# Patient Record
Sex: FEMALE | Race: WHITE | NOT HISPANIC OR LATINO | Employment: UNEMPLOYED | URBAN - NONMETROPOLITAN AREA
[De-identification: names, ages, dates, MRNs, and addresses within clinical notes are randomized per-mention and may not be internally consistent; named-entity substitution may affect disease eponyms.]

---

## 2017-07-11 ENCOUNTER — COMMUNICATION - GICH (OUTPATIENT)
Dept: FAMILY MEDICINE | Facility: OTHER | Age: 57
End: 2017-07-11

## 2017-07-11 DIAGNOSIS — R06.2 WHEEZING: ICD-10-CM

## 2017-08-19 ENCOUNTER — COMMUNICATION - GICH (OUTPATIENT)
Dept: FAMILY MEDICINE | Facility: OTHER | Age: 57
End: 2017-08-19

## 2017-08-19 DIAGNOSIS — I10 ESSENTIAL (PRIMARY) HYPERTENSION: ICD-10-CM

## 2017-09-08 ENCOUNTER — AMBULATORY - GICH (OUTPATIENT)
Dept: FAMILY MEDICINE | Facility: OTHER | Age: 57
End: 2017-09-08

## 2017-09-11 ENCOUNTER — HISTORY (OUTPATIENT)
Dept: FAMILY MEDICINE | Facility: OTHER | Age: 57
End: 2017-09-11

## 2017-09-11 ENCOUNTER — OFFICE VISIT - GICH (OUTPATIENT)
Dept: FAMILY MEDICINE | Facility: OTHER | Age: 57
End: 2017-09-11

## 2017-09-11 DIAGNOSIS — E89.40 ASYMPTOMATIC POSTPROCEDURAL OVARIAN FAILURE: ICD-10-CM

## 2017-09-11 DIAGNOSIS — Z72.0 TOBACCO USE: ICD-10-CM

## 2017-09-11 DIAGNOSIS — E55.9 VITAMIN D DEFICIENCY: ICD-10-CM

## 2017-09-11 DIAGNOSIS — R06.2 WHEEZING: ICD-10-CM

## 2017-09-11 DIAGNOSIS — I10 ESSENTIAL (PRIMARY) HYPERTENSION: ICD-10-CM

## 2017-09-11 DIAGNOSIS — F10.20 UNCOMPLICATED ALCOHOL DEPENDENCE (H): ICD-10-CM

## 2017-09-11 LAB
A/G RATIO - HISTORICAL: 1 (ref 1–2)
ALBUMIN SERPL-MCNC: 3.2 G/DL (ref 3.5–5.7)
ALP SERPL-CCNC: 127 IU/L (ref 34–104)
ALT (SGPT) - HISTORICAL: 52 IU/L (ref 7–52)
ANION GAP - HISTORICAL: 11 (ref 5–18)
AST SERPL-CCNC: 109 IU/L (ref 13–39)
BILIRUB SERPL-MCNC: 0.5 MG/DL (ref 0.3–1)
BUN SERPL-MCNC: 5 MG/DL (ref 7–25)
BUN/CREAT RATIO - HISTORICAL: 9
CALCIUM SERPL-MCNC: 8.8 MG/DL (ref 8.6–10.3)
CHLORIDE SERPLBLD-SCNC: 100 MMOL/L (ref 98–107)
CO2 SERPL-SCNC: 25 MMOL/L (ref 21–31)
CREAT SERPL-MCNC: 0.58 MG/DL (ref 0.7–1.3)
ERYTHROCYTE [DISTWIDTH] IN BLOOD BY AUTOMATED COUNT: 16.5 % (ref 11.5–15.5)
GFR IF NOT AFRICAN AMERICAN - HISTORICAL: >60 ML/MIN/1.73M2
GLOBULIN - HISTORICAL: 3.3 G/DL (ref 2–3.7)
GLUCOSE SERPL-MCNC: 56 MG/DL (ref 70–105)
HCT VFR BLD AUTO: 40.2 % (ref 33–51)
HEMOGLOBIN: 13.6 G/DL (ref 12–16)
INR - HISTORICAL: 0.9
MCH RBC QN AUTO: 30.1 PG (ref 26–34)
MCHC RBC AUTO-ENTMCNC: 33.8 G/DL (ref 32–36)
MCV RBC AUTO: 89 FL (ref 80–100)
PLATELET # BLD AUTO: 211 THOU/CU MM (ref 140–440)
PMV BLD: 10.6 FL (ref 6.5–11)
POTASSIUM SERPL-SCNC: 4 MMOL/L (ref 3.5–5.1)
PROT SERPL-MCNC: 6.5 G/DL (ref 6.4–8.9)
PROTIME - HISTORICAL: 11 SEC (ref 11.9–15.2)
RED BLOOD COUNT - HISTORICAL: 4.52 MIL/CU MM (ref 4–5.2)
SODIUM SERPL-SCNC: 136 MMOL/L (ref 133–143)
TSH - HISTORICAL: 1.98 UIU/ML (ref 0.34–5.6)
VITAMIN D TOTAL - HISTORICAL: 37.3 NG/ML
WHITE BLOOD COUNT - HISTORICAL: 4.3 THOU/CU MM (ref 4.5–11)

## 2017-09-11 ASSESSMENT — ANXIETY QUESTIONNAIRES
4. TROUBLE RELAXING: NOT AT ALL
2. NOT BEING ABLE TO STOP OR CONTROL WORRYING: MORE THAN HALF THE DAYS
GAD7 TOTAL SCORE: 8
5. BEING SO RESTLESS THAT IT IS HARD TO SIT STILL: NOT AT ALL
1. FEELING NERVOUS, ANXIOUS, OR ON EDGE: NEARLY EVERY DAY
6. BECOMING EASILY ANNOYED OR IRRITABLE: SEVERAL DAYS
7. FEELING AFRAID AS IF SOMETHING AWFUL MIGHT HAPPEN: SEVERAL DAYS
3. WORRYING TOO MUCH ABOUT DIFFERENT THINGS: SEVERAL DAYS

## 2017-09-12 ENCOUNTER — HOSPITAL ENCOUNTER (OUTPATIENT)
Dept: RADIOLOGY | Facility: OTHER | Age: 57
End: 2017-09-12
Attending: FAMILY MEDICINE

## 2017-09-12 DIAGNOSIS — Z72.0 TOBACCO USE: ICD-10-CM

## 2017-09-13 ENCOUNTER — COMMUNICATION - GICH (OUTPATIENT)
Dept: FAMILY MEDICINE | Facility: OTHER | Age: 57
End: 2017-09-13

## 2017-09-13 DIAGNOSIS — J47.0 BRONCHIECTASIS WITH ACUTE LOWER RESPIRATORY INFECTION (H): ICD-10-CM

## 2017-09-18 ENCOUNTER — AMBULATORY - GICH (OUTPATIENT)
Dept: SCHEDULING | Facility: OTHER | Age: 57
End: 2017-09-18

## 2017-09-18 ENCOUNTER — COMMUNICATION - GICH (OUTPATIENT)
Dept: FAMILY MEDICINE | Facility: OTHER | Age: 57
End: 2017-09-18

## 2017-09-19 ENCOUNTER — HOSPITAL ENCOUNTER (OUTPATIENT)
Dept: RADIOLOGY | Facility: OTHER | Age: 57
End: 2017-09-19
Attending: FAMILY MEDICINE

## 2017-09-19 DIAGNOSIS — E89.40 ASYMPTOMATIC POSTPROCEDURAL OVARIAN FAILURE: ICD-10-CM

## 2017-09-20 ENCOUNTER — HISTORY (OUTPATIENT)
Dept: FAMILY MEDICINE | Facility: OTHER | Age: 57
End: 2017-09-20

## 2017-09-20 ENCOUNTER — OFFICE VISIT - GICH (OUTPATIENT)
Dept: FAMILY MEDICINE | Facility: OTHER | Age: 57
End: 2017-09-20

## 2017-09-20 DIAGNOSIS — Z72.0 TOBACCO USE: ICD-10-CM

## 2017-09-20 DIAGNOSIS — J47.0 BRONCHIECTASIS WITH ACUTE LOWER RESPIRATORY INFECTION (H): ICD-10-CM

## 2017-09-20 DIAGNOSIS — M80.00XD AGE-RELATED OSTEOPOROSIS WITH CURRENT PATHOLOGICAL FRACTURE WITH ROUTINE HEALING: ICD-10-CM

## 2017-09-23 ENCOUNTER — HISTORY (OUTPATIENT)
Dept: FAMILY MEDICINE | Facility: OTHER | Age: 57
End: 2017-09-23

## 2017-09-25 ENCOUNTER — HOSPITAL ENCOUNTER (OUTPATIENT)
Dept: RESPIRATORY THERAPY | Facility: OTHER | Age: 57
End: 2017-09-25
Attending: FAMILY MEDICINE

## 2017-09-25 DIAGNOSIS — Z72.0 TOBACCO USE: ICD-10-CM

## 2017-09-25 DIAGNOSIS — R06.2 WHEEZING: ICD-10-CM

## 2017-10-04 ENCOUNTER — COMMUNICATION - GICH (OUTPATIENT)
Dept: FAMILY MEDICINE | Facility: OTHER | Age: 57
End: 2017-10-04

## 2017-10-05 ENCOUNTER — OFFICE VISIT - GICH (OUTPATIENT)
Dept: FAMILY MEDICINE | Facility: OTHER | Age: 57
End: 2017-10-05

## 2017-10-05 ENCOUNTER — HISTORY (OUTPATIENT)
Dept: FAMILY MEDICINE | Facility: OTHER | Age: 57
End: 2017-10-05

## 2017-10-05 DIAGNOSIS — R91.1 SOLITARY PULMONARY NODULE: ICD-10-CM

## 2017-10-05 DIAGNOSIS — J44.9 CHRONIC OBSTRUCTIVE PULMONARY DISEASE (H): ICD-10-CM

## 2017-10-05 DIAGNOSIS — F10.20 UNCOMPLICATED ALCOHOL DEPENDENCE (H): ICD-10-CM

## 2017-10-16 ENCOUNTER — COMMUNICATION - GICH (OUTPATIENT)
Dept: FAMILY MEDICINE | Facility: OTHER | Age: 57
End: 2017-10-16

## 2017-10-16 DIAGNOSIS — R93.89 ABNORMAL FINDINGS ON DIAGNOSTIC IMAGING OF OTHER SPECIFIED BODY STRUCTURES: ICD-10-CM

## 2017-10-18 ENCOUNTER — AMBULATORY - GICH (OUTPATIENT)
Dept: FAMILY MEDICINE | Facility: OTHER | Age: 57
End: 2017-10-18

## 2017-10-18 ENCOUNTER — HOSPITAL ENCOUNTER (OUTPATIENT)
Dept: RADIOLOGY | Facility: OTHER | Age: 57
End: 2017-10-18
Attending: FAMILY MEDICINE

## 2017-10-18 DIAGNOSIS — R93.89 ABNORMAL FINDINGS ON DIAGNOSTIC IMAGING OF OTHER SPECIFIED BODY STRUCTURES: ICD-10-CM

## 2017-10-18 DIAGNOSIS — J18.9 PNEUMONIA: ICD-10-CM

## 2017-10-19 ENCOUNTER — AMBULATORY - GICH (OUTPATIENT)
Dept: SCHEDULING | Facility: OTHER | Age: 57
End: 2017-10-19

## 2017-10-19 ENCOUNTER — COMMUNICATION - GICH (OUTPATIENT)
Dept: FAMILY MEDICINE | Facility: OTHER | Age: 57
End: 2017-10-19

## 2017-10-19 DIAGNOSIS — J44.9 CHRONIC OBSTRUCTIVE PULMONARY DISEASE (H): ICD-10-CM

## 2017-10-19 DIAGNOSIS — R91.1 SOLITARY PULMONARY NODULE: ICD-10-CM

## 2017-11-16 ENCOUNTER — AMBULATORY - GICH (OUTPATIENT)
Dept: SCHEDULING | Facility: OTHER | Age: 57
End: 2017-11-16

## 2017-11-27 ENCOUNTER — OFFICE VISIT - GICH (OUTPATIENT)
Dept: FAMILY MEDICINE | Facility: OTHER | Age: 57
End: 2017-11-27

## 2017-11-27 ENCOUNTER — HOSPITAL ENCOUNTER (OUTPATIENT)
Dept: RADIOLOGY | Facility: OTHER | Age: 57
End: 2017-11-27
Attending: FAMILY MEDICINE

## 2017-11-27 ENCOUNTER — HISTORY (OUTPATIENT)
Dept: FAMILY MEDICINE | Facility: OTHER | Age: 57
End: 2017-11-27

## 2017-11-27 DIAGNOSIS — M25.552 PAIN IN LEFT HIP: ICD-10-CM

## 2017-11-27 DIAGNOSIS — M25.562 PAIN IN LEFT KNEE: ICD-10-CM

## 2017-12-04 ENCOUNTER — AMBULATORY - GICH (OUTPATIENT)
Dept: FAMILY MEDICINE | Facility: OTHER | Age: 57
End: 2017-12-04

## 2017-12-04 DIAGNOSIS — A31.9 MYCOBACTERIAL INFECTION: ICD-10-CM

## 2017-12-11 ENCOUNTER — OFFICE VISIT - GICH (OUTPATIENT)
Dept: FAMILY MEDICINE | Facility: OTHER | Age: 57
End: 2017-12-11

## 2017-12-11 ENCOUNTER — HISTORY (OUTPATIENT)
Dept: FAMILY MEDICINE | Facility: OTHER | Age: 57
End: 2017-12-11

## 2017-12-11 DIAGNOSIS — M25.552 PAIN IN LEFT HIP: ICD-10-CM

## 2017-12-11 DIAGNOSIS — M25.562 PAIN IN LEFT KNEE: ICD-10-CM

## 2017-12-19 ENCOUNTER — AMBULATORY - GICH (OUTPATIENT)
Dept: SCHEDULING | Facility: OTHER | Age: 57
End: 2017-12-19

## 2017-12-19 ENCOUNTER — AMBULATORY - GICH (OUTPATIENT)
Dept: FAMILY MEDICINE | Facility: OTHER | Age: 57
End: 2017-12-19

## 2017-12-27 NOTE — PROGRESS NOTES
Patient Information     Patient Name MRN Lisy Lopez [id number] Female 1960      Progress Notes by Cas Sanches MD at 10/5/2017 11:00 AM     Author:  Cas Sanches MD Service:  (none) Author Type:  Physician     Filed:  10/5/2017  4:26 PM Encounter Date:  10/5/2017 Status:  Signed     :  Cas Sanches MD (Physician)            SUBJECTIVE:    Lisy Telles is a 57 y.o. female who presents for follow up COPD and ashtma    HPI    Leaves for EtOH treatment tomorrow.  Did detox last week.  Sober now for 1 week.  Did out patient before, but now is going to do inpatient.    Breathing is improving.  Is about 70% better overall from when she first was diagnosed with pneumonia.  Had been very tired, no longer.  She had PFTs done .  Showed moderate obstruction.  She coughs daily, spits up clear sputum, sticky.  No fevers.  No blood in sputum.  Has dyspnea on exertion, even with talking at times.  Is planning on stopping smoking in her treatment program.    I reviewed her last CT scan.  Is to have close follow up given nodule and pulmonary consolidation.    Allergies     Allergen  Reactions     Penicillins Anaphylaxis   ,   Current Outpatient Prescriptions on File Prior to Visit       Medication  Sig Dispense Refill     albuterol HFA (PROAIR HFA) 90 mcg/actuation inhaler Inhale 2 Puffs by mouth 4 times daily if needed. 1 Inhaler 2     alendronate (FOSAMAX) 70 mg tablet Take 1 tablet by mouth once a week in the morning. Take on empty stomach with full glass of water. Do not lie down for 1 hr. 13 tablet 4     hydrOXYzine HCl (ATARAX) 10 mg tablet Take 1-2 tablets by mouth 3 times daily if needed.       lisinopril (PRINIVIL; ZESTRIL) 5 mg tablet Take 1 tablet by mouth once daily. 90 tablet 3     naltrexone (REVIA) 50 mg tablet Take 1 tablet by mouth once daily.       traZODone (DESYREL) 50 mg tablet Take 1 tablet by mouth at bedtime.       venlafaxine (EFFEXOR XR) 150 mg  Extended-Release capsule Take 1 capsule by mouth once daily with evening meal. 90 capsule 3     venlafaxine (EFFEXOR XR) 75 mg cp24 Extended-Release capsule Take 1 capsule by mouth once daily with a meal. To take with 150 gd for 225 mg daily total 90 capsule 3     No current facility-administered medications on file prior to visit.    ,   Past Medical History:     Diagnosis  Date     Age-related osteoporosis with current pathological fracture with routine healing 2017     Alcohol dependence (HC)      Appendicitis      Axillary nerve palsy 2016     Closed 4-part fracture of proximal end of left humerus 2016     Depression     Depression with insomnia and tiredness      Fibroma     Right Breast      Fracture of ankle     Fracture, right ankle       Hx of pregnancy      4, Para 4-0-0-4       Hypercholesterolemia      Hyperplastic rectal polyp     Hyperplastic polyp times three noted on colonoscopy 2007      Hypertension      Tobacco abuse     and   Past Surgical History:      Procedure  Laterality Date     ANKLE FRACTURE TREATMENT  07    Open reduction/internal fixation, right ankle fracture 07.        BREAST LUMPECTOMY      Lumpectomy of the right breast        COLONOSCOPY      screening for family history,  follow-up recommended in five years.        COLONOSCOPY      Colonoscopy 2007.  Follow-up recommended in five years.        COLONOSCOPY  2013    Normal, hemorrhoids - follow up 5 years for family history of colon cnacer       GASTRIC BYPASS       HYSTERECTOMY  2007    Laparoscopic supracervical hysterectomy and bilateral salpingo-oophorectomy for bleeding fibroid       Skin Grafting      Skin grafting post right ankle fracture        TUBAL LIGATION         REVIEW OF SYSTEMS:  Review of Systems   Constitutional: Negative for chills and fever.   Respiratory: Positive for cough, sputum production and shortness of breath.    Cardiovascular:  "Negative for chest pain.   Neurological: Negative for headaches.   Psychiatric/Behavioral: Negative for substance abuse.       OBJECTIVE:  /72  Resp 16  Ht 1.626 m (5' 4\")  Wt 56.3 kg (124 lb 3.2 oz)  BMI 21.32 kg/m2    EXAM:   Physical Exam   Constitutional: She is oriented to person, place, and time and well-developed, well-nourished, and in no distress. No distress.   Cardiovascular: Normal rate, regular rhythm and normal heart sounds.  Exam reveals no gallop and no friction rub.    No murmur heard.  Pulmonary/Chest:   Decreased breath sounds diffusely, but clear to auscultation.   Neurological: She is alert and oriented to person, place, and time.   Skin: She is not diaphoretic.   Psychiatric: Memory, affect and judgment normal.        ASSESSMENT/PLAN:    ICD-10-CM    1. 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C) J44.9 tiotropium (SPIRIVA) 18 mcg inhalation capsule      fluticasone-salmeterol (ADVAIR DISKUS) 250-50 mcg/Dose diskus inhaler      nicotine 21 mg/24 hr (NICODERM; HABITROL) 21 mg/24 hr patch      nicotine 14 mg/24 hr (NICODERM; HABITROL) 14 mg/24 hr patch      nicotine 7 mg/24 hr (NICODERM; HABITROL) 7 mg/24 hr patch   2. Lung nodule < 6cm on CT R91.1    3. Alcoholism in recovery (HC) F10.20         Plan:  She appears to be improving from what is assumed to be pneumonia.  Reviewed the CT with her and I advise a repeat on it when she returns from her treatment.  Congratulated her on sobriety and filled nicoderm for her plan to stom smoking.  20/25 minutes spent in coordination of care and reviewing the results.    Cas Sanches MD ....................  10/5/2017   4:25 PM            "

## 2017-12-27 NOTE — PROGRESS NOTES
"Patient Information     Patient Name MRN Sex Lisy Cassidy [id number] Female 1960      Progress Notes by Silvino Peace MD at 2017 12:30 PM     Author:  Silvino Peace MD Service:  (none) Author Type:  Physician     Filed:  2017  7:26 AM Encounter Date:  2017 Status:  Signed     :  Silvino Peace MD (Physician)            SUBJECTIVE:      Lisy Telles is a 57 y.o. female who presents for follow up on hypertension, alcohol abuse, tobacco abuse.    She has a rough past year. Her son committed murder and was just sentenced to intermediate. Has resumed alcohol intake over the past year. She plans inpatient treatment for alcoholism.    BP is stable on lisinopril.    Working with psychiatry, Jack Hopkins, at [facility] on anxiety and alcoholism. Stopped Revia, but plans to resume. Has trazodone, but does not take when drinking alcohol.    She has a copy of x-ray report from chiropractor noting \"mild right lumbar convexity with right rotation, osteopetrosis, L5 spondylolisthesis grade I, severe L5-S1 discopathy, and compression fractures at L1, T11, and questionable at T8.\" - Dr Denver.  I looked at her previous abdominal MRI and no compression fractures noted. She fell off a horse since that time. Had ovaries removed around age 46. Never on estrogen.    Used albuterol PRN for wheezing. Continues to smoke 1 ppd.    PAST MEDICAL HISTORY:  Past Medical History:     Diagnosis  Date     Alcohol dependence (HC)      Appendicitis      Axillary nerve palsy 2016     Closed 4-part fracture of proximal end of left humerus 2016     Depression     Depression with insomnia and tiredness      Fibroma     Right Breast      Fracture of ankle     Fracture, right ankle       Hx of pregnancy      4, Para 4-0-0-4       Hypercholesterolemia      Hyperplastic rectal polyp     Hyperplastic polyp times three noted on colonoscopy 2007      Hypertension      Tobacco abuse  "     SURGICAL HISTORY:  Past Surgical History:      Procedure  Laterality Date     ANKLE FRACTURE TREATMENT  07    Open reduction/internal fixation, right ankle fracture 07.        BREAST LUMPECTOMY      Lumpectomy of the right breast        COLONOSCOPY      screening for family history,  follow-up recommended in five years.        COLONOSCOPY      Colonoscopy 2007.  Follow-up recommended in five years.        COLONOSCOPY  2013    Normal, hemorrhoids - follow up 5 years for family history of colon cnacer       GASTRIC BYPASS       HYSTERECTOMY  2007    Laparoscopic supracervical hysterectomy and bilateral salpingo-oophorectomy for bleeding fibroid       Skin Grafting      Skin grafting post right ankle fracture        TUBAL LIGATION         SOCIAL HISTORY:  Social History     Social History        Marital status:       Spouse name: N/A     Number of children:  N/A     Years of education:  N/A     Occupational History      Not on file.     Social History Main Topics         Smoking status:  Current Every Day Smoker      Packs/day: 1.00      Years: 43.00      Types: Cigarettes      Smokeless tobacco:  Never Used      Alcohol use  0.0 oz/week     0 Standard drinks or equivalent per week      Drug use:  No      Sexual activity:  Yes      Partners: Male      Birth control/ protection: Surgical      Other Topics  Concern     Not on file      Social History Narrative     . Drinks 6 beers daily.      FAMILY HISTORY:  Family History       Problem   Relation Age of Onset     Cancer-colon  Maternal Grandmother      Cancer-colon  Mother 40     surgical resection--remission       Hypertension  Mother      Heart Disease  Father 36     MI  - Quadruple bypass        Hypertension  Father      Thyroid Disease  Sister      Cancer-breast  Paternal Aunt 60            Other  Mother      Obesity       Cancer-colon  Maternal Grandmother      Other  Father      Obesity         CURRENT MEDICATIONS:   Current Outpatient Prescriptions       Medication  Sig Dispense Refill     albuterol HFA (PROAIR HFA) 90 mcg/actuation inhaler Inhale 2 Puffs by mouth 4 times daily if needed. 1 Inhaler 2     doxycycline (ADOXA) 100 mg tablet Take 1 tablet by mouth 2 times daily for 10 days. 20 tablet 0     hydrOXYzine HCl (ATARAX) 10 mg tablet Take 1-2 tablets by mouth 3 times daily if needed.       lisinopril (PRINIVIL; ZESTRIL) 5 mg tablet Take 1 tablet by mouth once daily. 90 tablet 3     naltrexone (REVIA) 50 mg tablet Take 1 tablet by mouth once daily.       traZODone (DESYREL) 50 mg tablet Take 1 tablet by mouth at bedtime.       venlafaxine (EFFEXOR XR) 150 mg Extended-Release capsule Take 1 capsule by mouth once daily with evening meal. 90 capsule 3     venlafaxine (EFFEXOR XR) 75 mg cp24 Extended-Release capsule Take 1 capsule by mouth once daily with a meal. To take with 150 gd for 225 mg daily total 90 capsule 3     No current facility-administered medications for this visit.      Medications have been reviewed by me and are current to the best of my knowledge and ability.    ALLERGIES:  Penicillins    REVIEW OF SYSTEMS:  Eyes: Denies problems  Ears/Nose/Throat: Denies problems  Cardiovascular: Denies problems  Respiratory: Denies problems  Gastrointestinal: Denies problems  Genitourinary: Denies problems  Musculoskeletal: back pain, seeing chiropractor  Skin: Denies problems  Neurologic: Denies problems  Psychiatric: alcoholism    PHQ Depression Screening 9/11/2017   Date of PHQ exam (doc flow) 9/11/2017   1. Lack of interest/pleasure 3 - Nearly every day   2. Feeling down/depressed 3 - Nearly every day   PHQ-2 TOTAL SCORE 6   3. Trouble sleeping 0 - Not at all   4. Decreased energy 3 - Nearly every day   5. Appetite change 3 - Nearly every day   6. Feelings of failure 3 - Nearly every day   7. Trouble concentrating 2 - More than half the days   8. Activity level 2 - More than half the days  "  9. Hurting yourself 1 - Several days   PHQ-9 TOTAL SCORE 20   PHQ-9 Severity Level severe   Functional Impairment very difficult   Some recent data might be hidden       OBJECTIVE:  /80  Pulse 84  Ht 1.622 m (5' 3.88\")  Wt 54.3 kg (119 lb 9.6 oz)  Breastfeeding? No  BMI 20.61 kg/m2  EXAM:   General Appearance: Pleasant, alert, appropriate appearance for age. No acute distress  Eye Exam:  Normal external eye, conjunctiva, lids, cornea. KIMBERLY.  Ear Exam: Normal TM's bilaterally.   OroPharynx Exam:  Dental hygiene adequate. Normal buccal mucose. Normal pharynx.  Neck Exam:  Supple, no masses or nodes.  Thyroid Exam: No nodules or enlargement.  Chest/Respiratory Exam: Normal chest wall and respirations. Clear to auscultation.  Cardiovascular Exam: Regular rate and rhythm. S1, S2, no murmur, click, gallop, or rubs.  Gastrointestinal Exam: Soft, non-tender, no masses or organomegaly.  Musculoskeletal Exam: Back is straight and non-tender, full ROM of upper and lower extremities.  Foot Exam: Left and right foot: good pedal pulses.  Skin: no rash or abnormalities  Neurologic Exam: Nonfocal, symmetric DTRs, normal gross motor, tone coordination and no tremor.  Psychiatric Exam: Alert and oriented - appropriate affect.    Results for orders placed or performed in visit on 09/11/17      COMP METABOLIC PANEL      Result  Value Ref Range    SODIUM 136 133 - 143 mmol/L    POTASSIUM 4.0 3.5 - 5.1 mmol/L    CHLORIDE 100 98 - 107 mmol/L    CO2,TOTAL 25 21 - 31 mmol/L    ANION GAP 11 5 - 18                    GLUCOSE 56 (L) 70 - 105 mg/dL    CALCIUM 8.8 8.6 - 10.3 mg/dL    BUN 5 (L) 7 - 25 mg/dL    CREATININE 0.58 (L) 0.70 - 1.30 mg/dL    BUN/CREAT RATIO           9                    GFR if African American >60 >60 ml/min/1.73m2    GFR if not African American >60 >60 ml/min/1.73m2    ALBUMIN 3.2 (L) 3.5 - 5.7 g/dL    PROTEIN,TOTAL 6.5 6.4 - 8.9 g/dL    GLOBULIN                  3.3 2.0 - 3.7 g/dL    A/G RATIO 1.0 1.0 - " 2.0                    BILIRUBIN,TOTAL 0.5 0.3 - 1.0 mg/dL    ALK PHOSPHATASE 127 (H) 34 - 104 IU/L    ALT (SGPT) 52 7 - 52 IU/L    AST (SGOT) 109 (H) 13 - 39 IU/L   CBC W PLT NO DIFF      Result  Value Ref Range    WHITE BLOOD COUNT         4.3 (L) 4.5 - 11.0 thou/cu mm    RED BLOOD COUNT           4.52 4.00 - 5.20 mil/cu mm    HEMOGLOBIN                13.6 12.0 - 16.0 g/dL    HEMATOCRIT                40.2 33.0 - 51.0 %    MCV                       89 80 - 100 fL    MCH                       30.1 26.0 - 34.0 pg    MCHC                      33.8 32.0 - 36.0 g/dL    RDW                       16.5 (H) 11.5 - 15.5 %    PLATELET COUNT            211 140 - 440 thou/cu mm    MPV                       10.6 6.5 - 11.0 fL   VITAMIN D 25 (DEFICIENCY)      Result  Value Ref Range    VITAMIN D TOTAL AFL 37.3   ng/mL   PROTIME-INR      Result  Value Ref Range    INR 0.9 <1.3    PROTIME 11.0 (L) 11.9 - 15.2 sec   TSH      Result  Value Ref Range    TSH 1.98 0.34 - 5.60 uIU/mL        ASSESSMENT/PLAN:    ICD-10-CM    1. Hypertension I10 lisinopril (PRINIVIL; ZESTRIL) 5 mg tablet   2. Postablative ovarian failure E89.40 XR DXA BONE DENSITY 2 SITES AXIAL   3. Alcoholism (HC) F10.20 COMP METABOLIC PANEL      CBC W PLT NO DIFF      VITAMIN D 25 (DEFICIENCY)      PROTIME-INR      TSH      COMP METABOLIC PANEL      CBC W PLT NO DIFF      VITAMIN D 25 (DEFICIENCY)      PROTIME-INR      TSH   4. Vitamin D insufficiency E55.9 VITAMIN D 25 (DEFICIENCY)      VITAMIN D 25 (DEFICIENCY)   5. Wheezing R06.2 albuterol HFA (PROAIR HFA) 90 mcg/actuation inhaler      COMPLETE PFT SPIROMETRY LUNG VOL DIFFUSION   6. Tobacco abuse Z72.0 COMPLETE PFT SPIROMETRY LUNG VOL DIFFUSION      CT CHEST WO      CANCELED: CT CHEST SCREENING LOW DOSE WO CONTRAST       BP WNL. Lab for monitoring WNL. Refilled lisinopril.     Given possible compression fractures with ovarian failure at age 46, obtained labs for possible early osteoporosis including TSH, vit D, and  calcium. DEXA scan ordered.     Ongoing tobacco use and wheezing. Recommend PFTs to assess for asthma or COPD. Refilled albuterol inhaler to use PRN. Has been using too frequently and so likely to need controller. She does plan to quit smoking.    With over 40 pack years of smoking, qualifies for a screening CT scan for lung cancer. Ordered.     Plans to quit smoking while in inpatient treatment, which I highly support.    F/U in a few months when complete with treatment    Greater than 50% of this 42 minute appointment spent on counseling.

## 2017-12-28 NOTE — TELEPHONE ENCOUNTER
Patient Information     Patient Name MRN Lisy Lopez [id number] Female 1960      Telephone Encounter by Erica Chavarria at 10/20/2017  9:22 AM     Author:  Erica Chavarria Service:  (none) Author Type:  (none)     Filed:  10/20/2017  9:23 AM Encounter Date:  10/19/2017 Status:  Signed     :  Erica Chavarria            Patient is aware .  Erica Chavarria LPN ....................10/20/2017  9:22 AM

## 2017-12-28 NOTE — PROGRESS NOTES
Patient Information     Patient Name MRN Lisy Lopez [id number] Female 1960      Progress Notes by Jose Guadalupe Guo MD at 2017  3:30 PM     Author:  Jose Guadalupe Guo MD Service:  (none) Author Type:  Physician     Filed:  2017  4:22 PM Encounter Date:  2017 Status:  Signed     :  Jose Guadalupe Guo MD (Physician)            SUBJECTIVE:  Lisy Telles is a 57 y.o. female here for follow-up. Patient injured herself while working at [employer] [facility] [employer] on . She reports that she was putting items in a cooler when she turned and tripped over a cart. She landed directly onto her left knee and rolled to her left side. She initially had left knee, left hip, left elbow and left shoulder pain. She is seen in emergency room where x-rays of her knee and hip were unremarkable. She states that her symptoms in her elbow, shoulder and knee had essentially resolved. She continues to have quite a bit of discomfort in her proximal and mid left anterior thigh. This is worsened with walking, standing. She's been using ibuprofen with minimal relief. No previous injuries to her left hip that she is aware of.    ROS:    As above otherwise ROS is unremarkable.    OBJECTIVE:  /64  Pulse 84  Wt 57.5 kg (126 lb 12.8 oz)  BMI 22.11 kg/m2    EXAM:  General Appearance: Pleasant, alert, appropriate appearance for age. No acute distress  Musculoskeletal: Left knee shows normal range of motion without any crepitus. She has no joint line tenderness to palpation. Normal varus, valgus, anterior drawer and Lachman's. Negative Xin's. No anterior swelling is noted.    Hip shows normal flexion, internal and external rotation however she has pain with both internal and external rotation. Her pain is in her proximal anterior thigh. She has no tenderness to palpation along her greater trochanter. She has mild tenderness to palpation over her anterior proximal and mid thigh. No  tenderness over her adductors. No SI joint tenderness.    ASSESSEMENT AND PLAN:    Lisy was seen today for [facility].    Diagnoses and all orders for this visit:    Left hip pain  -     XR HIP 2 OR 3 VIEWS W PELVIS LEFT; Future    Acute pain of left knee     outside records from [place] were reviewed with the patient's permission. This shows normal x-ray interpretations however I do not see the images. Based on her continuing left anterior thigh pain x-rays of her left hip and pelvis were repeated, personally reviewed and shows no significant bony abnormality. Suspect that this is likely soft tissue in nature. Would recommend continued anti-inflammatories, ice, stretching. If her prescription for hydrocodone was also given for pain relief. Potential side effects were discussed. We'll plan on having her follow up in 2 weeks. Workability was completed today.      Gonsalo Guo MD    This document was prepared using voice generated software.  While every attempt was made for accuracy, grammatical errors may exist.

## 2017-12-28 NOTE — TELEPHONE ENCOUNTER
Patient Information     Patient Name MRN Lisy Lopez [id number] Female 1960      Telephone Encounter by Shirin Phan at 2017  5:04 PM     Author:  Shirin Phan Service:  (none) Author Type:  (none)     Filed:  2017  5:06 PM Encounter Date:  2017 Status:  Signed     :  Shirin Phan            Patient states that she is not feeling any better and would like an appointment. Appointment arranged for Wednesday at 10:30.  Shirin Phan LPN 2017 5:04 PM

## 2017-12-28 NOTE — TELEPHONE ENCOUNTER
Patient Information     Patient Name MRN Lisy Lopez [id number] Female 1960      Telephone Encounter by Silvino Peace MD at 2017  4:13 PM     Author:  Silvino Peace MD Service:  (none) Author Type:  Physician     Filed:  2017  4:18 PM Encounter Date:  2017 Status:  Signed     :  Silvino Peace MD (Physician)            Please let her know the lung CT showed pneumonia because it looks like her airways do no clear phlegm from the lungs well enough. Treat with azithromycin and then we should actually repeat the CT scan in about a month to see if anything of concern remains. Nothing that looks bad right now, but the inflammation from pneumonia may cover up something of concern. I sent in doxycycline to take twice daily for 10 days

## 2017-12-28 NOTE — TELEPHONE ENCOUNTER
Patient Information     Patient Name MRN Lisy Lopez [id number] Female 1960      Telephone Encounter by Estefanía David at 10/5/2017  7:55 AM     Author:  Estefanía David Service:  (none) Author Type:  (none)     Filed:  10/5/2017  7:59 AM Encounter Date:  10/4/2017 Status:  Signed     :  Estefanía David            Notified pt of results below, pt states understanding and has no further questions at this time.    Estefanía Cortes LPN

## 2017-12-28 NOTE — TELEPHONE ENCOUNTER
Patient Information     Patient Name MRN Lisy Lopez [id number] Female 1960      Telephone Encounter by Estefanía David at 10/4/2017  4:29 PM     Author:  Estefanía David Service:  (none) Author Type:  (none)     Filed:  10/4/2017  4:34 PM Encounter Date:  10/4/2017 Status:  Signed     :  Estefanía David            Called pt in regards to message below. Pt states she is looking for results on a breathing test she had done. Clarified medication direction with in regards to Fosamax 70mg.  Estefanía Cortes LPN

## 2017-12-28 NOTE — TELEPHONE ENCOUNTER
Patient Information     Patient Name MRN Lisy Lopez [id number] Female 1960      Telephone Encounter by Balbina Phan RN at 2017  9:53 AM     Author:  Balbina Phan RN Service:  (none) Author Type:  NURS- Registered Nurse     Filed:  2017  9:57 AM Encounter Date:  2017 Status:  Signed     :  Balbina Phan RN (NURS- Registered Nurse)            Bronchodilator Inhalers     Office visit in the past 12 months.    Last visit with Silvino Peace MD  was on: 2016 in [facility]  Next visit with Silvino Peace MD  is on: No future appointment listed with this provider  Next visit with Family Practice is on: No future appointment listed in this department    Max refills 12 months from last office visit.  Prescription refilled per RN Medication Refill Policy.................... BALBINA PHAN RN ....................  2017   9:56 AM

## 2017-12-28 NOTE — PROGRESS NOTES
Patient Information     Patient Name MRLisy Woodard [id number] Female 1960      Progress Notes by Silvino Peace MD at 2017 10:30 AM     Author:  Silvino Peace MD Service:  (none) Author Type:  Physician     Filed:  2017 10:07 PM Encounter Date:  2017 Status:  Signed     :  Silvino Peace MD (Physician)            SUBJECTIVE:  57 y.o. female presents for follow up on abnormal CT scan showing pneumonia. Scan was for lung cancer screening. I did not know she was ill.  Reports a cough for 3 weeks. Worsened after I saw her on .   Started on doxycycline a week ago and is not better.  No fever, but feels hot and cold.  Some productive cough. Feels SOB for the past month.   Using albuterol 2-3 times daily.    Completed DEXA with T score of -3.3 and elevated FRAX scores. Never on any treatment previously.    Continues chronic alcohol use. Looking into treatment, but needs insurance to cover 1st    REVIEW OF SYSTEMS:    Constitutional: see above  Respiratory: see above  Cardiovascular: Negative  Gastrointestinal: Negative    Past Medical History:     Diagnosis  Date     Alcohol dependence (HC)      Appendicitis      Axillary nerve palsy 2016     Closed 4-part fracture of proximal end of left humerus 2016     Depression     Depression with insomnia and tiredness      Fibroma     Right Breast      Fracture of ankle     Fracture, right ankle       Hx of pregnancy      4, Para 4-0-0-4       Hypercholesterolemia      Hyperplastic rectal polyp     Hyperplastic polyp times three noted on colonoscopy 2007      Hypertension      Tobacco abuse           Current Outpatient Prescriptions       Medication  Sig Dispense Refill     albuterol HFA (PROAIR HFA) 90 mcg/actuation inhaler Inhale 2 Puffs by mouth 4 times daily if needed. 1 Inhaler 2     doxycycline (ADOXA) 100 mg tablet Take 1 tablet by mouth 2 times daily for 10 days. 20 tablet 0      hydrOXYzine HCl (ATARAX) 10 mg tablet Take 1-2 tablets by mouth 3 times daily if needed.       lisinopril (PRINIVIL; ZESTRIL) 5 mg tablet Take 1 tablet by mouth once daily. 90 tablet 3     naltrexone (REVIA) 50 mg tablet Take 1 tablet by mouth once daily.       traZODone (DESYREL) 50 mg tablet Take 1 tablet by mouth at bedtime.       venlafaxine (EFFEXOR XR) 150 mg Extended-Release capsule Take 1 capsule by mouth once daily with evening meal. 90 capsule 3     venlafaxine (EFFEXOR XR) 75 mg cp24 Extended-Release capsule Take 1 capsule by mouth once daily with a meal. To take with 150 gd for 225 mg daily total 90 capsule 3     Allergies as of 09/20/2017 - [place] as Reviewed 09/20/2017      Allergen  Reaction Noted     Penicillins Anaphylaxis 11/13/2012       OBJECTIVE:  /88  Pulse 88  Temp 98.4  F (36.9  C) (Tympanic)   Wt 55.8 kg (123 lb)  Breastfeeding? No  BMI 21.2 kg/m2    General Appearance: Pleasant, alert, appropriate appearance for age. No acute distress  Ear Exam: Normal TM's bilaterally. Normal auditory canals and external ears. Non-tender.  OroPharynx Exam: Dental hygiene adequate. Normal buccal mucosa. Normal pharynx.  Neck Exam: Supple, no masses or nodes.  Chest/Respiratory Exam: Normal chest wall and respirations. Clear to auscultation.  Cardiovascular Exam: Regular rate and rhythm. S1, S2, no murmur, click, gallop, or rubs.     ASSESSMENT/PLAN:    ICD-10-CM   1. Bronchiectasis with acute lower respiratory infection (HC) J47.0   2. Tobacco abuse Z72.0   3. Age-related osteoporosis with current pathological fracture with routine healing M80.00XD     Based on trajectory, she is improving now on doxycycline. If worse, then may need cefdinir or Levaquin. Based on tobacco use, suspect that she may benefit from prednisone for COPD. Given prednisone at low dose, 20 mg daily x 5 days to see if this helps.  Take ranitidine while on prednisone to protect stomach since she is still drinking  alcohol    With osteoporosis, start Fosamax when antibiotics and prednisone are complete. If not tolerating, then may have to wait for sobriety and retry.  F/U in 5 weeks

## 2017-12-28 NOTE — TELEPHONE ENCOUNTER
Patient Information     Patient Name MRLisy Woodard [id number] Female 1960      Telephone Encounter by Silvino Peace MD at 10/4/2017  8:04 PM     Author:  Silvino Peace MD Service:  (none) Author Type:  Physician     Filed:  10/4/2017  8:05 PM Encounter Date:  10/4/2017 Status:  Signed     :  Silvino Peace MD (Physician)            Testing shows moderate COPD and asthma. I cannot send results to [facility]. She may benefit from asthma and COPD inhalers, but depends on symptoms and if she improved with the antibiotics and prednisone. Can wait to discuss if she is going into treatment.

## 2017-12-28 NOTE — PROGRESS NOTES
Patient Information     Patient Name MRN Lisy Lopez [id number] Female 1960      Progress Notes by Shirin Bear at 2017  5:10 PM     Author:  Shriin Bear Service:  (none) Author Type:  Other Clinical Staff     Filed:  2017  5:10 PM Date of Service:  2017  5:10 PM Status:  Signed     :  Shirin Bear (Other Clinical Staff)            Falls Risk Criteria:    Age 65 and older or under age 4        Sensory deficits    Poor vision    Use of ambulatory aides    Impaired judgment    Unable to walk independently    Meets High Risk criteria for falls:  no

## 2017-12-28 NOTE — TELEPHONE ENCOUNTER
Patient Information     Patient Name MRLisy Woodard [id number] Female 1960      Telephone Encounter by Kenny Chamorro RN at 2017 11:20 AM     Author:  Kenny Chamorro RN Service:  (none) Author Type:  NURS- Registered Nurse     Filed:  2017 11:27 AM Encounter Date:  2017 Status:  Signed     :  Kenny Chamorro RN (NURS- Registered Nurse)            Redundant Refill Request for Lisinopril refused;    Prescribing Provider: Silvino Peace MD                Order Date: 2016  Ordered by: SILVINO PEACE  Medication:lisinopril (PRINIVIL; ZESTRIL) 5 mg tablet    Qty:90 tablet   Ref:3  Start:2016   End:              Route:Oral                  SARA:No   Class:eRx    Sig:Take 1 tablet by mouth once daily.    Pharmacy:[facility] [address] [place] [address] AT [address]              OF [facility] [address] [facility] - [phone]    Rx as requested is not due for refill for almost a month as noted above. Last office visit with PCP to discuss diagnosis of hypertension, as well as use of lisinopril was on 16. Office visit notes on that date indicate that patient was due for a physical at that time, as well as would be due for a follow up in relation to her Hypertension in on years time. Patient is coming due for office visit in 2017. No office visit scheduled. Will send patient w reminder letter/MyChart message.    Unable to complete prescription refill per RN Medication Refill Policy.................... Kenny Chamorro RN ....................  2017   11:25 AM

## 2017-12-28 NOTE — TELEPHONE ENCOUNTER
Patient Information     Patient Name MRN Lisy Lopez [id number] Female 1960      Telephone Encounter by Silvino Peace MD at 10/16/2017 11:46 AM     Author:  Silvino Peace MD Service:  (none) Author Type:  Physician     Filed:  10/16/2017 11:46 AM Encounter Date:  10/16/2017 Status:  Signed     :  Silvino Peace MD (Physician)            Ordered. Should follow up after scan to discuss results and follow up on breathing test

## 2017-12-28 NOTE — TELEPHONE ENCOUNTER
Patient Information     Patient Name MRN Sex Lisy Cassidy [id number] Female 1960      Telephone Encounter by Tony Varma LPN at 10/16/2017 11:35 AM     Author:  Tony Varma LPN Service:  (none) Author Type:  NURS- Licensed Practical Nurse     Filed:  10/16/2017 11:36 AM Encounter Date:  10/16/2017 Status:  Signed     :  Tony Varma LPN (NURS- Licensed Practical Nurse)            Patient would like CT referral placed for [facility]. Please place.  Tony Varma LPN ..............10/16/2017 11:35 AM

## 2017-12-28 NOTE — TELEPHONE ENCOUNTER
Patient Information     Patient Name MRN Lisy Lopez [id number] Female 1960      Telephone Encounter by Erica Chavarria at 10/19/2017  4:50 PM     Author:  Erica Chavarria Service:  (none) Author Type:  (none)     Filed:  10/19/2017  4:51 PM Encounter Date:  10/19/2017 Status:  Signed     :  Erica Chavarria            Patient needs referral to see pulmonary  specialist in [place] as is unable to get into [place] .

## 2017-12-28 NOTE — TELEPHONE ENCOUNTER
Patient Information     Patient Name MRLisy Woodard [id number] Female 1960      Telephone Encounter by Eliza Thomas at 10/16/2017 11:27 AM     Author:  Eliza Thomas Service:  (none) Author Type:  (none)     Filed:  10/16/2017 11:28 AM Encounter Date:  10/16/2017 Status:  Signed     :  Eliza Thomas            Left message to call back  ..................Eliza Thomas LPN......10/16/2017 11:28 AM

## 2017-12-28 NOTE — PATIENT INSTRUCTIONS
Patient Information     Patient Name MRN Lisy Lopez [id number] Female 1960      Patient Instructions by Silvino Peace MD at 2017 12:30 PM     Author:  Silvino Peace MD  Service:  (none) Author Type:  Physician     Filed:  2017  1:21 PM  Encounter Date:  2017 Status:  Addendum     :  Silvino Peace MD (Physician)        Related Notes: Original Note by Silvino Peace MD (Physician) filed at 2017  1:19 PM            Ordered bone density test  Ordered a chest CT as a lung cancer screen  Ordered a breathing test  Follow up in a few months

## 2017-12-28 NOTE — TELEPHONE ENCOUNTER
Patient Information     Patient Name MRN Lisy Lopez [id number] Female 1960      Telephone Encounter by Shriin Phan at 2017  4:47 PM     Author:  Shirin Phan Service:  (none) Author Type:  (none)     Filed:  2017  4:48 PM Encounter Date:  2017 Status:  Signed     :  Shirin Phan            Patient notified after name and date of birth were verified.  Shirin Phan LPN 2017 4:47 PM

## 2017-12-29 NOTE — PATIENT INSTRUCTIONS
Patient Information     Patient Name MRN Lisy Lopez [id number] Female 1960      Patient Instructions by Silvino Peace MD at 2017 10:30 AM     Author:  Silvino Peace MD Service:  (none) Author Type:  Physician     Filed:  2017 11:07 AM Encounter Date:  2017 Status:  Signed     :  Silvino Peace MD (Physician)            Continue doxycycline   Prednisone 20 mg daily for 5 days  Take ranitidine 150 mg twice daily while on the prednisone to protect the stomach    Start Fosamax when better and done with antibiotics and prednisone. Likely start in a couple weeks    Follow up end of October

## 2017-12-30 NOTE — NURSING NOTE
Patient Information     Patient Name Lisy Mijares [id number] Female 1960      Nursing Note by Kaylee Penny at 2017  3:30 PM     Author:  Kaylee Penny Service:  (none) Author Type:  (none)     Filed:  2017  3:47 PM Encounter Date:  2017 Status:  Signed     :  Kaylee Penny            Patient presents today for an ED follow up and work comp visit. Patient states she was putting milk away in the cooler, went to turn and fell onto the cement. She was seen in [place] on 17 where she states she had xrays and was told she had no fractures. She states her left knee is feeling better, but is having pain in her upper left thigh.  Kaylee Penny LPN .............2017  3:41 PM

## 2017-12-30 NOTE — NURSING NOTE
Patient Information     Patient Name Lisy Mijares [id number] Female 1960      Nursing Note by Shirin Phan at 2017 10:30 AM     Author:  Shirin Phan Service:  (none) Author Type:  (none)     Filed:  2017 10:52 AM Encounter Date:  2017 Status:  Signed     :  Shirin Phanduane Telles is a 57 y.o. female presenting for follow up. She states that she is still not feeling well. She doesn't know if it is the pneumonia or her depression. Patient aware that she is due for a pap and mammogram.  Shirin Phan LPN 2017 10:42 AM

## 2017-12-30 NOTE — NURSING NOTE
Patient Information     Patient Name MRN Lisy Lopez [id number] Female 1960      Nursing Note by Simi Simon at 10/5/2017 11:00 AM     Author:  Simi Simon Service:  (none) Author Type:  (none)     Filed:  10/5/2017 11:31 AM Encounter Date:  10/5/2017 Status:  Signed     :  Simi Simon            Pt had pneumonia, given antibiotics done with them, had a breathing test her COPD and asthma, is needing medicaiton  Simi Simon ....................  10/5/2017   11:11 AM

## 2017-12-30 NOTE — NURSING NOTE
Patient Information     Patient Name MRN Lisy Lopez [id number] Female 1960      Nursing Note by Shirin Phan at 2017 12:30 PM     Author:  Shirin Phan Service:  (none) Author Type:  (none)     Filed:  2017 12:51 PM Encounter Date:  2017 Status:  Signed     :  Shirin Phanduane Telles is a 57 y.o. female  presenting today for yearly medication management.   Shirin Phan LPN 2017 12:38 PM

## 2018-01-02 ENCOUNTER — AMBULATORY - GICH (OUTPATIENT)
Dept: SCHEDULING | Facility: OTHER | Age: 58
End: 2018-01-02

## 2018-01-03 ENCOUNTER — COMMUNICATION - GICH (OUTPATIENT)
Dept: SURGERY | Facility: OTHER | Age: 58
End: 2018-01-03

## 2018-01-24 ENCOUNTER — DOCUMENTATION ONLY (OUTPATIENT)
Dept: FAMILY MEDICINE | Facility: OTHER | Age: 58
End: 2018-01-24

## 2018-01-24 PROBLEM — F41.9 ANXIETY: Status: ACTIVE | Noted: 2018-01-24

## 2018-01-24 PROBLEM — Z72.0 TOBACCO ABUSE: Status: ACTIVE | Noted: 2017-09-11

## 2018-01-24 PROBLEM — F10.21 ALCOHOLISM IN RECOVERY (H): Status: ACTIVE | Noted: 2017-10-05

## 2018-01-24 PROBLEM — J43.9 COPD WITH CHRONIC BRONCHITIS AND EMPHYSEMA (H): Status: ACTIVE | Noted: 2017-10-05

## 2018-01-24 PROBLEM — J44.89 COPD WITH CHRONIC BRONCHITIS AND EMPHYSEMA (H): Status: ACTIVE | Noted: 2017-10-05

## 2018-01-24 PROBLEM — J47.0 BRONCHIECTASIS WITH ACUTE LOWER RESPIRATORY INFECTION (H): Status: ACTIVE | Noted: 2017-09-13

## 2018-01-24 PROBLEM — J44.9 COPD (CHRONIC OBSTRUCTIVE PULMONARY DISEASE) (H): Status: ACTIVE | Noted: 2018-01-24

## 2018-01-24 PROBLEM — M80.00XD AGE-RELATED OSTEOPOROSIS WITH CURRENT PATHOLOGICAL FRACTURE WITH ROUTINE HEALING: Status: ACTIVE | Noted: 2017-09-20

## 2018-01-24 RX ORDER — TRAZODONE HYDROCHLORIDE 50 MG/1
1-3 TABLET, FILM COATED ORAL AT BEDTIME
COMMUNITY
Start: 2017-11-13 | End: 2019-01-16

## 2018-01-24 RX ORDER — ALENDRONATE SODIUM 70 MG/1
TABLET ORAL
COMMUNITY
Start: 2017-09-23 | End: 2020-01-16

## 2018-01-24 RX ORDER — SODIUM CHLORIDE FOR INHALATION 3 %
VIAL, NEBULIZER (ML) INHALATION
COMMUNITY
Start: 2017-11-21 | End: 2022-01-25

## 2018-01-24 RX ORDER — HYDROCODONE BITARTRATE AND ACETAMINOPHEN 5; 325 MG/1; MG/1
1 TABLET ORAL EVERY 6 HOURS PRN
COMMUNITY
Start: 2017-11-27 | End: 2018-11-20

## 2018-01-24 RX ORDER — FLUTICASONE PROPIONATE 50 MCG
1-2 SPRAY, SUSPENSION (ML) NASAL DAILY
COMMUNITY
Start: 2017-10-09 | End: 2020-01-16

## 2018-01-24 RX ORDER — HYDROXYZINE HYDROCHLORIDE 10 MG/1
1-2 TABLET, FILM COATED ORAL 3 TIMES DAILY PRN
COMMUNITY
Start: 2017-08-30 | End: 2020-01-30

## 2018-01-24 RX ORDER — VENLAFAXINE HYDROCHLORIDE 150 MG/1
300 CAPSULE, EXTENDED RELEASE ORAL
COMMUNITY
Start: 2017-10-15 | End: 2019-01-16

## 2018-01-24 RX ORDER — LISINOPRIL 5 MG/1
5 TABLET ORAL DAILY
COMMUNITY
Start: 2017-09-11 | End: 2018-09-28

## 2018-01-24 RX ORDER — ALBUTEROL SULFATE 90 UG/1
2 AEROSOL, METERED RESPIRATORY (INHALATION) 4 TIMES DAILY PRN
COMMUNITY
Start: 2017-09-11 | End: 2018-09-13

## 2018-01-24 RX ORDER — ALBUTEROL SULFATE 0.83 MG/ML
1 SOLUTION RESPIRATORY (INHALATION) 4 TIMES DAILY
COMMUNITY
Start: 2017-11-21 | End: 2018-11-20

## 2018-01-27 VITALS
BODY MASS INDEX: 20.47 KG/M2 | TEMPERATURE: 98.4 F | DIASTOLIC BLOOD PRESSURE: 88 MMHG | HEART RATE: 88 BPM | SYSTOLIC BLOOD PRESSURE: 136 MMHG | WEIGHT: 123 LBS

## 2018-01-27 VITALS
HEART RATE: 84 BPM | BODY MASS INDEX: 20.42 KG/M2 | HEART RATE: 84 BPM | DIASTOLIC BLOOD PRESSURE: 72 MMHG | WEIGHT: 124.2 LBS | SYSTOLIC BLOOD PRESSURE: 112 MMHG | WEIGHT: 119.6 LBS | WEIGHT: 126.8 LBS | DIASTOLIC BLOOD PRESSURE: 80 MMHG | BODY MASS INDEX: 21.21 KG/M2 | RESPIRATION RATE: 16 BRPM | BODY MASS INDEX: 21.1 KG/M2 | DIASTOLIC BLOOD PRESSURE: 64 MMHG | SYSTOLIC BLOOD PRESSURE: 118 MMHG | HEIGHT: 64 IN | SYSTOLIC BLOOD PRESSURE: 116 MMHG | HEIGHT: 64 IN

## 2018-01-29 ENCOUNTER — AMBULATORY - GICH (OUTPATIENT)
Dept: LAB | Facility: OTHER | Age: 58
End: 2018-01-29

## 2018-01-29 ENCOUNTER — MEDICAL CORRESPONDENCE (OUTPATIENT)
Dept: HEALTH INFORMATION MANAGEMENT | Facility: OTHER | Age: 58
End: 2018-01-29

## 2018-01-29 DIAGNOSIS — A31.0 PULMONARY MYCOBACTERIAL INFECTION (H): ICD-10-CM

## 2018-01-29 DIAGNOSIS — J47.9 BRONCHIECTASIS WITHOUT COMPLICATION (H): ICD-10-CM

## 2018-01-29 DIAGNOSIS — J44.9 CHRONIC OBSTRUCTIVE PULMONARY DISEASE (H): ICD-10-CM

## 2018-01-29 LAB
ABSOLUTE BASOPHILS - HISTORICAL: 0.1 THOU/CU MM
ABSOLUTE EOSINOPHILS - HISTORICAL: 0.4 THOU/CU MM
ABSOLUTE IMMATURE GRANULOCYTES(METAS,MYELOS,PROS) - HISTORICAL: 0 THOU/CU MM
ABSOLUTE LYMPHOCYTES - HISTORICAL: 1.4 THOU/CU MM (ref 0.9–2.9)
ABSOLUTE MONOCYTES - HISTORICAL: 0.5 THOU/CU MM
ABSOLUTE NEUTROPHILS - HISTORICAL: 2.6 THOU/CU MM (ref 1.7–7)
ALT (SGPT) - HISTORICAL: 11 IU/L (ref 7–52)
AST SERPL-CCNC: 20 IU/L (ref 13–39)
BASOPHILS # BLD AUTO: 1 %
CREAT SERPL-MCNC: 0.76 MG/DL (ref 0.7–1.3)
EOSINOPHIL NFR BLD AUTO: 8.4 %
ERYTHROCYTE [DISTWIDTH] IN BLOOD BY AUTOMATED COUNT: 13.9 % (ref 11.5–15.5)
GFR IF NOT AFRICAN AMERICAN - HISTORICAL: >60 ML/MIN/1.73M2
HCT VFR BLD AUTO: 34.1 % (ref 33–51)
HEMOGLOBIN: 11.1 G/DL (ref 12–16)
IMMATURE GRANULOCYTES(METAS,MYELOS,PROS) - HISTORICAL: 0.2 %
LYMPHOCYTES NFR BLD AUTO: 27.8 % (ref 20–44)
MCH RBC QN AUTO: 29.3 PG (ref 26–34)
MCHC RBC AUTO-ENTMCNC: 32.6 G/DL (ref 32–36)
MCV RBC AUTO: 90 FL (ref 80–100)
MONOCYTES NFR BLD AUTO: 9.5 %
NEUTROPHILS NFR BLD AUTO: 53.1 % (ref 42–72)
PLATELET # BLD AUTO: 266 THOU/CU MM (ref 140–440)
PMV BLD: 9.8 FL (ref 6.5–11)
RED BLOOD COUNT - HISTORICAL: 3.79 MIL/CU MM (ref 4–5.2)
WHITE BLOOD COUNT - HISTORICAL: 4.9 THOU/CU MM (ref 4.5–11)

## 2018-01-30 ENCOUNTER — AMBULATORY - GICH (OUTPATIENT)
Dept: FAMILY MEDICINE | Facility: OTHER | Age: 58
End: 2018-01-30

## 2018-01-30 DIAGNOSIS — A31.0 PULMONARY MYCOBACTERIAL INFECTION (H): ICD-10-CM

## 2018-02-04 ASSESSMENT — PATIENT HEALTH QUESTIONNAIRE - PHQ9: SUM OF ALL RESPONSES TO PHQ QUESTIONS 1-9: 20

## 2018-02-04 ASSESSMENT — ANXIETY QUESTIONNAIRES: GAD7 TOTAL SCORE: 8

## 2018-02-08 ENCOUNTER — AMBULATORY - GICH (OUTPATIENT)
Dept: SCHEDULING | Facility: OTHER | Age: 58
End: 2018-02-08

## 2018-02-09 VITALS
HEART RATE: 78 BPM | SYSTOLIC BLOOD PRESSURE: 110 MMHG | WEIGHT: 125 LBS | BODY MASS INDEX: 21.46 KG/M2 | DIASTOLIC BLOOD PRESSURE: 70 MMHG

## 2018-02-12 NOTE — TELEPHONE ENCOUNTER
Patient Information     Patient Name MRN Lisy Lopez [id number] Female 1960      Telephone Encounter by Shirin Phan at 2017 11:01 AM     Author:  Shirin Phan Service:  (none) Author Type:  (none)     Filed:  2017 11:05 AM Encounter Date:  2017 Status:  Signed     :  Shirin Phan            EKG was sent down to the [facility] per ordering provider Betty Tavares.   Shirin Phan LPN 2017 11:04 AM

## 2018-02-12 NOTE — NURSING NOTE
"Patient Information     Patient Name Lisy Mijares [id number] Female 1960      Nursing Note by Kaylee Penny at 2017  1:45 PM     Author:  Kaylee Penny Service:  (none) Author Type:  (none)     Filed:  2017  2:00 PM Encounter Date:  2017 Status:  Signed     :  Kaylee Penny            Patient presents today to follow up on her left knee injury. She states that she has since improved and is \"ready to get back to it\".  Kaylee Penny LPN .............2017  1:46 PM          "

## 2018-02-12 NOTE — NURSING NOTE
Patient Information     Patient Name MRN Lisy Lopez [id number] Female 1960      Nursing Note by Mariluz Stratton RN at 2017  3:00 PM     Author:  Mariluz Stratton RN Service:  (none) Author Type:  NURS- Registered Nurse     Filed:  2017  3:53 PM Encounter Date:  2017 Status:  Signed     :  Mariluz Stratton RN (NURS- Registered Nurse)            Pt arrived for EKG late today due to weather. Pt denies chest pains she does have SOB due to lung infection she is being treated for  . Papers to [facility] to be faxed to [facility] per ordering provider Betty Schuler at fax number [phone]. MARILUZ STRATTON RN ....................  2017   3:51 PM

## 2018-02-12 NOTE — PROGRESS NOTES
Patient Information     Patient Name MRN Lisy Lopez [id number] Female 1960      Progress Notes by Jose Guadalupe Guo MD at 2017  1:45 PM     Author:  Jose Guadalupe Guo MD Service:  (none) Author Type:  Physician     Filed:  2017  2:07 PM Encounter Date:  2017 Status:  Signed     :  Jose Guadalupe Guo MD (Physician)            SUBJECTIVE:  Lisy Telles is a 57 y.o. female here for follow-up. Patient injured herself while working at [employer] [employer] on . She reports that she was putting items in a cooler when she turned and tripped over a cart. She landed directly onto her left knee and rolled to her left side. She initially had left knee, left hip, left elbow and left shoulder pain. She was seen in the emergency room where x-rays of her knee and hip were unremarkable.     At her last visit she is having increased pain in her left hip where x-rays were unremarkable. She was placed on work restrictions and was unable to work based on her restrictions. She comes in today stating that her pain has essentially resolved. The only discomfort she has it on her left knee when she tries to kneel on it and otherwise she feels back to her baseline.    ROS:    As above otherwise ROS is unremarkable.    OBJECTIVE:  /70  Pulse 78  Wt 56.7 kg (125 lb)  BMI 21.8 kg/m2    EXAM:  General Appearance: Pleasant, alert, appropriate appearance for age. No acute distress  Musculoskeletal: Left knee shows normal range of motion without any crepitus. She has no joint line tenderness to palpation. Normal varus, valgus, anterior drawer and Lachman's. Negative Xin's. No anterior swelling is noted.    Left hip shows normal flexion, internal and external rotation. She has no trochanteric tenderness. No SI joint tenderness. No pain with log rolling. No anterior pain over her joint.    ASSESSEMENT AND PLAN:    Lisy was seen today for follow up.    Diagnoses and all  orders for this visit:    Left hip pain    Acute pain of left knee     her exam has normalized and she is currently symptomatically. We will allow her to return without any work resections. She will follow-up as needed.      Gonsalo Guo MD    This document was prepared using voice generated software.  While every attempt was made for accuracy, grammatical errors may exist.

## 2018-02-13 NOTE — NURSING NOTE
Patient Information     Patient Name MRN Lisy Lopez [id number] Female 1960      Nursing Note by Mariluz Stratton RN at 2018  3:00 PM     Author:  Mariluz Stratton RN Service:  (none) Author Type:  NURS- Registered Nurse     Filed:  2018  3:02 PM Encounter Date:  2018 Status:  Signed     :  Mariluz Stratton RN (NURS- Registered Nurse)            Patient has a paper order here for EKG completion from [facility] for mycobacteria Avium Infection, with results to be faxed to the MD listed at [phone]. Phone is [phone]. EKG completed and sent to [facility] to be faxed as requested to above number. Pt reports no concerns at this time . MARILUZ STRATTON RN ....................  2018   3:01 PM

## 2018-03-13 DIAGNOSIS — A31.0 PULMONARY DISEASE DUE TO MYCOBACTERIA (H): ICD-10-CM

## 2018-03-13 DIAGNOSIS — J44.89 OBSTRUCTIVE CHRONIC BRONCHITIS WITHOUT EXACERBATION (H): Primary | ICD-10-CM

## 2018-03-13 DIAGNOSIS — J47.9 BRONCHIECTASIS (H): ICD-10-CM

## 2018-03-13 LAB
ALT SERPL W P-5'-P-CCNC: 13 U/L (ref 7–52)
AST SERPL W P-5'-P-CCNC: 20 U/L (ref 13–39)
CREAT SERPL-MCNC: 0.7 MG/DL (ref 0.6–1.2)
ERYTHROCYTE [DISTWIDTH] IN BLOOD BY AUTOMATED COUNT: 13 % (ref 10–15)
GFR SERPL CREATININE-BSD FRML MDRD: 87 ML/MIN/1.7M2
HCT VFR BLD AUTO: 34.3 % (ref 35–47)
HGB BLD-MCNC: 11.2 G/DL (ref 11.7–15.7)
MCH RBC QN AUTO: 27.9 PG (ref 26.5–33)
MCHC RBC AUTO-ENTMCNC: 32.7 G/DL (ref 31.5–36.5)
MCV RBC AUTO: 85 FL (ref 78–100)
PLATELET # BLD AUTO: 261 10E9/L (ref 150–450)
RBC # BLD AUTO: 4.02 10E12/L (ref 3.8–5.2)
WBC # BLD AUTO: 6.6 10E9/L (ref 4–11)

## 2018-03-13 PROCEDURE — 36415 COLL VENOUS BLD VENIPUNCTURE: CPT

## 2018-03-13 PROCEDURE — 84460 ALANINE AMINO (ALT) (SGPT): CPT

## 2018-03-13 PROCEDURE — 82565 ASSAY OF CREATININE: CPT

## 2018-03-13 PROCEDURE — 84450 TRANSFERASE (AST) (SGOT): CPT

## 2018-03-13 PROCEDURE — 85027 COMPLETE CBC AUTOMATED: CPT

## 2018-03-19 ENCOUNTER — HEALTH MAINTENANCE LETTER (OUTPATIENT)
Age: 58
End: 2018-03-19

## 2018-04-04 DIAGNOSIS — A31.0 PULMONARY DISEASE DUE TO MYCOBACTERIA (H): ICD-10-CM

## 2018-04-04 DIAGNOSIS — J47.9 BRONCHIECTASIS (H): ICD-10-CM

## 2018-04-04 DIAGNOSIS — J44.89 OBSTRUCTIVE CHRONIC BRONCHITIS WITHOUT EXACERBATION (H): ICD-10-CM

## 2018-04-04 LAB
ALT SERPL W P-5'-P-CCNC: 16 U/L (ref 7–52)
AST SERPL W P-5'-P-CCNC: 26 U/L (ref 13–39)
CREAT SERPL-MCNC: 0.83 MG/DL (ref 0.6–1.2)
ERYTHROCYTE [DISTWIDTH] IN BLOOD BY AUTOMATED COUNT: 13.6 % (ref 10–15)
GFR SERPL CREATININE-BSD FRML MDRD: 71 ML/MIN/1.7M2
HCT VFR BLD AUTO: 33.2 % (ref 35–47)
HGB BLD-MCNC: 11 G/DL (ref 11.7–15.7)
MCH RBC QN AUTO: 27.4 PG (ref 26.5–33)
MCHC RBC AUTO-ENTMCNC: 33.1 G/DL (ref 31.5–36.5)
MCV RBC AUTO: 83 FL (ref 78–100)
PLATELET # BLD AUTO: 261 10E9/L (ref 150–450)
RBC # BLD AUTO: 4.02 10E12/L (ref 3.8–5.2)
WBC # BLD AUTO: 5.8 10E9/L (ref 4–11)

## 2018-04-04 PROCEDURE — 84450 TRANSFERASE (AST) (SGOT): CPT

## 2018-04-04 PROCEDURE — 36415 COLL VENOUS BLD VENIPUNCTURE: CPT

## 2018-04-04 PROCEDURE — 84460 ALANINE AMINO (ALT) (SGPT): CPT

## 2018-04-04 PROCEDURE — 85027 COMPLETE CBC AUTOMATED: CPT

## 2018-04-04 PROCEDURE — 82565 ASSAY OF CREATININE: CPT

## 2018-04-25 ENCOUNTER — HEALTH MAINTENANCE LETTER (OUTPATIENT)
Age: 58
End: 2018-04-25

## 2018-05-25 DIAGNOSIS — J47.9 BRONCHIECTASIS (H): ICD-10-CM

## 2018-05-25 DIAGNOSIS — A31.0 PULMONARY DISEASE DUE TO MYCOBACTERIA (H): ICD-10-CM

## 2018-05-25 DIAGNOSIS — J44.89 OBSTRUCTIVE CHRONIC BRONCHITIS WITHOUT EXACERBATION (H): ICD-10-CM

## 2018-05-25 LAB
ALT SERPL W P-5'-P-CCNC: 12 U/L (ref 7–52)
AST SERPL W P-5'-P-CCNC: 21 U/L (ref 13–39)
CREAT SERPL-MCNC: 0.81 MG/DL (ref 0.6–1.2)
ERYTHROCYTE [DISTWIDTH] IN BLOOD BY AUTOMATED COUNT: 17.2 % (ref 10–15)
GFR SERPL CREATININE-BSD FRML MDRD: 73 ML/MIN/1.7M2
HCT VFR BLD AUTO: 34.6 % (ref 35–47)
HGB BLD-MCNC: 11.1 G/DL (ref 11.7–15.7)
MCH RBC QN AUTO: 26.9 PG (ref 26.5–33)
MCHC RBC AUTO-ENTMCNC: 32.1 G/DL (ref 31.5–36.5)
MCV RBC AUTO: 84 FL (ref 78–100)
PLATELET # BLD AUTO: 233 10E9/L (ref 150–450)
RBC # BLD AUTO: 4.13 10E12/L (ref 3.8–5.2)
WBC # BLD AUTO: 7 10E9/L (ref 4–11)

## 2018-05-25 PROCEDURE — 84450 TRANSFERASE (AST) (SGOT): CPT

## 2018-05-25 PROCEDURE — 85027 COMPLETE CBC AUTOMATED: CPT

## 2018-05-25 PROCEDURE — 82565 ASSAY OF CREATININE: CPT

## 2018-05-25 PROCEDURE — 84460 ALANINE AMINO (ALT) (SGPT): CPT

## 2018-05-25 PROCEDURE — 36415 COLL VENOUS BLD VENIPUNCTURE: CPT

## 2018-06-29 ENCOUNTER — OFFICE VISIT (OUTPATIENT)
Dept: FAMILY MEDICINE | Facility: OTHER | Age: 58
End: 2018-06-29
Attending: FAMILY MEDICINE
Payer: COMMERCIAL

## 2018-06-29 VITALS
WEIGHT: 128 LBS | RESPIRATION RATE: 16 BRPM | BODY MASS INDEX: 21.97 KG/M2 | SYSTOLIC BLOOD PRESSURE: 116 MMHG | DIASTOLIC BLOOD PRESSURE: 62 MMHG

## 2018-06-29 DIAGNOSIS — G25.81 RESTLESS LEGS SYNDROME (RLS): ICD-10-CM

## 2018-06-29 DIAGNOSIS — F51.01 PRIMARY INSOMNIA: Primary | ICD-10-CM

## 2018-06-29 DIAGNOSIS — J47.9 BRONCHIECTASIS (H): ICD-10-CM

## 2018-06-29 DIAGNOSIS — A31.0 PULMONARY DISEASE DUE TO MYCOBACTERIA (H): ICD-10-CM

## 2018-06-29 DIAGNOSIS — J44.89 OBSTRUCTIVE CHRONIC BRONCHITIS WITHOUT EXACERBATION (H): ICD-10-CM

## 2018-06-29 LAB
ALT SERPL W P-5'-P-CCNC: 10 U/L (ref 7–52)
AST SERPL W P-5'-P-CCNC: 18 U/L (ref 13–39)
CREAT SERPL-MCNC: 0.68 MG/DL (ref 0.6–1.2)
ERYTHROCYTE [DISTWIDTH] IN BLOOD BY AUTOMATED COUNT: 18.4 % (ref 10–15)
GFR SERPL CREATININE-BSD FRML MDRD: 90 ML/MIN/1.7M2
HCT VFR BLD AUTO: 37.8 % (ref 35–47)
HGB BLD-MCNC: 12.1 G/DL (ref 11.7–15.7)
MCH RBC QN AUTO: 26.5 PG (ref 26.5–33)
MCHC RBC AUTO-ENTMCNC: 32 G/DL (ref 31.5–36.5)
MCV RBC AUTO: 83 FL (ref 78–100)
PLATELET # BLD AUTO: 253 10E9/L (ref 150–450)
RBC # BLD AUTO: 4.56 10E12/L (ref 3.8–5.2)
WBC # BLD AUTO: 3.5 10E9/L (ref 4–11)

## 2018-06-29 PROCEDURE — 84450 TRANSFERASE (AST) (SGOT): CPT

## 2018-06-29 PROCEDURE — 36415 COLL VENOUS BLD VENIPUNCTURE: CPT

## 2018-06-29 PROCEDURE — 82565 ASSAY OF CREATININE: CPT

## 2018-06-29 PROCEDURE — 85027 COMPLETE CBC AUTOMATED: CPT

## 2018-06-29 PROCEDURE — 99213 OFFICE O/P EST LOW 20 MIN: CPT | Performed by: FAMILY MEDICINE

## 2018-06-29 PROCEDURE — 84460 ALANINE AMINO (ALT) (SGPT): CPT

## 2018-06-29 RX ORDER — ETHAMBUTOL HYDROCHLORIDE 400 MG/1
3 TABLET, FILM COATED ORAL DAILY
Refills: 11 | COMMUNITY
Start: 2018-06-05 | End: 2019-10-07

## 2018-06-29 RX ORDER — GABAPENTIN 100 MG/1
400 CAPSULE ORAL AT BEDTIME
Refills: 1 | COMMUNITY
Start: 2018-05-03 | End: 2018-11-20

## 2018-06-29 RX ORDER — AZITHROMYCIN 250 MG/1
1 TABLET, FILM COATED ORAL DAILY
Refills: 11 | COMMUNITY
Start: 2018-06-05 | End: 2018-12-31

## 2018-06-29 RX ORDER — TRAZODONE HYDROCHLORIDE 150 MG/1
150 TABLET ORAL AT BEDTIME
Qty: 90 TABLET | Refills: 3 | Status: SHIPPED | OUTPATIENT
Start: 2018-06-29 | End: 2020-01-16

## 2018-06-29 RX ORDER — PRAMIPEXOLE DIHYDROCHLORIDE 0.5 MG/1
0.5 TABLET ORAL AT BEDTIME
Qty: 90 TABLET | Refills: 3 | Status: SHIPPED | OUTPATIENT
Start: 2018-06-29 | End: 2018-11-20

## 2018-06-29 RX ORDER — RIFAMPIN 300 MG/1
2 CAPSULE ORAL DAILY
Refills: 11 | COMMUNITY
Start: 2018-06-05 | End: 2019-10-07

## 2018-06-29 ASSESSMENT — ANXIETY QUESTIONNAIRES
2. NOT BEING ABLE TO STOP OR CONTROL WORRYING: NOT AT ALL
6. BECOMING EASILY ANNOYED OR IRRITABLE: NOT AT ALL
GAD7 TOTAL SCORE: 0
5. BEING SO RESTLESS THAT IT IS HARD TO SIT STILL: NOT AT ALL
3. WORRYING TOO MUCH ABOUT DIFFERENT THINGS: NOT AT ALL
7. FEELING AFRAID AS IF SOMETHING AWFUL MIGHT HAPPEN: NOT AT ALL
1. FEELING NERVOUS, ANXIOUS, OR ON EDGE: NOT AT ALL
IF YOU CHECKED OFF ANY PROBLEMS ON THIS QUESTIONNAIRE, HOW DIFFICULT HAVE THESE PROBLEMS MADE IT FOR YOU TO DO YOUR WORK, TAKE CARE OF THINGS AT HOME, OR GET ALONG WITH OTHER PEOPLE: NOT DIFFICULT AT ALL

## 2018-06-29 ASSESSMENT — PATIENT HEALTH QUESTIONNAIRE - PHQ9: 5. POOR APPETITE OR OVEREATING: NOT AT ALL

## 2018-06-29 ASSESSMENT — ENCOUNTER SYMPTOMS
TREMORS: 0
FATIGUE: 0
SLEEP DISTURBANCE: 1
NERVOUS/ANXIOUS: 1

## 2018-06-29 ASSESSMENT — PAIN SCALES - GENERAL: PAINLEVEL: MILD PAIN (2)

## 2018-06-29 NOTE — MR AVS SNAPSHOT
After Visit Summary   6/29/2018    Lisy Telles    MRN: [id number]           Patient Information     Date Of Birth          1960        Visit Information        Provider Department      6/29/2018 1:30 PM Cas Sanches MD [facility]        Today's Diagnoses     Primary insomnia    -  1    Restless legs syndrome (RLS)           Follow-ups after your visit        Follow-up notes from your care team     Return if symptoms worsen or fail to improve.      Who to contact     If you have questions or need follow up information about today's clinic visit or your schedule please contact [facility] directly at [phone].  Normal or non-critical lab and imaging results will be communicated to you by Verslyhart, letter or phone within 4 business days after the clinic has received the results. If you do not hear from us within 7 days, please contact the clinic through Verslyhart or phone. If you have a critical or abnormal lab result, we will notify you by phone as soon as possible.  Submit refill requests through Manga Corta or call your pharmacy and they will forward the refill request to us. Please allow 3 business days for your refill to be completed.          Additional Information About Your Visit        MyChart Information     Manga Corta gives you secure access to your electronic health record. If you see a primary care provider, you can also send messages to your care team and make appointments. If you have questions, please call your primary care clinic.  If you do not have a primary care provider, please call [phone] and they will assist you.        Care EveryWhere ID     This is your Care EveryWhere ID. This could be used by other organizations to access your [facility] medical records  [id number]        Your Vitals Were     Respirations BMI (Body Mass Index)                16 21.97 kg/m2           Blood Pressure from Last 3 Encounters:   06/29/18 116/62    12/11/17 110/70   11/27/17 116/64    Weight from Last 3 Encounters:   06/29/18 128 lb (58.1 kg)   12/11/17 125 lb (56.7 kg)   11/27/17 126 lb 12.8 oz (57.5 kg)              Today, you had the following     No orders found for display         Today's Medication Changes          These changes are accurate as of 6/29/18  2:05 PM.  If you have any questions, ask your nurse or doctor.               Start taking these medicines.        Dose/Directions    pramipexole 0.5 MG tablet   Commonly known as:  MIRAPEX   Used for:  Primary insomnia   Started by:  Cas Sanches MD        Dose:  0.5 mg   Take 1 tablet (0.5 mg) by mouth At Bedtime   Quantity:  90 tablet   Refills:  3         These medicines have changed or have updated prescriptions.        Dose/Directions    * traZODone 50 MG tablet   Commonly known as:  DESYREL   This may have changed:  Another medication with the same name was added. Make sure you understand how and when to take each.   Changed by:  Cas Sanches MD        Dose:  1-3 tablet   Take 1-3 tablets by mouth At Bedtime   Refills:  0       * traZODone 150 MG tablet   Commonly known as:  DESYREL   This may have changed:  You were already taking a medication with the same name, and this prescription was added. Make sure you understand how and when to take each.   Used for:  Primary insomnia   Changed by:  Cas Sanches MD        Dose:  150 mg   Take 1 tablet (150 mg) by mouth At Bedtime   Quantity:  90 tablet   Refills:  3       * Notice:  This list has 2 medication(s) that are the same as other medications prescribed for you. Read the directions carefully, and ask your doctor or other care provider to review them with you.         Where to get your medicines      These medications were sent to [id number] - [place], [place] - [address] [facility] [address]     Phone:  [phone]     pramipexole 0.5 MG tablet    traZODone 150 MG tablet                 Primary Care Provider Office Phone # Fax #    Silvino Peace [phone] [address]   [facility] [address]        Equal Access to Services     MARILUSANTANA VANDANA : Arpit tacos wolfe jean Gee, wamaryda luqadaha, qaybta kaalmada iza, anusha jose lavaishaliwallace phillip. So [facility] [phone].    ATENCIÓN: Si habla español, tiene a flynn disposición servicios gratuitos de asistencia lingüística. Llame al [phone].    We comply with applicable federal civil rights laws and [place] laws. We do not discriminate on the basis of race, color, national origin, age, disability, sex, sexual orientation, or gender identity.            Thank you!     Thank you for choosing [facility]  for your care. Our goal is always to provide you with excellent care. Hearing back from our patients is one way we can continue to improve our services. Please take a few minutes to complete the written survey that you may receive in the mail after your visit with us. Thank you!             Your Updated Medication List - Protect others around you: Learn how to safely use, store and throw away your medicines at www.disposemymeds.org.          This list is accurate as of 6/29/18  2:05 PM.  Always use your most recent med list.                   Brand Name Dispense Instructions for use Diagnosis    * albuterol 108 (90 Base) MCG/ACT Inhaler    PROAIR HFA/PROVENTIL HFA/VENTOLIN HFA     Inhale 2 puffs into the lungs 4 times daily as needed        * albuterol (2.5 MG/3ML) 0.083% neb solution      Take 1 vial by nebulization 4 times daily        alendronate 70 MG tablet    FOSAMAX     Take 1 tablet by mouth once a week in the morning. Take on empty stomach with full glass of water. Do not lie down for 1 hr.        azithromycin 250 MG tablet    ZITHROMAX     Take 1 tablet by mouth daily        ethambutol 400 MG tablet    MYAMBUTOL     Take 3 tablets by mouth daily         fluticasone 50 MCG/ACT spray    FLONASE     Spray 1-2 sprays into both nostrils daily To prevent allergy symptoms. Must be used regularly to be effective.        gabapentin 100 MG capsule    NEURONTIN     Take 400 capsules by mouth At Bedtime        HYDROcodone-acetaminophen 5-325 MG per tablet    NORCO     Take 1 tablet by mouth every 6 hours as needed for pain        hydrOXYzine 10 MG tablet    ATARAX     Take 1-2 tablets by mouth 3 times daily as needed        lisinopril 5 MG tablet    PRINIVIL/ZESTRIL     Take 5 mg by mouth daily        pramipexole 0.5 MG tablet    MIRAPEX    90 tablet    Take 1 tablet (0.5 mg) by mouth At Bedtime    Primary insomnia       rifampin 300 MG capsule    RIFADIN     Take 2 capsules by mouth daily        sodium chloride 3 % Nebu neb solution      INHALE 3ML AS DIRECTED        * traZODone 50 MG tablet    DESYREL     Take 1-3 tablets by mouth At Bedtime        * traZODone 150 MG tablet    DESYREL    90 tablet    Take 1 tablet (150 mg) by mouth At Bedtime    Primary insomnia       venlafaxine 150 MG 24 hr capsule    EFFEXOR-XR     Take 300 mg by mouth daily with food        witch hazel-glycerin pad    TUCKS     Apply 1 applicator topically        * Notice:  This list has 4 medication(s) that are the same as other medications prescribed for you. Read the directions carefully, and ask your doctor or other care provider to review them with you.

## 2018-06-29 NOTE — PROGRESS NOTES
SUBJECTIVE:   Lisy Telles is a 57 year old female who presents to clinic today for the following health issues:    HPI  Here to talk about restless leg syndrome. Has had this for about 6 months.  Was given neurontin at one point, says it really helped.  Would like to get back on it.    Has a hard time falling asleep, at least partly due to a sense she has to move her legs.  Has been on trazodone for this which works pretty good overall.  She did in patient chem dep treatment last fall and has been sober now since Sept 27 2017.  She had been getting neurontin from a psych provider in [place].  Pt had to stop going due to insurance no longer covering those visits.  Had been getting 150 milligram trazodone as well.    Lots of worrying, son is now in longterm for murder, lifetime.    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) 10/05/2017     Priority: Medium     Lung nodule < 6cm on CT 10/05/2017     Priority: Medium     Age-related osteoporosis with current pathological fracture with routine healing 09/20/2017     Priority: Medium     Bronchiectasis with acute lower respiratory infection (H) 09/13/2017     Priority: Medium     Tobacco abuse 09/11/2017     Priority: Medium     Axillary nerve palsy 06/24/2016     Priority: Medium     Closed 4-part fracture of proximal end of left humerus 06/07/2016     Priority: Medium     Liver laceration, grade IV, with open wound into cavity 09/04/2015     Priority: Medium     Pneumothorax, closed, traumatic 09/04/2015     Priority: Medium     Spleen laceration 09/04/2015     Priority: Medium     Traumatic brain injury with loss of consciousness of 30 minutes or  less (H) 09/04/2015     Priority: Medium     Alcohol withdrawal (H) 04/08/2015     Priority: Medium     Hypertension 07/14/2014     Priority: Medium     Sprain of unspecified site of back 08/15/2012     Priority: Medium     Dysthymic disorder 10/13/2010     Priority: Medium     Past Surgical History:   Procedure Laterality Date     CLOSED REDUCTION ANKLE      12/26/07,Open reduction/internal fixation, right ankle fracture 12/26/07.     COLONOSCOPY      2007,screening for family history,  follow-up recommended in five years.     COLONOSCOPY      2007,Colonoscopy April 2007.  Follow-up recommended in five years.     COLONOSCOPY      2/2013,Normal, hemorrhoids - follow up 5 years for family history of colon cnacer     HYSTERECTOMY TOTAL ABDOMINAL      04/26/2007,Laparoscopic supracervical hysterectomy and bilateral salpingo-oophorectomy for bleeding fibroid     LAPAROSCOPIC BYPASS GASTRIC      2007     LAPAROSCOPIC TUBAL LIGATION      1985     LUMPECTOMY BREAST      Lumpectomy of the right breast     OTHER SURGICAL HISTORY      03/822007,[id number],OTHER,Skin grafting post right ankle fracture     Social History   Substance Use Topics     Smoking status: Current Every Day Smoker     Packs/day: 1.00     Years: 43.00     Types: Cigarettes     Smokeless tobacco: Never Used     Alcohol use No      Comment: Alcoholic Drinks/day: 4-5 beers and 1/2 pt vodka daily     Current Outpatient Prescriptions   Medication Sig Dispense Refill     albuterol (2.5 MG/3ML) 0.083% neb solution Take 1 vial by nebulization 4 times daily       albuterol (PROAIR HFA/PROVENTIL HFA/VENTOLIN HFA) 108 (90 BASE) MCG/ACT Inhaler Inhale 2 puffs into the lungs 4 times daily as needed       alendronate (FOSAMAX) 70 MG tablet Take 1 tablet by mouth once a week in the morning. Take on empty stomach with full glass of water. Do not lie down for 1 hr.       azithromycin (ZITHROMAX) 250 MG tablet Take 1 tablet by mouth daily  11     ethambutol (MYAMBUTOL) 400 MG  tablet Take 3 tablets by mouth daily  11     fluticasone (FLONASE) 50 MCG/ACT spray Spray 1-2 sprays into both nostrils daily To prevent allergy symptoms. Must be used regularly to be effective.       gabapentin (NEURONTIN) 100 MG capsule Take 400 capsules by mouth At Bedtime  1     HYDROcodone-acetaminophen (NORCO) 5-325 MG per tablet Take 1 tablet by mouth every 6 hours as needed for pain       hydrOXYzine (ATARAX) 10 MG tablet Take 1-2 tablets by mouth 3 times daily as needed       lisinopril (PRINIVIL/ZESTRIL) 5 MG tablet Take 5 mg by mouth daily       pramipexole (MIRAPEX) 0.5 MG tablet Take 1 tablet (0.5 mg) by mouth At Bedtime 90 tablet 3     rifampin (RIFADIN) 300 MG capsule Take 2 capsules by mouth daily  11     sodium chloride 3 % NEBU neb solution INHALE 3ML AS DIRECTED       traZODone (DESYREL) 150 MG tablet Take 1 tablet (150 mg) by mouth At Bedtime 90 tablet 3     traZODone (DESYREL) 50 MG tablet Take 1-3 tablets by mouth At Bedtime       venlafaxine (EFFEXOR-XR) 150 MG 24 hr capsule Take 300 mg by mouth daily with food       witch hazel-glycerin (TUCKS) pad Apply 1 applicator topically       Allergies   Allergen Reactions     Penicillins Anaphylaxis       Review of Systems   Constitutional: Negative for fatigue.   Neurological: Negative for tremors.   Psychiatric/Behavioral: Positive for sleep disturbance. The patient is nervous/anxious.         OBJECTIVE:     /62 (BP Location: Right arm, Patient Position: Sitting, Cuff Size: Adult Regular)  Resp 16  Wt 128 lb (58.1 kg)  BMI 21.97 kg/m2  Body mass index is 21.97 kg/(m^2).  Physical Exam   Constitutional: She is oriented to person, place, and time. She appears well-developed. No distress.   Neurological: She is alert and oriented to person, place, and time.   Skin: She is not diaphoretic.   Psychiatric: She has a normal mood and affect. Her behavior is normal. Thought content normal.       Diagnostic Test Results:  none     ASSESSMENT/PLAN:          (F51.01) Primary insomnia  (primary encounter diagnosis)  Comment: chronic  Plan: pramipexole (MIRAPEX) 0.5 MG tablet, traZODone         (DESYREL) 150 MG tablet        We also talked about non med treatment, such as avoiding caffeine, sleeping in a quiet room, going to bed at the same time nightly.    (G25.81) Restless legs syndrome (RLS)  Comment: new diagnosis   Plan: Neurontin is relatively contraindicated if she has alcoholism.  Will instead try the mirapex first.  Follow up as needed .      Cas Sanches MD  [facility]

## 2018-06-30 ASSESSMENT — ANXIETY QUESTIONNAIRES: GAD7 TOTAL SCORE: 0

## 2018-07-23 NOTE — PROGRESS NOTES
Patient Information     Patient Name  Lisy Telles MRN  [id number] Sex  Female   1960      Letter by Laquita Jeffrey MD at      Author:  Laquita Jeffrey MD Service:  (none) Author Type:  (none)    Filed:   Encounter Date:  2017 Status:  (Other)           Lisy Telles  [address] [address]          2017    Dear Ms. Telles:    This is to remind you that you are coming due for your annual office visit with Silvino Peace MD to support continued use of lisinopril as well as diagnosis of hypertension. Your last visit was on 16. Additional refills of your medication require you to complete this visit.    Please call [phone] to schedule your appointment.    Thank you for choosing [facility] for your health care needs.    Sincerely,      Refill MAJO  [facility]

## 2018-07-23 NOTE — PROGRESS NOTES
Patient Information     Patient Name  Lisy Telles MRN  [id number] Sex  Female   1960      Letter by Jose Guadalupe Guo MD at      Author:  Jose Guadalupe Guo MD Service:  (none) Author Type:  (none)    Filed:   Encounter Date:  2017 Status:  (Other)           Lisy Telles  [address] [facility] [address]          2017      CERTIFICATE TO RETURN TO WORK OR SCHOOL      Lisy Telles was seen in clinic 2017.  She can return to work with the following restrictions:    Needs to be seated at all times.   Needs to use a walker for ambulation.  No lifting, pushing or pulling greater then 10 pounds.    Will be seen in 2 weeks for re-evaluation.    Sincerely,        Jose Guadalupe Guo MD

## 2018-07-24 NOTE — PROGRESS NOTES
Patient Information     Patient Name  Lisy Telles MRN  [id number] Sex  Female   1960      Letter by Jose Guadalupe Guo MD at      Author:  Jose Guadalupe Guo MD Service:  (none) Author Type:  (none)    Filed:   Encounter Date:  2017 Status:  (Other)           Lisy Telles  [address] [facility] [address]          2017      CERTIFICATE TO RETURN TO WORK OR SCHOOL      Lisy Telles was seen in clinic 2017.  She can return to work without any restrictions at this time.  Feel free to contact me with any concerns.    Sincerely,          Jose Guadalupe Guo MD

## 2018-07-24 NOTE — PROGRESS NOTES
Patient Information     Patient Name  Lisy Telles MRN  [id number] Sex  Female   1960      Letter by Pao Sexton at      Author:  aPo Sexton Service:  (none) Author Type:  (none)    Filed:   Encounter Date:  1/3/2018 Status:  (Other)           Lisy Telles  [address] [facility] [address]          January 3, 2018    Dear Ms. Telles:    It has come to our attention that you are due for a colonoscopy.  According to our records, your last colonoscopy was done on 2013.  It  is time for your repeat colonoscopy.  Please contact the Surgery department at [phone] and we will be happy to set up this colonoscopy for you.  If you have had a colonoscopy since your last one with us, please let us know so we can update our records.      Sincerely,       Pao ACHARYA LPN  General Surgery      Reviewed and electronically signed by provider.

## 2018-09-13 DIAGNOSIS — R06.2 WHEEZING: Primary | ICD-10-CM

## 2018-09-14 RX ORDER — ALBUTEROL SULFATE 90 UG/1
2 AEROSOL, METERED RESPIRATORY (INHALATION) 4 TIMES DAILY PRN
Qty: 3 INHALER | Refills: 2 | Status: SHIPPED | OUTPATIENT
Start: 2018-09-14 | End: 2020-01-16

## 2018-09-14 NOTE — TELEPHONE ENCOUNTER
LOV with a provider at [facility] was with Dr. Sanches on 6/29/18. Rx as requested is noted in office visit notes on that date with no changes. Patient was to follow up on an as needed basis. Writer will refill Rx as requested.    Prescription refilled per RN Medication Refill Policy..................Kenny Chamorro 9/14/2018 4:05 PM

## 2018-09-28 DIAGNOSIS — I10 ESSENTIAL HYPERTENSION: Primary | ICD-10-CM

## 2018-10-02 RX ORDER — LISINOPRIL 5 MG/1
TABLET ORAL
Qty: 90 TABLET | Refills: 0 | Status: SHIPPED | OUTPATIENT
Start: 2018-10-02 | End: 2019-03-21

## 2018-10-02 NOTE — TELEPHONE ENCOUNTER
Prescription approved per [facility] Refill Protocol.  Qi Caruso RN............................ 10/2/2018 11:16 AM

## 2018-10-09 ENCOUNTER — HOSPITAL ENCOUNTER (OUTPATIENT)
Dept: GENERAL RADIOLOGY | Facility: OTHER | Age: 58
Discharge: HOME | End: 2018-10-09
Attending: SURGERY | Admitting: FAMILY MEDICINE
Payer: COMMERCIAL

## 2018-10-09 DIAGNOSIS — R91.1 SOLITARY PULMONARY NODULE: ICD-10-CM

## 2018-10-09 PROCEDURE — 71046 X-RAY EXAM CHEST 2 VIEWS: CPT

## 2018-11-20 ENCOUNTER — OFFICE VISIT (OUTPATIENT)
Dept: FAMILY MEDICINE | Facility: OTHER | Age: 58
End: 2018-11-20
Attending: FAMILY MEDICINE
Payer: COMMERCIAL

## 2018-11-20 VITALS
HEART RATE: 68 BPM | WEIGHT: 125.2 LBS | SYSTOLIC BLOOD PRESSURE: 118 MMHG | BODY MASS INDEX: 21.49 KG/M2 | DIASTOLIC BLOOD PRESSURE: 68 MMHG | RESPIRATION RATE: 16 BRPM

## 2018-11-20 DIAGNOSIS — M65.4 TENOSYNOVITIS, DE QUERVAIN: Primary | ICD-10-CM

## 2018-11-20 DIAGNOSIS — J44.89 COPD WITH CHRONIC BRONCHITIS AND EMPHYSEMA (H): ICD-10-CM

## 2018-11-20 DIAGNOSIS — G25.81 RESTLESS LEGS SYNDROME (RLS): ICD-10-CM

## 2018-11-20 DIAGNOSIS — J43.9 COPD WITH CHRONIC BRONCHITIS AND EMPHYSEMA (H): ICD-10-CM

## 2018-11-20 DIAGNOSIS — G56.03 BILATERAL CARPAL TUNNEL SYNDROME: ICD-10-CM

## 2018-11-20 PROCEDURE — 99214 OFFICE O/P EST MOD 30 MIN: CPT | Performed by: FAMILY MEDICINE

## 2018-11-20 RX ORDER — ALBUTEROL SULFATE 0.83 MG/ML
2.5 SOLUTION RESPIRATORY (INHALATION) 4 TIMES DAILY
Qty: 360 ML | Refills: 3 | Status: SHIPPED | OUTPATIENT
Start: 2018-11-20 | End: 2022-01-25

## 2018-11-20 RX ORDER — GABAPENTIN 400 MG/1
400 CAPSULE ORAL AT BEDTIME
Qty: 90 CAPSULE | Refills: 3 | Status: SHIPPED | OUTPATIENT
Start: 2018-11-20 | End: 2019-01-04

## 2018-11-20 ASSESSMENT — ANXIETY QUESTIONNAIRES
IF YOU CHECKED OFF ANY PROBLEMS ON THIS QUESTIONNAIRE, HOW DIFFICULT HAVE THESE PROBLEMS MADE IT FOR YOU TO DO YOUR WORK, TAKE CARE OF THINGS AT HOME, OR GET ALONG WITH OTHER PEOPLE: NOT DIFFICULT AT ALL
5. BEING SO RESTLESS THAT IT IS HARD TO SIT STILL: NOT AT ALL
2. NOT BEING ABLE TO STOP OR CONTROL WORRYING: NOT AT ALL
1. FEELING NERVOUS, ANXIOUS, OR ON EDGE: SEVERAL DAYS
3. WORRYING TOO MUCH ABOUT DIFFERENT THINGS: MORE THAN HALF THE DAYS
6. BECOMING EASILY ANNOYED OR IRRITABLE: NOT AT ALL
7. FEELING AFRAID AS IF SOMETHING AWFUL MIGHT HAPPEN: NOT AT ALL
GAD7 TOTAL SCORE: 3

## 2018-11-20 ASSESSMENT — PAIN SCALES - GENERAL: PAINLEVEL: SEVERE PAIN (6)

## 2018-11-20 ASSESSMENT — PATIENT HEALTH QUESTIONNAIRE - PHQ9: 5. POOR APPETITE OR OVEREATING: NOT AT ALL

## 2018-11-20 NOTE — MR AVS SNAPSHOT
After Visit Summary   11/20/2018    Lisy Telles    MRN: [id number]           Patient Information     Date Of Birth          1960        Visit Information        Provider Department      11/20/2018 10:45 AM Silvino Peace MD [facility]        Today's Diagnoses     Tenosynovitis, de Quervain    -  1    Bilateral carpal tunnel syndrome        Restless legs syndrome (RLS)        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)          Care Instructions    Wear braces for work and for sleep plus any aggravating activities  Follow-up in a couple weeks          Follow-ups after your visit        Your next 10 appointments already scheduled     Dec 04, 2018 11:15 AM CST   SHORT with Silvino Peace MD   [facility] ([facility])    [address]  [facility] [address] [phone]              Who to contact     If you have questions or need follow up information about today's clinic visit or your schedule please contact [facility] directly at [phone].  Normal or non-critical lab and imaging results will be communicated to you by Lucidity (MemberRx)hart, letter or phone within 4 business days after the clinic has received the results. If you do not hear from us within 7 days, please contact the clinic through [employer] or phone. If you have a critical or abnormal lab result, we will notify you by phone as soon as possible.  Submit refill requests through Superbly or call your pharmacy and they will forward the refill request to us. Please allow 3 business days for your refill to be completed.          Additional Information About Your Visit        Lucidity (MemberRx)hart Information     Superbly gives you secure access to your electronic health record. If you see a primary care provider, you can also send messages to your care team and make appointments. If you have questions, please call your primary care clinic.  If you do  not have a primary care provider, please call [phone] and they will assist you.        Care EveryWhere ID     This is your Care EveryWhere ID. This could be used by other organizations to access your [facility] medical records  [id number]        Your Vitals Were     Pulse Respirations BMI (Body Mass Index)             68 16 21.49 kg/m2          Blood Pressure from Last 3 Encounters:   11/20/18 118/68   06/29/18 116/62   12/11/17 110/70    Weight from Last 3 Encounters:   11/20/18 125 lb 3.2 oz (56.8 kg)   06/29/18 128 lb (58.1 kg)   12/11/17 125 lb (56.7 kg)              Today, you had the following     No orders found for display         Today's Medication Changes          These changes are accurate as of 11/20/18 11:59 PM.  If you have any questions, ask your nurse or doctor.               These medicines have changed or have updated prescriptions.        Dose/Directions    gabapentin 400 MG capsule   Commonly known as:  NEURONTIN   This may have changed:    - medication strength  - how much to take   Used for:  Restless legs syndrome (RLS)   Changed by:  Silvino Paece MD        Dose:  400 mg   Take 1 capsule (400 mg) by mouth At Bedtime   Quantity:  90 capsule   Refills:  3         Stop taking these medicines if you haven't already. Please contact your care team if you have questions.     HYDROcodone-acetaminophen 5-325 MG tablet   Commonly known as:  NORCO   Stopped by:  Silvino Peace MD           pramipexole 0.5 MG tablet   Commonly known as:  MIRAPEX   Stopped by:  Silvino Peace MD                Where to get your medicines      These medications were sent to [id number] - [place], [place] - [address], [facility] [address]     Phone:  [phone]     albuterol (2.5 MG/3ML) 0.083% neb solution    gabapentin 400 MG capsule                Primary Care Provider Office Phone # Fax #    Silvino Peace [phone]  [phone]       [address]  [place] [address]        Equal Access to Services     MARILUSANTANA VANDANA : Hadii tacos wlofe jean Gee, wamaryda wilkathrynha, kristenta kajulianada shardadebbie, anusha leeannain hayaawallace robincindy jose gomez phillip. So [facility] [phone].    ATENCIÓN: Si habla español, tiene a flynn disposición servicios gratuitos de asistencia lingüística. Llame al [phone].    We comply with applicable federal civil rights laws and [place] laws. We do not discriminate on the basis of race, color, national origin, age, disability, sex, sexual orientation, or gender identity.            Thank you!     Thank you for choosing [facility]  for your care. Our goal is always to provide you with excellent care. Hearing back from our patients is one way we can continue to improve our services. Please take a few minutes to complete the written survey that you may receive in the mail after your visit with us. Thank you!             Your Updated Medication List - Protect others around you: Learn how to safely use, store and throw away your medicines at www.disposemymeds.org.          This list is accurate as of 11/20/18 11:59 PM.  Always use your most recent med list.                   Brand Name Dispense Instructions for use Diagnosis    * albuterol 108 (90 Base) MCG/ACT inhaler    PROAIR HFA    3 Inhaler    Inhale 2 puffs into the lungs 4 times daily as needed    Wheezing       * albuterol (2.5 MG/3ML) 0.083% neb solution     360 mL    Take 1 vial (2.5 mg) by nebulization 4 times daily    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)       alendronate 70 MG tablet    FOSAMAX     Take 1 tablet by mouth once a week in the morning. Take on empty stomach with full glass of water. Do not lie down for 1 hr.        azithromycin 250 MG tablet    ZITHROMAX     Take 1 tablet by mouth daily        ethambutol 400 MG tablet    MYAMBUTOL     Take 3 tablets by mouth daily        fluticasone 50 MCG/ACT spray     FLONASE     Spray 1-2 sprays into both nostrils daily To prevent allergy symptoms. Must be used regularly to be effective.        gabapentin 400 MG capsule    NEURONTIN    90 capsule    Take 1 capsule (400 mg) by mouth At Bedtime    Restless legs syndrome (RLS)       hydrOXYzine 10 MG tablet    ATARAX     Take 1-2 tablets by mouth 3 times daily as needed        lisinopril 5 MG tablet    PRINIVIL/ZESTRIL    90 tablet    TAKE ONE TABLET BY MOUTH EVERY DAY    Essential hypertension       rifampin 300 MG capsule    RIFADIN     Take 2 capsules by mouth daily        sodium chloride 3 % Nebu neb solution      INHALE 3ML AS DIRECTED        * traZODone 50 MG tablet    DESYREL     Take 1-3 tablets by mouth At Bedtime        * traZODone 150 MG tablet    DESYREL    90 tablet    Take 1 tablet (150 mg) by mouth At Bedtime    Primary insomnia       venlafaxine 150 MG 24 hr capsule    EFFEXOR-XR     Take 300 mg by mouth daily with food        witch hazel-glycerin pad    TUCKS     Apply 1 applicator topically        * Notice:  This list has 4 medication(s) that are the same as other medications prescribed for you. Read the directions carefully, and ask your doctor or other care provider to review them with you.

## 2018-11-20 NOTE — PROGRESS NOTES
Nursing Notes:   Estefanía Sahni, LPN  11/20/2018 11:19 AM  Signed  Pt presents to clinic today for bilateral wrist and hand pain x 2 weeks ago.  Estefanía Sahni     SUBJECTIVE:  58 year old female presents for bilateral wrist pain for a couple weeks. She started working at [employer] a couple months ago. Using hands a lot. Tingling in 1st and 3rd fingers on both hands, worse on the left. Started wearing braces at work and at night about 10 days ago. It has helped some. She has not been treated for CTS before.     Would like to resume gabapentin for RLS. It helped more than Mirapex is helping.     Needs refill on albuterol nebs for COPD.     Current Outpatient Prescriptions   Medication Sig Dispense Refill     albuterol (2.5 MG/3ML) 0.083% neb solution Take 1 vial by nebulization 4 times daily       albuterol (PROAIR HFA) 108 (90 Base) MCG/ACT inhaler Inhale 2 puffs into the lungs 4 times daily as needed 3 Inhaler 2     alendronate (FOSAMAX) 70 MG tablet Take 1 tablet by mouth once a week in the morning. Take on empty stomach with full glass of water. Do not lie down for 1 hr.       azithromycin (ZITHROMAX) 250 MG tablet Take 1 tablet by mouth daily  11     ethambutol (MYAMBUTOL) 400 MG tablet Take 3 tablets by mouth daily  11     fluticasone (FLONASE) 50 MCG/ACT spray Spray 1-2 sprays into both nostrils daily To prevent allergy symptoms. Must be used regularly to be effective.       gabapentin (NEURONTIN) 400 MG capsule Take 1 capsule (400 mg) by mouth At Bedtime 90 capsule 3     hydrOXYzine (ATARAX) 10 MG tablet Take 1-2 tablets by mouth 3 times daily as needed       lisinopril (PRINIVIL/ZESTRIL) 5 MG tablet TAKE ONE TABLET BY MOUTH EVERY DAY 90 tablet 0     rifampin (RIFADIN) 300 MG capsule Take 2 capsules by mouth daily  11     sodium chloride 3 % NEBU neb solution INHALE 3ML AS DIRECTED       traZODone (DESYREL) 150 MG tablet Take 1 tablet (150 mg) by mouth At Bedtime 90 tablet 3     traZODone (DESYREL) 50 MG tablet  Take 1-3 tablets by mouth At Bedtime       venlafaxine (EFFEXOR-XR) 150 MG 24 hr capsule Take 300 mg by mouth daily with food       witch hazel-glycerin (TUCKS) pad Apply 1 applicator topically       [DISCONTINUED] gabapentin (NEURONTIN) 100 MG capsule Take 400 capsules by mouth At Bedtime  1     Allergies   Allergen Reactions     Penicillins Anaphylaxis       OBJECTIVE:  /68 (BP Location: Right arm, Patient Position: Chair, Cuff Size: Adult Regular)  Pulse 68  Resp 16  Wt 125 lb 3.2 oz (56.8 kg)  BMI 21.49 kg/m2    EXAM:  General Appearance: Alert. No acute distress  Musculoskeletal: No muscle wasting in hands. Positive Finkelstein test on left. Tender over radial wrist on left.  Neurological: Positive Tinel sign both wrists. Strength in hands intact  Psychiatric: Normal affect and mentation        ASSESSMENT/PLAN:    ICD-10-CM    1. Tenosynovitis, de Quervain M65.4    2. Bilateral carpal tunnel syndrome G56.03    3. Restless legs syndrome (RLS) G25.81 gabapentin (NEURONTIN) 400 MG capsule   4. 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) J44.9 albuterol (2.5 MG/3ML) 0.083% neb solution       Has some tendonitis symptoms on both sides, but specifically thumb abductor tendonitis on left. Given thumb spica to help with de Quervain's. Will also help with carpal tunnel. Continue right wrist brace. Discussed treatments, but if she keeps overusing hands, then will continue to have problems. Modify work activities at [employer]. Given work restrictions to reduce use of both hands. No other restrictions on activity.     Refilled gabapentin. Stop Mirapex  Refilled albuterol  F/U in a couple weeks    Silvino Peace MD    This document was prepared using a combination of typing and voice generated software.  While every attempt was made for accuracy, spelling and grammatical errors may exist.

## 2018-11-21 ENCOUNTER — TELEPHONE (OUTPATIENT)
Dept: FAMILY MEDICINE | Facility: OTHER | Age: 58
End: 2018-11-21

## 2018-11-21 ASSESSMENT — ANXIETY QUESTIONNAIRES: GAD7 TOTAL SCORE: 3

## 2018-12-04 ENCOUNTER — OFFICE VISIT (OUTPATIENT)
Dept: FAMILY MEDICINE | Facility: OTHER | Age: 58
End: 2018-12-04
Attending: FAMILY MEDICINE
Payer: OTHER MISCELLANEOUS

## 2018-12-04 VITALS
DIASTOLIC BLOOD PRESSURE: 92 MMHG | BODY MASS INDEX: 21.8 KG/M2 | RESPIRATION RATE: 16 BRPM | WEIGHT: 127 LBS | HEART RATE: 66 BPM | SYSTOLIC BLOOD PRESSURE: 148 MMHG

## 2018-12-04 DIAGNOSIS — M65.4 DE QUERVAIN'S DISEASE (TENOSYNOVITIS): ICD-10-CM

## 2018-12-04 DIAGNOSIS — G56.03 BILATERAL CARPAL TUNNEL SYNDROME: Primary | ICD-10-CM

## 2018-12-04 PROCEDURE — 99213 OFFICE O/P EST LOW 20 MIN: CPT | Performed by: FAMILY MEDICINE

## 2018-12-04 ASSESSMENT — ANXIETY QUESTIONNAIRES
IF YOU CHECKED OFF ANY PROBLEMS ON THIS QUESTIONNAIRE, HOW DIFFICULT HAVE THESE PROBLEMS MADE IT FOR YOU TO DO YOUR WORK, TAKE CARE OF THINGS AT HOME, OR GET ALONG WITH OTHER PEOPLE: NOT DIFFICULT AT ALL
7. FEELING AFRAID AS IF SOMETHING AWFUL MIGHT HAPPEN: NOT AT ALL
2. NOT BEING ABLE TO STOP OR CONTROL WORRYING: NOT AT ALL
3. WORRYING TOO MUCH ABOUT DIFFERENT THINGS: MORE THAN HALF THE DAYS
1. FEELING NERVOUS, ANXIOUS, OR ON EDGE: MORE THAN HALF THE DAYS
5. BEING SO RESTLESS THAT IT IS HARD TO SIT STILL: NOT AT ALL
6. BECOMING EASILY ANNOYED OR IRRITABLE: NOT AT ALL
GAD7 TOTAL SCORE: 4

## 2018-12-04 ASSESSMENT — PATIENT HEALTH QUESTIONNAIRE - PHQ9: 5. POOR APPETITE OR OVEREATING: NOT AT ALL

## 2018-12-04 ASSESSMENT — PAIN SCALES - GENERAL: PAINLEVEL: MILD PAIN (2)

## 2018-12-04 NOTE — MR AVS SNAPSHOT
After Visit Summary   12/4/2018    Lisy Telles    MRN: [id number]           Patient Information     Date Of Birth          1960        Visit Information        Provider Department      12/4/2018 11:15 AM Silvino Peace MD [facility]        Today's Diagnoses     Bilateral carpal tunnel syndrome    -  1    De Quervain's disease (tenosynovitis)           Follow-ups after your visit        Who to contact     If you have questions or need follow up information about today's clinic visit or your schedule please contact [facility] directly at [phone].  Normal or non-critical lab and imaging results will be communicated to you by Yazinohart, letter or phone within 4 business days after the clinic has received the results. If you do not hear from us within 7 days, please contact the clinic through [employer] or phone. If you have a critical or abnormal lab result, we will notify you by phone as soon as possible.  Submit refill requests through Minka or call your pharmacy and they will forward the refill request to us. Please allow 3 business days for your refill to be completed.          Additional Information About Your Visit        MyChart Information     Minka gives you secure access to your electronic health record. If you see a primary care provider, you can also send messages to your care team and make appointments. If you have questions, please call your primary care clinic.  If you do not have a primary care provider, please call [phone] and they will assist you.        Care EveryWhere ID     This is your Care EveryWhere ID. This could be used by other organizations to access your [facility] medical records  [id number]        Your Vitals Were     Pulse Respirations BMI (Body Mass Index)             66 16 21.8 kg/m2          Blood Pressure from Last 3 Encounters:   12/04/18 (!) 148/92   11/20/18 118/68   06/29/18 116/62    Weight from  Last 3 Encounters:   12/04/18 127 lb (57.6 kg)   11/20/18 125 lb 3.2 oz (56.8 kg)   06/29/18 128 lb (58.1 kg)              Today, you had the following     No orders found for display       Primary Care Provider Office Phone # Fax #    Silvinonani Peace [phone]       [address]  [facility] [address]        Equal Access to Services     [facility]: Hadii aad ku hadasho Soomaali, waaxda luqadaha, qaybta kaalmada adeegyada, waxay idiin hayaan adeeg dejonalvarezmelissa dyer . So [facility] [phone].    ATENCIÓN: Si prola espcaty, tiene a flynn disposición servicios gratuitos de asistencia lingüística. [facility] [phone].    We comply with applicable federal civil rights laws and [place] laws. We do not discriminate on the basis of race, color, national origin, age, disability, sex, sexual orientation, or gender identity.            Thank you!     Thank you for choosing [facility] AND [facility]  for your care. Our goal is always to provide you with excellent care. Hearing back from our patients is one way we can continue to improve our services. Please take a few minutes to complete the written survey that you may receive in the mail after your visit with us. Thank you!             Your Updated Medication List - Protect others around you: Learn how to safely use, store and throw away your medicines at www.disposemymeds.org.          This list is accurate as of 12/4/18 11:31 AM.  Always use your most recent med list.                   Brand Name Dispense Instructions for use Diagnosis    * albuterol 108 (90 Base) MCG/ACT inhaler    PROAIR HFA    3 Inhaler    Inhale 2 puffs into the lungs 4 times daily as needed    Wheezing       * albuterol (2.5 MG/3ML) 0.083% neb solution    PROVENTIL    360 mL    Take 1 vial (2.5 mg) by nebulization 4 times daily    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)       alendronate 70 MG tablet    FOSAMAX     Take 1 tablet by mouth once a week in the  morning. Take on empty stomach with full glass of water. Do not lie down for 1 hr.        azithromycin 250 MG tablet    ZITHROMAX     Take 1 tablet by mouth daily        ethambutol 400 MG tablet    MYAMBUTOL     Take 3 tablets by mouth daily        fluticasone 50 MCG/ACT nasal spray    FLONASE     Spray 1-2 sprays into both nostrils daily To prevent allergy symptoms. Must be used regularly to be effective.        gabapentin 400 MG capsule    NEURONTIN    90 capsule    Take 1 capsule (400 mg) by mouth At Bedtime    Restless legs syndrome (RLS)       hydrOXYzine 10 MG tablet    ATARAX     Take 1-2 tablets by mouth 3 times daily as needed        lisinopril 5 MG tablet    PRINIVIL/ZESTRIL    90 tablet    TAKE ONE TABLET BY MOUTH EVERY DAY    Essential hypertension       rifampin 300 MG capsule    RIFADIN     Take 2 capsules by mouth daily        sodium chloride 3 % neb solution    NEBUSAL     INHALE 3ML AS DIRECTED        * traZODone 50 MG tablet    DESYREL     Take 1-3 tablets by mouth At Bedtime        * traZODone 150 MG tablet    DESYREL    90 tablet    Take 1 tablet (150 mg) by mouth At Bedtime    Primary insomnia       venlafaxine 150 MG 24 hr capsule    EFFEXOR-XR     Take 300 mg by mouth daily with food        witch hazel-glycerin pad    TUCKS     Apply 1 applicator topically        * Notice:  This list has 4 medication(s) that are the same as other medications prescribed for you. Read the directions carefully, and ask your doctor or other care provider to review them with you.

## 2018-12-04 NOTE — PROGRESS NOTES
Nursing Notes:   Estefanía Sahni LPN  12/4/2018 11:20 AM  Signed  Pt presents to clinic today for follow up left hand pain. Pt states improvement with the thumb spica.  Medication Reconciliation: complete    Estefanía Sahni LPN     SUBJECTIVE:  58 year old female presents for bilateral wrist pain for a month. She started working at [employer] a 2.5 months ago. As noted previously, was using hands a lot. Tingling in 1st and 3rd fingers on both hands, worse on the left.     Wearing a thumb spica on the left and a wrist brace on the right. Symptoms have improved. Still has tingling in the finger tips. Not working for the past 2 weeks due to lack of light duty.     She has not been treated for CTS before.     Current Outpatient Prescriptions   Medication Sig Dispense Refill     albuterol (2.5 MG/3ML) 0.083% neb solution Take 1 vial (2.5 mg) by nebulization 4 times daily 360 mL 3     albuterol (PROAIR HFA) 108 (90 Base) MCG/ACT inhaler Inhale 2 puffs into the lungs 4 times daily as needed 3 Inhaler 2     alendronate (FOSAMAX) 70 MG tablet Take 1 tablet by mouth once a week in the morning. Take on empty stomach with full glass of water. Do not lie down for 1 hr.       azithromycin (ZITHROMAX) 250 MG tablet Take 1 tablet by mouth daily  11     ethambutol (MYAMBUTOL) 400 MG tablet Take 3 tablets by mouth daily  11     fluticasone (FLONASE) 50 MCG/ACT spray Spray 1-2 sprays into both nostrils daily To prevent allergy symptoms. Must be used regularly to be effective.       gabapentin (NEURONTIN) 400 MG capsule Take 1 capsule (400 mg) by mouth At Bedtime 90 capsule 3     hydrOXYzine (ATARAX) 10 MG tablet Take 1-2 tablets by mouth 3 times daily as needed       lisinopril (PRINIVIL/ZESTRIL) 5 MG tablet TAKE ONE TABLET BY MOUTH EVERY DAY 90 tablet 0     rifampin (RIFADIN) 300 MG capsule Take 2 capsules by mouth daily  11     sodium chloride 3 % NEBU neb solution INHALE 3ML AS DIRECTED       traZODone (DESYREL) 150 MG tablet Take 1  tablet (150 mg) by mouth At Bedtime 90 tablet 3     traZODone (DESYREL) 50 MG tablet Take 1-3 tablets by mouth At Bedtime       venlafaxine (EFFEXOR-XR) 150 MG 24 hr capsule Take 300 mg by mouth daily with food       witch hazel-glycerin (TUCKS) pad Apply 1 applicator topically       Allergies   Allergen Reactions     Penicillins Anaphylaxis       OBJECTIVE:  BP (!) 148/92 (BP Location: Right arm, Patient Position: Chair, Cuff Size: Adult Regular)  Pulse 66  Resp 16  Wt 127 lb (57.6 kg)  BMI 21.8 kg/m2    EXAM:  General Appearance: Alert. No acute distress  Musculoskeletal: No muscle wasting in hands. Negative Finkelstein test on left. Nontender over radial wrist on left.  Neurological: Minimally positive Tinel sign both wrists. Strength in hands intact  Psychiatric: Normal affect and mentation        ASSESSMENT/PLAN:    ICD-10-CM    1. Bilateral carpal tunnel syndrome G56.03    2. De Quervain's disease (tenosynovitis) M65.4      Both conditions improved. She requests return to work. This is reasonable, but may have return of symptoms and need different work or work duties.     No symptoms before working. Overuse injury, so work related injury appears to be the cause. I would have attributed this to work comp injury before, but she was not certain and had not filed for work comp     F/U as needed    Silvino Peace MD    This document was prepared using a combination of typing and voice generated software.  While every attempt was made for accuracy, spelling and grammatical errors may exist.

## 2018-12-04 NOTE — NURSING NOTE
Pt presents to clinic today for follow up left hand pain. Pt states improvement with the thumb spica.  Medication Reconciliation: complete    Estefanía Sahni LPN

## 2018-12-05 ASSESSMENT — ANXIETY QUESTIONNAIRES: GAD7 TOTAL SCORE: 4

## 2018-12-31 ENCOUNTER — OFFICE VISIT (OUTPATIENT)
Dept: FAMILY MEDICINE | Facility: OTHER | Age: 58
End: 2018-12-31
Attending: FAMILY MEDICINE
Payer: OTHER MISCELLANEOUS

## 2018-12-31 VITALS
BODY MASS INDEX: 21.9 KG/M2 | WEIGHT: 127.6 LBS | RESPIRATION RATE: 18 BRPM | DIASTOLIC BLOOD PRESSURE: 81 MMHG | HEART RATE: 88 BPM | SYSTOLIC BLOOD PRESSURE: 130 MMHG

## 2018-12-31 DIAGNOSIS — M65.4 DE QUERVAIN'S DISEASE (TENOSYNOVITIS): ICD-10-CM

## 2018-12-31 DIAGNOSIS — G56.03 BILATERAL CARPAL TUNNEL SYNDROME: Primary | ICD-10-CM

## 2018-12-31 PROCEDURE — 99213 OFFICE O/P EST LOW 20 MIN: CPT | Performed by: FAMILY MEDICINE

## 2018-12-31 RX ORDER — PREDNISONE 10 MG/1
TABLET ORAL
Qty: 21 TABLET | Refills: 0 | Status: SHIPPED | OUTPATIENT
Start: 2018-12-31 | End: 2019-02-12

## 2018-12-31 ASSESSMENT — PAIN SCALES - GENERAL: PAINLEVEL: MODERATE PAIN (5)

## 2018-12-31 NOTE — PROGRESS NOTES
Nursing Notes:   Estefanía Sahni LPN  12/31/2018  4:05 PM  Signed  Pt presents to clinic today for work comp visit.      Medication Reconciliation: complete  Estefanía Sahni LPN     SUBJECTIVE:  58 year old female presents for bilateral wrist pain for a month. She started working at [employer] a 3.5 months ago. As noted previously, was using hands a lot. Tingling in 1st and 3rd fingers on both hands, worse on the left.     Given thumb spica, but she could not use it at work. Did use wrist braces on both sides at work, but her symptoms are worse. She is waking up at night with problems.     This injury was given work comp status since last appointment     Past Surgical History:   Procedure Laterality Date     BRONCHOSCOPY DIAGNOSTIC  08/07/2018    MAC infection     CLOSED REDUCTION ANKLE      12/26/07,Open reduction/internal fixation, right ankle fracture 12/26/07.     COLONOSCOPY      2007,screening for family history,  follow-up recommended in five years.     COLONOSCOPY      2007,Colonoscopy April 2007.  Follow-up recommended in five years.     COLONOSCOPY      2/2013,Normal, hemorrhoids - follow up 5 years for family history of colon cnacer     HYSTERECTOMY TOTAL ABDOMINAL      04/26/2007,Laparoscopic supracervical hysterectomy and bilateral salpingo-oophorectomy for bleeding fibroid     LAPAROSCOPIC BYPASS GASTRIC      2007     LAPAROSCOPIC TUBAL LIGATION      1985     LOBECTOMY LUNG  08/07/2018    VATS - LOBECTOMY UPPER LUNG, [facility], Dr Kohler     LUMPECTOMY BREAST      Lumpectomy of the right breast     SKIN GRAFT, EACH ADDN 100SQCM  03/08/2007    Skin grafting post right ankle fracture        Current Outpatient Medications   Medication Sig Dispense Refill     albuterol (2.5 MG/3ML) 0.083% neb solution Take 1 vial (2.5 mg) by nebulization 4 times daily 360 mL 3     albuterol (PROAIR HFA) 108 (90 Base) MCG/ACT inhaler Inhale 2 puffs into the lungs 4 times daily as needed 3 Inhaler 2     alendronate (FOSAMAX) 70 MG  tablet Take 1 tablet by mouth once a week in the morning. Take on empty stomach with full glass of water. Do not lie down for 1 hr.       ethambutol (MYAMBUTOL) 400 MG tablet Take 3 tablets by mouth daily  11     fluticasone (FLONASE) 50 MCG/ACT spray Spray 1-2 sprays into both nostrils daily To prevent allergy symptoms. Must be used regularly to be effective.       gabapentin (NEURONTIN) 400 MG capsule Take 1 capsule (400 mg) by mouth At Bedtime 90 capsule 3     hydrOXYzine (ATARAX) 10 MG tablet Take 1-2 tablets by mouth 3 times daily as needed       lisinopril (PRINIVIL/ZESTRIL) 5 MG tablet TAKE ONE TABLET BY MOUTH EVERY DAY 90 tablet 0     rifampin (RIFADIN) 300 MG capsule Take 2 capsules by mouth daily  11     sodium chloride 3 % NEBU neb solution INHALE 3ML AS DIRECTED       traZODone (DESYREL) 150 MG tablet Take 1 tablet (150 mg) by mouth At Bedtime 90 tablet 3     traZODone (DESYREL) 50 MG tablet Take 1-3 tablets by mouth At Bedtime       venlafaxine (EFFEXOR-XR) 150 MG 24 hr capsule Take 300 mg by mouth daily with food       witch hazel-glycerin (TUCKS) pad Apply 1 applicator topically       Allergies   Allergen Reactions     Penicillins Anaphylaxis       OBJECTIVE:  /81 (BP Location: Right arm, Patient Position: Chair, Cuff Size: Adult Regular)   Pulse 88   Resp 18   Wt 57.9 kg (127 lb 9.6 oz)   BMI 21.90 kg/m      EXAM:  General Appearance: Alert. No acute distress  Musculoskeletal: No muscle wasting in hands. Negative Finkelstein test on left. Neurological: Minimally positive Tinel sign both wrists. No muscle wasting. Strength in hands slightly weak.  Psychiatric: Normal affect and mentation        ASSESSMENT/PLAN:    ICD-10-CM    1. Bilateral carpal tunnel syndrome G56.03 NEUROLOGY ADULT REFERRAL     predniSONE (DELTASONE) 10 MG tablet   2. De Quervain's disease (tenosynovitis) M65.4      Symptoms worse with return to work. Is not able to tolerate repetitive motion of hands. Recommend continued  bracing and given work restrictions to avoid repetitive motion with either hand.     Referral placed for EMG.     She may benefit from prednisone. 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 It sounds like the antibiotics are just in case as a significant portion of infection removed in surgery. Therefore benefit of a short course of prednisone seems to outweigh risks. Given prednisone 20 mg daily x7 days, then 10 mg daily x 7 days, then stop. Continue other existing medications.      Follow up in a couple weeks.     Silvino Peace MD    This document was prepared using a combination of typing and voice generated software.  While every attempt was made for accuracy, spelling and grammatical errors may exist.

## 2018-12-31 NOTE — PATIENT INSTRUCTIONS
Prednisone 20 mg daily for 7 days, then 10 mg daily for 7 days  Continue braces  Referral for EMG  Off work until follow up   Follow-up in a couple weeks

## 2018-12-31 NOTE — NURSING NOTE
Pt presents to clinic today for work comp visit.      Medication Reconciliation: complete  Estefanía Sahni LPN

## 2019-01-03 ENCOUNTER — TELEPHONE (OUTPATIENT)
Dept: FAMILY MEDICINE | Facility: OTHER | Age: 59
End: 2019-01-03

## 2019-01-03 DIAGNOSIS — G25.81 RESTLESS LEGS SYNDROME (RLS): ICD-10-CM

## 2019-01-03 NOTE — TELEPHONE ENCOUNTER
Would like additional medication to take with gabapentin for restless legs. States that just the gabapentin isn't working.

## 2019-01-04 RX ORDER — GABAPENTIN 400 MG/1
800 CAPSULE ORAL AT BEDTIME
Qty: 180 CAPSULE | Refills: 3 | Status: SHIPPED | OUTPATIENT
Start: 2019-01-04 | End: 2020-01-07

## 2019-01-04 NOTE — TELEPHONE ENCOUNTER
After verifying pts name and date of birth with pt, pt would like to try doubling her gabapentin at this time.  Estefanía Sahni

## 2019-01-04 NOTE — TELEPHONE ENCOUNTER
She may need 2 gabapentin, has she tried 2? Typical  restless leg syndrome dose is 600 mg and she is only on 400 mg. If this sounds fine, I will send in new Rx for new dose.

## 2019-01-16 ENCOUNTER — OFFICE VISIT (OUTPATIENT)
Dept: FAMILY MEDICINE | Facility: OTHER | Age: 59
End: 2019-01-16
Attending: FAMILY MEDICINE
Payer: OTHER MISCELLANEOUS

## 2019-01-16 VITALS
HEART RATE: 70 BPM | SYSTOLIC BLOOD PRESSURE: 122 MMHG | BODY MASS INDEX: 22.14 KG/M2 | RESPIRATION RATE: 16 BRPM | WEIGHT: 129 LBS | DIASTOLIC BLOOD PRESSURE: 70 MMHG

## 2019-01-16 DIAGNOSIS — G56.03 BILATERAL CARPAL TUNNEL SYNDROME: Primary | ICD-10-CM

## 2019-01-16 DIAGNOSIS — F33.41 RECURRENT MAJOR DEPRESSION IN PARTIAL REMISSION (H): ICD-10-CM

## 2019-01-16 PROCEDURE — 99213 OFFICE O/P EST LOW 20 MIN: CPT | Performed by: FAMILY MEDICINE

## 2019-01-16 RX ORDER — VENLAFAXINE HYDROCHLORIDE 150 MG/1
300 CAPSULE, EXTENDED RELEASE ORAL
Qty: 180 CAPSULE | Refills: 0 | Status: SHIPPED | OUTPATIENT
Start: 2019-01-16 | End: 2019-07-22

## 2019-01-16 ASSESSMENT — PAIN SCALES - GENERAL: PAINLEVEL: MODERATE PAIN (4)

## 2019-01-16 NOTE — PROGRESS NOTES
Nursing Notes:   Estefanía Sahni LPN  1/16/2019 11:48 AM  Sign at exiting of workspace  Pt presents to clinic today for follow up work comp on left wrist.      Medication Reconciliation: complete  Estefanía Sahni LPN     SUBJECTIVE:  58 year old female presents for bilateral wrist pain for a month. She started working at [employer] a 4 months ago. As noted previously, was using hands a lot. Tingling in 1st and 3rd fingers on both hands, worse on the left.     Taking time off work helped symptoms, then worse when she tried to work again. Last appointment 2 weeks ago was restricted to not use hands and so has been off work. Given prednisone as well to see if this helped.    Prednisone helped with less pain and tingling. Right is not too problematic any longer. Left remains symptomatic consistently.    EMG scheduled Jan 29 in [place]       Current Outpatient Medications   Medication Sig Dispense Refill     albuterol (2.5 MG/3ML) 0.083% neb solution Take 1 vial (2.5 mg) by nebulization 4 times daily 360 mL 3     albuterol (PROAIR HFA) 108 (90 Base) MCG/ACT inhaler Inhale 2 puffs into the lungs 4 times daily as needed 3 Inhaler 2     alendronate (FOSAMAX) 70 MG tablet Take 1 tablet by mouth once a week in the morning. Take on empty stomach with full glass of water. Do not lie down for 1 hr.       ethambutol (MYAMBUTOL) 400 MG tablet Take 3 tablets by mouth daily  11     fluticasone (FLONASE) 50 MCG/ACT spray Spray 1-2 sprays into both nostrils daily To prevent allergy symptoms. Must be used regularly to be effective.       gabapentin (NEURONTIN) 400 MG capsule Take 2 capsules (800 mg) by mouth At Bedtime 180 capsule 3     hydrOXYzine (ATARAX) 10 MG tablet Take 1-2 tablets by mouth 3 times daily as needed       lisinopril (PRINIVIL/ZESTRIL) 5 MG tablet TAKE ONE TABLET BY MOUTH EVERY DAY 90 tablet 0     rifampin (RIFADIN) 300 MG capsule Take 2 capsules by mouth daily  11     sodium chloride 3 % NEBU neb solution INHALE 3ML AS  DIRECTED       traZODone (DESYREL) 150 MG tablet Take 1 tablet (150 mg) by mouth At Bedtime 90 tablet 3     traZODone (DESYREL) 50 MG tablet Take 1-3 tablets by mouth At Bedtime       venlafaxine (EFFEXOR-XR) 150 MG 24 hr capsule Take 300 mg by mouth daily with food       witch hazel-glycerin (TUCKS) pad Apply 1 applicator topically       Allergies   Allergen Reactions     Penicillins Anaphylaxis       OBJECTIVE:  /70 (BP Location: Right arm, Patient Position: Chair, Cuff Size: Adult Regular)   Pulse 70   Resp 16   Wt 58.5 kg (129 lb)   BMI 22.14 kg/m      EXAM:  General Appearance: Alert. No acute distress  Musculoskeletal: No muscle wasting in hands. Negative Finkelstein test on left. Neurological: Minimally positive Tinel sign both wrists. No muscle wasting. Strength in hands slightly weak.  Psychiatric: Normal affect and mentation        ASSESSMENT/PLAN:    ICD-10-CM    1. Bilateral carpal tunnel syndrome G56.03      Symptoms improved. Exam stable. If she returns to work, expect worsening. Continue work restrictions with no use of hands. Await EMG findings. Follow-up after EMG - in a few weeks    Silvino Peace MD    This document was prepared using a combination of typing and voice generated software.  While every attempt was made for accuracy, spelling and grammatical errors may exist.

## 2019-01-16 NOTE — NURSING NOTE
Pt presents to clinic today for follow up work comp on left wrist.      Medication Reconciliation: complete  Estefanía Sahni LPN

## 2019-02-06 ENCOUNTER — OFFICE VISIT (OUTPATIENT)
Dept: FAMILY MEDICINE | Facility: OTHER | Age: 59
End: 2019-02-06
Attending: FAMILY MEDICINE
Payer: OTHER MISCELLANEOUS

## 2019-02-06 VITALS
RESPIRATION RATE: 18 BRPM | HEART RATE: 78 BPM | WEIGHT: 124.2 LBS | SYSTOLIC BLOOD PRESSURE: 152 MMHG | DIASTOLIC BLOOD PRESSURE: 88 MMHG | TEMPERATURE: 99.4 F | BODY MASS INDEX: 21.32 KG/M2

## 2019-02-06 DIAGNOSIS — G56.03 BILATERAL CARPAL TUNNEL SYNDROME: Primary | ICD-10-CM

## 2019-02-06 PROCEDURE — 99213 OFFICE O/P EST LOW 20 MIN: CPT | Performed by: FAMILY MEDICINE

## 2019-02-06 ASSESSMENT — ANXIETY QUESTIONNAIRES
5. BEING SO RESTLESS THAT IT IS HARD TO SIT STILL: NOT AT ALL
1. FEELING NERVOUS, ANXIOUS, OR ON EDGE: NOT AT ALL
IF YOU CHECKED OFF ANY PROBLEMS ON THIS QUESTIONNAIRE, HOW DIFFICULT HAVE THESE PROBLEMS MADE IT FOR YOU TO DO YOUR WORK, TAKE CARE OF THINGS AT HOME, OR GET ALONG WITH OTHER PEOPLE: NOT DIFFICULT AT ALL
3. WORRYING TOO MUCH ABOUT DIFFERENT THINGS: NOT AT ALL
2. NOT BEING ABLE TO STOP OR CONTROL WORRYING: NOT AT ALL
GAD7 TOTAL SCORE: 0
6. BECOMING EASILY ANNOYED OR IRRITABLE: NOT AT ALL
7. FEELING AFRAID AS IF SOMETHING AWFUL MIGHT HAPPEN: NOT AT ALL

## 2019-02-06 ASSESSMENT — PATIENT HEALTH QUESTIONNAIRE - PHQ9
SUM OF ALL RESPONSES TO PHQ QUESTIONS 1-9: 2
5. POOR APPETITE OR OVEREATING: NOT AT ALL

## 2019-02-06 ASSESSMENT — PAIN SCALES - GENERAL: PAINLEVEL: NO PAIN (0)

## 2019-02-06 NOTE — PROGRESS NOTES
Nursing Notes:   Estefanía Sahni, LPN  2/6/2019 11:20 AM  Signed  Pt presents to clinic today for work comp carpal tunnel.      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 Seen initially for this on 11/20/18.    She had an EMG with Dr Xiao in [place] on Jan 29. Had bilateral carpal tunnel findings and he recommended surgery. No report available yet.     Has been off work as she tried to return wearing braces and became worse. Prednisone helped slightly and braces have not really helped.       Current Outpatient Medications   Medication Sig Dispense Refill     albuterol (2.5 MG/3ML) 0.083% neb solution Take 1 vial (2.5 mg) by nebulization 4 times daily 360 mL 3     albuterol (PROAIR HFA) 108 (90 Base) MCG/ACT inhaler Inhale 2 puffs into the lungs 4 times daily as needed 3 Inhaler 2     alendronate (FOSAMAX) 70 MG tablet Take 1 tablet by mouth once a week in the morning. Take on empty stomach with full glass of water. Do not lie down for 1 hr.       ethambutol (MYAMBUTOL) 400 MG tablet Take 3 tablets by mouth daily  11     fluticasone (FLONASE) 50 MCG/ACT spray Spray 1-2 sprays into both nostrils daily To prevent allergy symptoms. Must be used regularly to be effective.       gabapentin (NEURONTIN) 400 MG capsule Take 2 capsules (800 mg) by mouth At Bedtime 180 capsule 3     hydrOXYzine (ATARAX) 10 MG tablet Take 1-2 tablets by mouth 3 times daily as needed       lisinopril (PRINIVIL/ZESTRIL) 5 MG tablet TAKE ONE TABLET BY MOUTH EVERY DAY 90 tablet 0     rifampin (RIFADIN) 300 MG capsule Take 2 capsules by mouth daily  11     sodium chloride 3 % NEBU neb solution INHALE 3ML AS DIRECTED       traZODone (DESYREL) 150 MG tablet Take 1 tablet (150 mg) by mouth At Bedtime 90 tablet 3     venlafaxine (EFFEXOR-XR) 150 MG 24 hr capsule Take 2 capsules  (300 mg) by mouth daily with food 180 capsule 0     witch hazel-glycerin (TUCKS) pad Apply 1 applicator topically       No Known Allergies    OBJECTIVE:  /88 (BP Location: Right arm, Patient Position: Chair, Cuff Size: Adult Regular)   Pulse 78   Temp 99.4  F (37.4  C) (Tympanic)   Resp 18   Wt 56.3 kg (124 lb 3.2 oz)   BMI 21.32 kg/m      EXAM:  General Appearance: Alert. No acute distress  Neurological: Minimally positive Tinel sign both wrists. No muscle wasting. Strength in hands slightly weak.  Psychiatric: Normal affect and mentation        ASSESSMENT/PLAN:    ICD-10-CM    1. Bilateral carpal tunnel syndrome G56.03 GENERAL SURG ADULT REFERRAL     Completed new work comp form. Remain off work. Referral for surgery. Continue braces. Need to get EMG report.    She reports stability in other health issues, should be suitable for surgery. Blood pressure up today, but normal at most previous visits.    Silvino Peace MD    This document was prepared using a combination of typing and voice generated software.  While every attempt was made for accuracy, spelling and grammatical errors may exist.

## 2019-02-06 NOTE — NURSING NOTE
Pt presents to clinic today for work comp carpal tunnel.      Medication Reconciliation: complete  Estefanía Sahni LPN

## 2019-02-08 ASSESSMENT — ANXIETY QUESTIONNAIRES: GAD7 TOTAL SCORE: 0

## 2019-02-12 ENCOUNTER — OFFICE VISIT (OUTPATIENT)
Dept: SURGERY | Facility: OTHER | Age: 59
End: 2019-02-12
Attending: FAMILY MEDICINE
Payer: OTHER MISCELLANEOUS

## 2019-02-12 VITALS
DIASTOLIC BLOOD PRESSURE: 88 MMHG | HEART RATE: 84 BPM | SYSTOLIC BLOOD PRESSURE: 124 MMHG | TEMPERATURE: 98.9 F | HEIGHT: 64 IN | WEIGHT: 127 LBS | BODY MASS INDEX: 21.68 KG/M2

## 2019-02-12 DIAGNOSIS — G56.03 BILATERAL CARPAL TUNNEL SYNDROME: ICD-10-CM

## 2019-02-12 PROCEDURE — 99203 OFFICE O/P NEW LOW 30 MIN: CPT | Performed by: SURGERY

## 2019-02-12 RX ORDER — CELECOXIB 100 MG/1
100 CAPSULE ORAL 2 TIMES DAILY
Qty: 180 CAPSULE | Refills: 3 | Status: SHIPPED | OUTPATIENT
Start: 2019-02-12 | End: 2019-02-14

## 2019-02-12 ASSESSMENT — MIFFLIN-ST. JEOR: SCORE: 1146.07

## 2019-02-12 ASSESSMENT — PAIN SCALES - GENERAL: PAINLEVEL: NO PAIN (0)

## 2019-02-12 NOTE — NURSING NOTE
"Chief Complaint   Patient presents with     Consult     Carpal tunnel syndrome     Patient presents to clinic with work comp carpal tunnel syndrome,  She works at [employer].    Initial /88 (BP Location: Right arm, Patient Position: Sitting, Cuff Size: Adult Large)   Pulse 84   Temp 98.9  F (37.2  C) (Temporal)   Ht 1.626 m (5' 4\")   Wt 57.6 kg (127 lb)   BMI 21.80 kg/m   Estimated body mass index is 21.8 kg/m  as calculated from the following:    Height as of this encounter: 1.626 m (5' 4\").    Weight as of this encounter: 57.6 kg (127 lb).  Medication Reconciliation: complete    Blaire Robledo LPN  "

## 2019-02-12 NOTE — PROGRESS NOTES
GENERAL SURGERY CONSULTATION NOTE    Lisy Telles   [address]  [facility] [id number]  58 year old  female  Admission Date/Time: No admission date for patient encounter.  Primary Care Provider:  Silvino Peace was asked to see this patient by Dr. Peace for evaluation of carpal tunnel syndrome.     HPI: Lisy Telles is a 58 year old female who began having wrist pain after starting work at [employer].  Pt notes pain and swelling in the wrists and hands. This would wake her at night. She tried bracing and sort courses of naproxen. She does note GI upset with Aleve. Notes tired legs with ambulation. No claudication. Smokes a few cigarettes. Takes gabapentin for RLS.     REVIEW OF SYSTEMS:    GENERAL: No fevers or chills. Denies fatigue, recent weight loss.  HEENT: No sinus drainage. No changes with vision or hearing. No difficulty swallowing.   LYMPHATICS:  No swollen nodes in axilla, neck or groin.  CARDIOVASCULAR: Denies chest pain, palpitations and dyspnea on exertion.  PULMONARY: No shortness of breath or cough. No increase in sputum production. Hx of asthma  GI: Denies melena, bright red blood in stools. No hematemesis. No constipation or diarrhea.  : No dysuria or hematuria.  SKIN: No recent rashes or ulcers.   HEMATOLOGY:  No history of easy bruising or bleeding.  ENDOCRINE:  No history of diabetes or thyroid problems. ++ cold intolerance.   NEUROLOGY:  No history of seizures or headaches. No motor or sensory changes.        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)     Hypertension     Liver laceration, grade IV, with open wound into cavity     Lung nodule < 6cm on CT     Pneumothorax, closed, traumatic     Spleen laceration     Tobacco abuse     Traumatic brain injury with loss of consciousness of 30 minutes or less (H)     Restless legs syndrome (RLS)     Bilateral carpal tunnel syndrome     De Quervain's disease (tenosynovitis)       Past Medical History:   Diagnosis Date     Age-related osteoporosis with current pathological fracture with routine healing     2017     Anxiety disorder     No Comments Provided     Appendicitis     No Comments Provided     Benign neoplasm of connective and other soft tissue, unspecified     Right Breast     Brachial plexus disorders     2016     Chronic obstructive pulmonary disease (H)     No Comments Provided     Essential (primary) hypertension     No Comments Provided     Major depressive disorder, single episode     Depression with insomnia and tiredness     Other displaced fracture of upper end of left humerus, subsequent encounter for fracture with routine healing     2016     Other fracture of unspecified lower leg, initial encounter for closed fracture     Fracture, right ankle     Personal history of other medical treatment (CODE)      4, Para 4-0-0-4     Pure hypercholesterolemia     No Comments Provided     Rectal polyp     Hyperplastic polyp times three noted on colonoscopy 2007     Tobacco use     No Comments Provided     Uncomplicated alcohol dependence (H)     No Comments Provided       Past Surgical History:   Procedure Laterality Date     BRONCHOSCOPY DIAGNOSTIC  2018    MAC infection     CLOSED REDUCTION ANKLE      07,Open reduction/internal fixation, right ankle fracture 07.     COLONOSCOPY      ,screening for family history,  follow-up recommended in five years.     COLONOSCOPY      ,Colonoscopy  2007.  Follow-up recommended in five years.     COLONOSCOPY  2013,Normal, hemorrhoids - follow up 5 years for family history of colon cnacer     HYSTERECTOMY TOTAL ABDOMINAL      2007,Laparoscopic supracervical hysterectomy and bilateral salpingo-oophorectomy for bleeding fibroid     LAPAROSCOPIC BYPASS GASTRIC           LAPAROSCOPIC TUBAL LIGATION      1985     LOBECTOMY LUNG  2018    VATS - LOBECTOMY UPPER LUNG, [facility], Dr Kohler     LUMPECTOMY BREAST      Lumpectomy of the right breast     SKIN GRAFT, EACH ADDN 100SQCM  2007    Skin grafting post right ankle fracture       Family History   Problem Relation Age of Onset     Heart Disease Father 36        Heart Disease,MI  - Quadruple bypass     Hypertension Father         Hypertension     Other - See Comments Father         Obesity     Colon Cancer Mother 40        Cancer-colon,surgical resection--remission     Hypertension Mother         Hypertension     Other - See Comments Mother         Obesity     Colon Cancer Maternal Grandmother         Cancer-colon     Thyroid Disease Sister         Thyroid Disease     Breast Cancer Paternal Aunt 60        Cancer-breast,       Social History     Social History Narrative    . Drinks 6 beers daily.       Social History     Socioeconomic History     Marital status:      Spouse name: noris     Number of children: Not on file     Years of education: 11 GED     Highest education level: Not on file   Social Needs     Financial resource strain: Not on file     Food insecurity - worry: Not on file     Food insecurity - inability: Not on file     Transportation needs - medical: Not on file     Transportation needs - non-medical: Not on file   Occupational History     Not on file   Tobacco Use     Smoking status: Light Tobacco Smoker     Packs/day: 0.00     Years: 43.00     Pack years: 0.00     Types: Cigarettes     Smokeless tobacco: Never Used   Substance and Sexual  Activity     Alcohol use: No     Alcohol/week: 0.0 oz     Comment: Alcoholic Drinks/day: 4-5 beers and 1/2 pt vodka daily     Drug use: No     Comment: Drug use: No     Sexual activity: Yes     Partners: Male     Birth control/protection: Surgical   Other Topics Concern     Parent/sibling w/ CABG, MI or angioplasty before 65F 55M? Not Asked   Social History Narrative    . Drinks 6 beers daily.         Current Outpatient Medications on File Prior to Visit:  albuterol (2.5 MG/3ML) 0.083% neb solution Take 1 vial (2.5 mg) by nebulization 4 times daily   albuterol (PROAIR HFA) 108 (90 Base) MCG/ACT inhaler Inhale 2 puffs into the lungs 4 times daily as needed   alendronate (FOSAMAX) 70 MG tablet Take 1 tablet by mouth once a week in the morning. Take on empty stomach with full glass of water. Do not lie down for 1 hr.   ethambutol (MYAMBUTOL) 400 MG tablet Take 3 tablets by mouth daily   fluticasone (FLONASE) 50 MCG/ACT spray Spray 1-2 sprays into both nostrils daily To prevent allergy symptoms. Must be used regularly to be effective.   gabapentin (NEURONTIN) 400 MG capsule Take 2 capsules (800 mg) by mouth At Bedtime   hydrOXYzine (ATARAX) 10 MG tablet Take 1-2 tablets by mouth 3 times daily as needed   lisinopril (PRINIVIL/ZESTRIL) 5 MG tablet TAKE ONE TABLET BY MOUTH EVERY DAY   rifampin (RIFADIN) 300 MG capsule Take 2 capsules by mouth daily   sodium chloride 3 % NEBU neb solution INHALE 3ML AS DIRECTED   traZODone (DESYREL) 150 MG tablet Take 1 tablet (150 mg) by mouth At Bedtime   venlafaxine (EFFEXOR-XR) 150 MG 24 hr capsule Take 2 capsules (300 mg) by mouth daily with food   witch hazel-glycerin (TUCKS) pad Apply 1 applicator topically     No current facility-administered medications on file prior to visit.       ALLERGIES/SENSITIVITIES: No Known Allergies    PHYSICAL EXAM:     /88 (BP Location: Right arm, Patient Position: Sitting, Cuff Size: Adult Large)   Pulse 84   Temp 98.9  F (37.2  C)  "(Temporal)   Ht 1.626 m (5' 4\")   Wt 57.6 kg (127 lb)   BMI 21.80 kg/m      General Appearance:  Appears well. Poor dentition.   Heart & CV:  RRR no murmur.  Intact distal pulses, good cap refill.  LUNGS:  CTA B/L, no wheezing or crackles.  Abd:  Non-distended  Ext: Left hand shows thenar wasting. Ulnar strength and sensation intact.       ADDITIONAL COMMENTS:      CONSULTATION ASSESSMENT AND PLAN:    58 year old female with bilateral carpal tunnel syndrome. Left greater than right.     - Will proceed with left carpal tunnel release first.     - Discussed risk of bleeding, infection and injury to adjacent structures including nerve branches.     Andrew Sahni MD on 2/12/2019 at 1:36 PM      "

## 2019-02-12 NOTE — H&P (VIEW-ONLY)
GENERAL SURGERY CONSULTATION NOTE    Lisy Telles   [address]  [facility] [address]  58 year old  female  Admission Date/Time: No admission date for patient encounter.  Primary Care Provider:  Silvino Peace was asked to see this patient by Dr. Peace for evaluation of carpal tunnel syndrome.     HPI: Lisy Telles is a 58 year old female who began having wrist pain after starting work at [employer].  Pt notes pain and swelling in the wrists and hands. This would wake her at night. She tried bracing and sort courses of naproxen. She does note GI upset with Aleve. Notes tired legs with ambulation. No claudication. Smokes a few cigarettes. Takes gabapentin for RLS.     REVIEW OF SYSTEMS:    GENERAL: No fevers or chills. Denies fatigue, recent weight loss.  HEENT: No sinus drainage. No changes with vision or hearing. No difficulty swallowing.   LYMPHATICS:  No swollen nodes in axilla, neck or groin.  CARDIOVASCULAR: Denies chest pain, palpitations and dyspnea on exertion.  PULMONARY: No shortness of breath or cough. No increase in sputum production. Hx of asthma  GI: Denies melena, bright red blood in stools. No hematemesis. No constipation or diarrhea.  : No dysuria or hematuria.  SKIN: No recent rashes or ulcers.   HEMATOLOGY:  No history of easy bruising or bleeding.  ENDOCRINE:  No history of diabetes or thyroid problems. ++ cold intolerance.   NEUROLOGY:  No history of seizures or headaches. No motor or sensory changes.        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)     Hypertension     Liver laceration, grade IV, with open wound into cavity     Lung nodule < 6cm on CT     Pneumothorax, closed, traumatic     Spleen laceration     Tobacco abuse     Traumatic brain injury with loss of consciousness of 30 minutes or less (H)     Restless legs syndrome (RLS)     Bilateral carpal tunnel syndrome     De Quervain's disease (tenosynovitis)       Past Medical History:   Diagnosis Date     Age-related osteoporosis with current pathological fracture with routine healing     2017     Anxiety disorder     No Comments Provided     Appendicitis     No Comments Provided     Benign neoplasm of connective and other soft tissue, unspecified     Right Breast     Brachial plexus disorders     2016     Chronic obstructive pulmonary disease (H)     No Comments Provided     Essential (primary) hypertension     No Comments Provided     Major depressive disorder, single episode     Depression with insomnia and tiredness     Other displaced fracture of upper end of left humerus, subsequent encounter for fracture with routine healing     2016     Other fracture of unspecified lower leg, initial encounter for closed fracture     Fracture, right ankle     Personal history of other medical treatment (CODE)      4, Para 4-0-0-4     Pure hypercholesterolemia     No Comments Provided     Rectal polyp     Hyperplastic polyp times three noted on colonoscopy 2007     Tobacco use     No Comments Provided     Uncomplicated alcohol dependence (H)     No Comments Provided       Past Surgical History:   Procedure Laterality Date     BRONCHOSCOPY DIAGNOSTIC  2018    MAC infection     CLOSED REDUCTION ANKLE      07,Open reduction/internal fixation, right ankle fracture 07.     COLONOSCOPY      ,screening for family history,  follow-up recommended in five years.     COLONOSCOPY      ,Colonoscopy  2007.  Follow-up recommended in five years.     COLONOSCOPY  2013,Normal, hemorrhoids - follow up 5 years for family history of colon cnacer     HYSTERECTOMY TOTAL ABDOMINAL      2007,Laparoscopic supracervical hysterectomy and bilateral salpingo-oophorectomy for bleeding fibroid     LAPAROSCOPIC BYPASS GASTRIC           LAPAROSCOPIC TUBAL LIGATION      1985     LOBECTOMY LUNG  2018    VATS - LOBECTOMY UPPER LUNG, Maciel, Dr Kohler     LUMPECTOMY BREAST      Lumpectomy of the right breast     SKIN GRAFT, EACH ADDN 100SQCM  2007    Skin grafting post right ankle fracture       Family History   Problem Relation Age of Onset     Heart Disease Father 36        Heart Disease,MI  - Quadruple bypass     Hypertension Father         Hypertension     Other - See Comments Father         Obesity     Colon Cancer Mother 40        Cancer-colon,surgical resection--remission     Hypertension Mother         Hypertension     Other - See Comments Mother         Obesity     Colon Cancer Maternal Grandmother         Cancer-colon     Thyroid Disease Sister         Thyroid Disease     Breast Cancer Paternal Aunt 60        Cancer-breast,       Social History     Social History Narrative    . Drinks 6 beers daily.       Social History     Socioeconomic History     Marital status:      Spouse name: noris     Number of children: Not on file     Years of education: 11 GED     Highest education level: Not on file   Social Needs     Financial resource strain: Not on file     Food insecurity - worry: Not on file     Food insecurity - inability: Not on file     Transportation needs - medical: Not on file     Transportation needs - non-medical: Not on file   Occupational History     Not on file   Tobacco Use     Smoking status: Light Tobacco Smoker     Packs/day: 0.00     Years: 43.00     Pack years: 0.00     Types: Cigarettes     Smokeless tobacco: Never Used   Substance and Sexual  Activity     Alcohol use: No     Alcohol/week: 0.0 oz     Comment: Alcoholic Drinks/day: 4-5 beers and 1/2 pt vodka daily     Drug use: No     Comment: Drug use: No     Sexual activity: Yes     Partners: Male     Birth control/protection: Surgical   Other Topics Concern     Parent/sibling w/ CABG, MI or angioplasty before 65F 55M? Not Asked   Social History Narrative    . Drinks 6 beers daily.         Current Outpatient Medications on File Prior to Visit:  albuterol (2.5 MG/3ML) 0.083% neb solution Take 1 vial (2.5 mg) by nebulization 4 times daily   albuterol (PROAIR HFA) 108 (90 Base) MCG/ACT inhaler Inhale 2 puffs into the lungs 4 times daily as needed   alendronate (FOSAMAX) 70 MG tablet Take 1 tablet by mouth once a week in the morning. Take on empty stomach with full glass of water. Do not lie down for 1 hr.   ethambutol (MYAMBUTOL) 400 MG tablet Take 3 tablets by mouth daily   fluticasone (FLONASE) 50 MCG/ACT spray Spray 1-2 sprays into both nostrils daily To prevent allergy symptoms. Must be used regularly to be effective.   gabapentin (NEURONTIN) 400 MG capsule Take 2 capsules (800 mg) by mouth At Bedtime   hydrOXYzine (ATARAX) 10 MG tablet Take 1-2 tablets by mouth 3 times daily as needed   lisinopril (PRINIVIL/ZESTRIL) 5 MG tablet TAKE ONE TABLET BY MOUTH EVERY DAY   rifampin (RIFADIN) 300 MG capsule Take 2 capsules by mouth daily   sodium chloride 3 % NEBU neb solution INHALE 3ML AS DIRECTED   traZODone (DESYREL) 150 MG tablet Take 1 tablet (150 mg) by mouth At Bedtime   venlafaxine (EFFEXOR-XR) 150 MG 24 hr capsule Take 2 capsules (300 mg) by mouth daily with food   witch hazel-glycerin (TUCKS) pad Apply 1 applicator topically     No current facility-administered medications on file prior to visit.       ALLERGIES/SENSITIVITIES: No Known Allergies    PHYSICAL EXAM:     /88 (BP Location: Right arm, Patient Position: Sitting, Cuff Size: Adult Large)   Pulse 84   Temp 98.9  F (37.2  C)  "(Temporal)   Ht 1.626 m (5' 4\")   Wt 57.6 kg (127 lb)   BMI 21.80 kg/m      General Appearance:  Appears well. Poor dentition.   Heart & CV:  RRR no murmur.  Intact distal pulses, good cap refill.  LUNGS:  CTA B/L, no wheezing or crackles.  Abd:  Non-distended  Ext: Left hand shows thenar wasting. Ulnar strength and sensation intact.       ADDITIONAL COMMENTS:      CONSULTATION ASSESSMENT AND PLAN:    58 year old female with bilateral carpal tunnel syndrome. Left greater than right.     - Will proceed with left carpal tunnel release first.     - Discussed risk of bleeding, infection and injury to adjacent structures including nerve branches.     Andrew Sahni MD on 2/12/2019 at 1:36 PM      "

## 2019-02-19 ENCOUNTER — TELEPHONE (OUTPATIENT)
Dept: FAMILY MEDICINE | Facility: OTHER | Age: 59
End: 2019-02-19

## 2019-02-19 ENCOUNTER — TELEPHONE (OUTPATIENT)
Dept: SURGERY | Facility: OTHER | Age: 59
End: 2019-02-19

## 2019-02-19 NOTE — TELEPHONE ENCOUNTER
[facility] called, patient stating that she did not receive a wrist brace at the 11/20/2018 office visit with [facility]. [facility] states she has a sticker on the paperwork. Please call Tracy back with confirmation whether the patient received a brace or not.

## 2019-02-19 NOTE — TELEPHONE ENCOUNTER
Per office note from 11/20 I clarified the thumb spica that was given at the visit that day. No further questions at this time.

## 2019-02-19 NOTE — TELEPHONE ENCOUNTER
I received a fax from [facility] wanting us to get a PA on Celecoxib 100 MG capsules with Dr. Andrew Sahni listed as the prescriber but I don't even show this medication in this patients chart/med list.  Is this something that you want this patient on?  If so, can you please prescribe it and let me know either way so that I know how to proceed.  Nohemy Pate on 2/19/2019 at 2:28 PM

## 2019-02-20 ENCOUNTER — ANESTHESIA EVENT (OUTPATIENT)
Dept: SURGERY | Facility: OTHER | Age: 59
End: 2019-02-20
Payer: OTHER MISCELLANEOUS

## 2019-02-20 DIAGNOSIS — G56.03 BILATERAL CARPAL TUNNEL SYNDROME: Primary | ICD-10-CM

## 2019-02-20 RX ORDER — CELECOXIB 50 MG/1
50 CAPSULE ORAL 2 TIMES DAILY
Qty: 180 CAPSULE | Refills: 3 | Status: SHIPPED | OUTPATIENT
Start: 2019-02-20 | End: 2020-01-16

## 2019-02-20 NOTE — TELEPHONE ENCOUNTER
This is through  so the pharmacy has to get the PA's on the WC RX's and I faxed them and told them that on 2/20/19.  Nohemy Pate on 2/20/2019 at 10:09 AM

## 2019-02-21 ENCOUNTER — HOSPITAL ENCOUNTER (OUTPATIENT)
Facility: OTHER | Age: 59
Discharge: HOME | End: 2019-02-21
Attending: SURGERY | Admitting: SURGERY
Payer: OTHER MISCELLANEOUS

## 2019-02-21 ENCOUNTER — ANESTHESIA (OUTPATIENT)
Dept: SURGERY | Facility: OTHER | Age: 59
End: 2019-02-21
Payer: OTHER MISCELLANEOUS

## 2019-02-21 VITALS
WEIGHT: 124.2 LBS | HEIGHT: 64 IN | RESPIRATION RATE: 16 BRPM | DIASTOLIC BLOOD PRESSURE: 65 MMHG | OXYGEN SATURATION: 96 % | TEMPERATURE: 97.6 F | SYSTOLIC BLOOD PRESSURE: 107 MMHG | BODY MASS INDEX: 21.21 KG/M2 | HEART RATE: 77 BPM

## 2019-02-21 DIAGNOSIS — G56.03 BILATERAL CARPAL TUNNEL SYNDROME: Primary | ICD-10-CM

## 2019-02-21 PROCEDURE — 25000125 ZZHC RX 250: Performed by: NURSE ANESTHETIST, CERTIFIED REGISTERED

## 2019-02-21 PROCEDURE — 27210794 ZZH OR GENERAL SUPPLY STERILE: Performed by: SURGERY

## 2019-02-21 PROCEDURE — 25000128 H RX IP 250 OP 636: Performed by: NURSE ANESTHETIST, CERTIFIED REGISTERED

## 2019-02-21 PROCEDURE — 25000132 ZZH RX MED GY IP 250 OP 250 PS 637: Performed by: SURGERY

## 2019-02-21 PROCEDURE — 64721 CARPAL TUNNEL SURGERY: CPT | Performed by: NURSE ANESTHETIST, CERTIFIED REGISTERED

## 2019-02-21 PROCEDURE — 64721 CARPAL TUNNEL SURGERY: CPT | Performed by: SURGERY

## 2019-02-21 PROCEDURE — 25000128 H RX IP 250 OP 636: Performed by: SURGERY

## 2019-02-21 PROCEDURE — 36000052 ZZH SURGERY LEVEL 2 EA 15 ADDTL MIN: Performed by: SURGERY

## 2019-02-21 PROCEDURE — 71000027 ZZH RECOVERY PHASE 2 EACH 15 MINS: Performed by: SURGERY

## 2019-02-21 PROCEDURE — 25800030 ZZH RX IP 258 OP 636: Performed by: NURSE ANESTHETIST, CERTIFIED REGISTERED

## 2019-02-21 PROCEDURE — 36000050 ZZH SURGERY LEVEL 2 1ST 30 MIN: Performed by: SURGERY

## 2019-02-21 PROCEDURE — 25000125 ZZHC RX 250: Performed by: SURGERY

## 2019-02-21 PROCEDURE — 37000008 ZZH ANESTHESIA TECHNICAL FEE, 1ST 30 MIN: Performed by: SURGERY

## 2019-02-21 PROCEDURE — 37000009 ZZH ANESTHESIA TECHNICAL FEE, EACH ADDTL 15 MIN: Performed by: SURGERY

## 2019-02-21 PROCEDURE — 40000306 ZZH STATISTIC PRE PROC ASSESS II: Performed by: SURGERY

## 2019-02-21 RX ORDER — PROPOFOL 10 MG/ML
INJECTION, EMULSION INTRAVENOUS PRN
Status: DISCONTINUED | OUTPATIENT
Start: 2019-02-21 | End: 2019-02-21

## 2019-02-21 RX ORDER — LIDOCAINE 40 MG/G
CREAM TOPICAL
Status: DISCONTINUED | OUTPATIENT
Start: 2019-02-21 | End: 2019-02-21 | Stop reason: HOSPADM

## 2019-02-21 RX ORDER — SODIUM CHLORIDE, SODIUM LACTATE, POTASSIUM CHLORIDE, CALCIUM CHLORIDE 600; 310; 30; 20 MG/100ML; MG/100ML; MG/100ML; MG/100ML
INJECTION, SOLUTION INTRAVENOUS CONTINUOUS
Status: DISCONTINUED | OUTPATIENT
Start: 2019-02-21 | End: 2019-02-21 | Stop reason: HOSPADM

## 2019-02-21 RX ORDER — FENTANYL CITRATE 50 UG/ML
25-50 INJECTION, SOLUTION INTRAMUSCULAR; INTRAVENOUS
Status: DISCONTINUED | OUTPATIENT
Start: 2019-02-21 | End: 2019-02-21 | Stop reason: HOSPADM

## 2019-02-21 RX ORDER — CEFAZOLIN SODIUM 2 G/100ML
2 INJECTION, SOLUTION INTRAVENOUS
Status: COMPLETED | OUTPATIENT
Start: 2019-02-21 | End: 2019-02-21

## 2019-02-21 RX ORDER — FENTANYL CITRATE 50 UG/ML
INJECTION, SOLUTION INTRAMUSCULAR; INTRAVENOUS PRN
Status: DISCONTINUED | OUTPATIENT
Start: 2019-02-21 | End: 2019-02-21

## 2019-02-21 RX ORDER — OXYCODONE AND ACETAMINOPHEN 5; 325 MG/1; MG/1
1-2 TABLET ORAL
Status: COMPLETED | OUTPATIENT
Start: 2019-02-21 | End: 2019-02-21

## 2019-02-21 RX ORDER — OXYCODONE AND ACETAMINOPHEN 5; 325 MG/1; MG/1
1-2 TABLET ORAL EVERY 4 HOURS PRN
Qty: 12 TABLET | Refills: 0 | Status: SHIPPED | OUTPATIENT
Start: 2019-02-21 | End: 2019-03-06

## 2019-02-21 RX ORDER — IBUPROFEN 200 MG
600 TABLET ORAL
Status: DISCONTINUED | OUTPATIENT
Start: 2019-02-21 | End: 2019-02-21 | Stop reason: HOSPADM

## 2019-02-21 RX ORDER — CEFAZOLIN SODIUM 1 G/3ML
1 INJECTION, POWDER, FOR SOLUTION INTRAMUSCULAR; INTRAVENOUS SEE ADMIN INSTRUCTIONS
Status: DISCONTINUED | OUTPATIENT
Start: 2019-02-21 | End: 2019-02-21 | Stop reason: HOSPADM

## 2019-02-21 RX ORDER — NALOXONE HYDROCHLORIDE 0.4 MG/ML
.1-.4 INJECTION, SOLUTION INTRAMUSCULAR; INTRAVENOUS; SUBCUTANEOUS
Status: DISCONTINUED | OUTPATIENT
Start: 2019-02-21 | End: 2019-02-21 | Stop reason: HOSPADM

## 2019-02-21 RX ORDER — MEPERIDINE HYDROCHLORIDE 50 MG/ML
12.5 INJECTION INTRAMUSCULAR; INTRAVENOUS; SUBCUTANEOUS
Status: DISCONTINUED | OUTPATIENT
Start: 2019-02-21 | End: 2019-02-21 | Stop reason: HOSPADM

## 2019-02-21 RX ORDER — ONDANSETRON 2 MG/ML
INJECTION INTRAMUSCULAR; INTRAVENOUS PRN
Status: DISCONTINUED | OUTPATIENT
Start: 2019-02-21 | End: 2019-02-21

## 2019-02-21 RX ORDER — BUPIVACAINE HYDROCHLORIDE AND EPINEPHRINE 5; 5 MG/ML; UG/ML
INJECTION, SOLUTION EPIDURAL; INTRACAUDAL; PERINEURAL PRN
Status: DISCONTINUED | OUTPATIENT
Start: 2019-02-21 | End: 2019-02-21 | Stop reason: HOSPADM

## 2019-02-21 RX ORDER — AMOXICILLIN 250 MG
1-2 CAPSULE ORAL 2 TIMES DAILY
Qty: 120 TABLET | Refills: 0 | Status: SHIPPED | OUTPATIENT
Start: 2019-02-21 | End: 2019-03-06

## 2019-02-21 RX ORDER — HYDROMORPHONE HYDROCHLORIDE 1 MG/ML
.3-.5 INJECTION, SOLUTION INTRAMUSCULAR; INTRAVENOUS; SUBCUTANEOUS EVERY 10 MIN PRN
Status: DISCONTINUED | OUTPATIENT
Start: 2019-02-21 | End: 2019-02-21 | Stop reason: HOSPADM

## 2019-02-21 RX ORDER — LIDOCAINE HYDROCHLORIDE 20 MG/ML
INJECTION, SOLUTION INFILTRATION; PERINEURAL PRN
Status: DISCONTINUED | OUTPATIENT
Start: 2019-02-21 | End: 2019-02-21

## 2019-02-21 RX ORDER — ONDANSETRON 4 MG/1
4 TABLET, ORALLY DISINTEGRATING ORAL EVERY 30 MIN PRN
Status: DISCONTINUED | OUTPATIENT
Start: 2019-02-21 | End: 2019-02-21 | Stop reason: HOSPADM

## 2019-02-21 RX ORDER — ONDANSETRON 2 MG/ML
4 INJECTION INTRAMUSCULAR; INTRAVENOUS EVERY 30 MIN PRN
Status: DISCONTINUED | OUTPATIENT
Start: 2019-02-21 | End: 2019-02-21 | Stop reason: HOSPADM

## 2019-02-21 RX ORDER — PROPOFOL 10 MG/ML
INJECTION, EMULSION INTRAVENOUS CONTINUOUS PRN
Status: DISCONTINUED | OUTPATIENT
Start: 2019-02-21 | End: 2019-02-21

## 2019-02-21 RX ADMIN — LIDOCAINE HYDROCHLORIDE 0.1 ML: 10 INJECTION, SOLUTION EPIDURAL; INFILTRATION; INTRACAUDAL; PERINEURAL at 12:10

## 2019-02-21 RX ADMIN — SODIUM CHLORIDE, SODIUM LACTATE, POTASSIUM CHLORIDE, AND CALCIUM CHLORIDE: 600; 310; 30; 20 INJECTION, SOLUTION INTRAVENOUS at 12:10

## 2019-02-21 RX ADMIN — LIDOCAINE HYDROCHLORIDE 80 MG: 20 INJECTION, SOLUTION INFILTRATION; PERINEURAL at 12:26

## 2019-02-21 RX ADMIN — ONDANSETRON 4 MG: 2 INJECTION INTRAMUSCULAR; INTRAVENOUS at 12:26

## 2019-02-21 RX ADMIN — CEFAZOLIN SODIUM 2 G: 2 INJECTION, SOLUTION INTRAVENOUS at 12:23

## 2019-02-21 RX ADMIN — PROPOFOL 100 MCG/KG/MIN: 10 INJECTION, EMULSION INTRAVENOUS at 12:26

## 2019-02-21 RX ADMIN — OXYCODONE AND ACETAMINOPHEN 2 TABLET: 5; 325 TABLET ORAL at 13:32

## 2019-02-21 RX ADMIN — PROPOFOL 80 MG: 10 INJECTION, EMULSION INTRAVENOUS at 12:26

## 2019-02-21 RX ADMIN — FENTANYL CITRATE 25 MCG: 50 INJECTION, SOLUTION INTRAMUSCULAR; INTRAVENOUS at 12:35

## 2019-02-21 RX ADMIN — MIDAZOLAM 2 MG: 1 INJECTION INTRAMUSCULAR; INTRAVENOUS at 12:20

## 2019-02-21 ASSESSMENT — COPD QUESTIONNAIRES
CAT_SEVERITY: MILD
COPD: 1

## 2019-02-21 ASSESSMENT — LIFESTYLE VARIABLES: TOBACCO_USE: 1

## 2019-02-21 ASSESSMENT — MIFFLIN-ST. JEOR: SCORE: 1133.37

## 2019-02-21 NOTE — ANESTHESIA CARE TRANSFER NOTE
Patient: Lisy Telles    Procedure(s):  RELEASE CARPAL TUNNEL    Diagnosis: bilateral carpal tunnel  Diagnosis Additional Information: No value filed.    Anesthesia Type:   MAC     Note:  Airway :Nasal Cannula  Patient transferred to:Phase II  Handoff Report: Identifed the Patient, Identified the Reponsible Provider, Reviewed the pertinent medical history, Discussed the surgical course, Reviewed Intra-OP anesthesia mangement and issues during anesthesia, Set expectations for post-procedure period and Allowed opportunity for questions and acknowledgement of understanding      Vitals: (Last set prior to Anesthesia Care Transfer)    CRNA VITALS  2/21/2019 1239 - 2/21/2019 1313      2/21/2019             NIBP:  91/55    NIBP Mean:  68    Resp Rate (observed):  1  (Abnormal)     Resp Rate (set):  10                Electronically Signed By: KELI Smith CRNA  February 21, 2019  1:13 PM

## 2019-02-21 NOTE — INTERVAL H&P NOTE
I saw and examined Lisy Telles.  I have reviewed the history and physical and find no changes to the patient's medical status or condition with the exceptions noted below.     Andrew Sahni   12:09 PM 2/21/2019

## 2019-02-21 NOTE — ANESTHESIA PREPROCEDURE EVALUATION
Anesthesia Pre-Procedure Evaluation    Patient: Lisy Telles   MRN: [id number] : 1960          Preoperative Diagnosis: bilateral carpal tunnel    Procedure(s):  RELEASE CARPAL TUNNEL    Past Medical History:   Diagnosis Date     Age-related osteoporosis with current pathological fracture with routine healing     2017     Anxiety disorder     No Comments Provided     Appendicitis     No Comments Provided     Benign neoplasm of connective and other soft tissue, unspecified     Right Breast     Brachial plexus disorders     2016     Chronic obstructive pulmonary disease (H)     No Comments Provided     Essential (primary) hypertension     No Comments Provided     Major depressive disorder, single episode     Depression with insomnia and tiredness     Other displaced fracture of upper end of left humerus, subsequent encounter for fracture with routine healing     2016     Other fracture of unspecified lower leg, initial encounter for closed fracture     Fracture, right ankle     Personal history of other medical treatment (CODE)      4, Para 4-0-0-4     Pure hypercholesterolemia     No Comments Provided     Rectal polyp     Hyperplastic polyp times three noted on colonoscopy 2007     Tobacco use     No Comments Provided     Uncomplicated alcohol dependence (H)     No Comments Provided     Past Surgical History:   Procedure Laterality Date     BRONCHOSCOPY DIAGNOSTIC  2018    MAC infection     CLOSED REDUCTION ANKLE      07,Open reduction/internal fixation, right ankle fracture 07.     COLONOSCOPY      ,screening for family history,  follow-up recommended in five years.     COLONOSCOPY      ,Colonoscopy 2007.  Follow-up recommended in five years.     COLONOSCOPY  2013,Normal, hemorrhoids - follow up 5 years for family history of colon cnacer     HYSTERECTOMY TOTAL ABDOMINAL      2007,Laparoscopic supracervical hysterectomy and  bilateral salpingo-oophorectomy for bleeding fibroid     LAPAROSCOPIC BYPASS GASTRIC      2007     LAPAROSCOPIC TUBAL LIGATION      1985     LOBECTOMY LUNG  08/07/2018    VATS - LOBECTOMY UPPER LUNG, Maciel, Dr Kohler     LUMPECTOMY BREAST      Lumpectomy of the right breast     SKIN GRAFT, EACH ADDN 100SQCM  03/08/2007    Skin grafting post right ankle fracture       Anesthesia Evaluation     . Pt has had prior anesthetic. Type: General    No history of anesthetic complications          ROS/MED HX    ENT/Pulmonary: Comment: S/P lobectomy     (+)tobacco use, mild COPD, , . .    Neurologic: Comment: Hx Alcoholism, TBI    (+)delerium resolved Yes,     Cardiovascular:     (+) Dyslipidemia, hypertension----. : . . CHAMBERLAIN, . :. .       METS/Exercise Tolerance:     Hematologic:  - neg hematologic  ROS       Musculoskeletal: Comment: CTS  (+) arthritis, , , -       GI/Hepatic:  - neg GI/hepatic ROS       Renal/Genitourinary:  - ROS Renal section negative       Endo:  - neg endo ROS       Psychiatric:     (+) psychiatric history anxiety and depression      Infectious Disease:  - neg infectious disease ROS       Malignancy:      - no malignancy   Other:    - neg other ROS                      Physical Exam  Normal systems: cardiovascular, pulmonary and dental    Airway   Mallampati: II  TM distance: >3 FB  Neck ROM: full    Dental     Cardiovascular   Rhythm and rate: regular and normal      Pulmonary    breath sounds clear to auscultation            Lab Results   Component Value Date    WBC 3.5 (L) 06/29/2018    HGB 12.1 06/29/2018    HCT 37.8 06/29/2018     06/29/2018     09/11/2017    POTASSIUM 4.0 09/11/2017    CHLORIDE 100 09/11/2017    CO2 25 09/11/2017    BUN 5 (L) 09/11/2017    CR 0.68 06/29/2018    GLC 56 (L) 09/11/2017    SYED 8.8 09/11/2017    ALBUMIN 3.2 (L) 09/11/2017    PROTTOTAL 6.5 09/11/2017    ALT 10 06/29/2018    AST 18 06/29/2018    ALKPHOS 127 (H) 09/11/2017    BILITOTAL 0.5 09/11/2017    KATERYNA  "17 09/04/2015    INR 0.9 09/11/2017       Preop Vitals  BP Readings from Last 3 Encounters:   02/12/19 124/88   02/06/19 152/88   01/16/19 122/70    Pulse Readings from Last 3 Encounters:   02/12/19 84   02/06/19 78   01/16/19 70      Resp Readings from Last 3 Encounters:   02/06/19 18   01/16/19 16   12/31/18 18    SpO2 Readings from Last 3 Encounters:   No data found for SpO2      Temp Readings from Last 1 Encounters:   02/12/19 98.9  F (37.2  C) (Temporal)    Ht Readings from Last 1 Encounters:   02/21/19 1.626 m (5' 4\")      Wt Readings from Last 1 Encounters:   02/21/19 56.3 kg (124 lb 3.2 oz)    Estimated body mass index is 21.32 kg/m  as calculated from the following:    Height as of this encounter: 1.626 m (5' 4\").    Weight as of this encounter: 56.3 kg (124 lb 3.2 oz).       Anesthesia Plan      History & Physical Review      ASA Status:  3 .    NPO Status:  > 8 hours    Plan for MAC          Postoperative Care      Consents  Anesthetic plan, risks, benefits and alternatives discussed with:  Patient..                 KELI Smith CRNA  "

## 2019-02-21 NOTE — DISCHARGE INSTRUCTIONS
[facility]  Adult Discharge Orders & Instructions      For 24 hours after surgery:  1. Get plenty of rest.  A responsible adult must stay with you for at least 24 hours after you leave the hospital.   2. You may feel lightheaded.  IF so, sit for a few minutes before standing.  Have someone help you get up.   3. You may have a slight fever. Call the doctor if your fever is over 101 F (38.3 C) (taken under the tongue) or lasts longer than 24 hours.  4. You may have a dry mouth, a sore throat, muscle aches or trouble sleeping.  These should go away after 24 hours.  5. Do not make important or legal decisions.                                                                                                                                                                           To contact a doctor, call    [phone]______________

## 2019-02-21 NOTE — ANESTHESIA POSTPROCEDURE EVALUATION
Patient: Lisy Telles    Procedure(s):  RELEASE CARPAL TUNNEL    Diagnosis:bilateral carpal tunnel  Diagnosis Additional Information: No value filed.    Anesthesia Type:  MAC    Note:  Anesthesia Post Evaluation    Patient location during evaluation: Phase 2  Patient participation: Able to fully participate in evaluation  Level of consciousness: awake and alert  Pain management: adequate  Airway patency: patent  Cardiovascular status: acceptable  Respiratory status: acceptable  Hydration status: acceptable  PONV: none     Anesthetic complications: None          Last vitals:  Vitals:    02/21/19 1200   BP: 140/84   Resp: 16   Temp: 99  F (37.2  C)   SpO2: 97%         Electronically Signed By: KELI Smith CRNA  February 21, 2019  1:15 PM

## 2019-02-21 NOTE — OP NOTE
DATE OF SERVICE: 2/21/2019     SURGEON:  Andrew Sahni MD    PREOPERATIVE DIAGNOSIS   Bilateral carpal tunnel syndrome, left worse than right.     POSTOPERATIVE DIAGNOSIS   Bilateral carpal tunnel syndrome, left worse than right.     PROCEDURE   Left carpaltunnel release.     ANESTHESIA   Monitor Anesthesia Care    ASSISTANT   Circulator: Richmond Jaramillo RN  Scrub Person: Keeley Emily  First Assistant: Roya Sahni RN  Pre-Op Nurse: Tracy Lambert RN    INDICATION FOR THE PROCEDURE   Patient is a 58 year old female who has a history of numbness in the left  hand, worse at night. EMG confirmed a bilateral carpal tunnel syndrome.     DESCRIPTION OF PROCEDURE   After adequate regional anesthesia, the patient was prepped and draped in   usual sterile fashion. Then 0.25% Marcaine plain was infiltrated in the skin   and subcutaneous tissue about the thenar eminence skin crease. A curvilinear   incision was made through the thenar eminence skin crease, carried down   through thesuperficial palmar fascia. This exposed the transverse carpal   ligament, which was divided from a distal to proximal direction, taking care   to stay on the ulnar side of the median nerve. This was carried into the  wrist. The 's little finger passed easily alongside the nerve without   restriction. Subcutaneous tissue was reapproximated with interrupted 3-0   Vicryl sutures, the skin closed with a running 3-0 nylon suture. A bulky hand   dressing was applied, and the patient taken to day surgery in stable   condition.     Andrew Sahni MD on 2/21/2019 at 1:31 PM

## 2019-03-06 ENCOUNTER — OFFICE VISIT (OUTPATIENT)
Dept: SURGERY | Facility: OTHER | Age: 59
End: 2019-03-06
Attending: SURGERY
Payer: OTHER MISCELLANEOUS

## 2019-03-06 VITALS
BODY MASS INDEX: 21.31 KG/M2 | HEIGHT: 64 IN | SYSTOLIC BLOOD PRESSURE: 128 MMHG | TEMPERATURE: 96.3 F | HEART RATE: 72 BPM | DIASTOLIC BLOOD PRESSURE: 84 MMHG | WEIGHT: 124.8 LBS

## 2019-03-06 DIAGNOSIS — G56.03 BILATERAL CARPAL TUNNEL SYNDROME: Primary | ICD-10-CM

## 2019-03-06 PROCEDURE — 99024 POSTOP FOLLOW-UP VISIT: CPT | Performed by: SURGERY

## 2019-03-06 ASSESSMENT — MIFFLIN-ST. JEOR: SCORE: 1136.09

## 2019-03-06 ASSESSMENT — PAIN SCALES - GENERAL: PAINLEVEL: NO PAIN (0)

## 2019-03-06 NOTE — PROGRESS NOTES
"Patient presents for post surgical visit after left carpal tunnel release on 2/21. Patient has done well. No problems with incision.  No more paraesthesias. Pain is well controlled.     /84 (BP Location: Right arm, Patient Position: Sitting, Cuff Size: Adult Regular)   Pulse 72   Temp 96.3  F (35.7  C) (Temporal)   Ht 1.626 m (5' 4\")   Wt 56.6 kg (124 lb 12.8 oz)   Breastfeeding? No   BMI 21.42 kg/m      General: NAD, pleasant and cooperative with exam and interview.  Ext: healing incision. Sutures removed. No sign of infection. No pain with palpation.  Psychiatry: awake, alert and oriented. Appropriate affect.    Assessment/Plan:  58 year old female with bilateral carpal tunnel. Pt is weighing when and if to proceed with the right. Patient can return to light left handed work and work as tolerated with the right. Patient will call with questions or concerns.    - OT referral   - Can schedule after 2 weeks for the right.     Andrew Sahni MD on 3/6/2019 at 10:55 AM      "

## 2019-03-06 NOTE — NURSING NOTE
"Chief Complaint   Patient presents with     Surgical Followup     carpel tunnel       Initial /84 (BP Location: Right arm, Patient Position: Sitting, Cuff Size: Adult Regular)   Pulse 72   Temp 96.3  F (35.7  C) (Temporal)   Ht 1.626 m (5' 4\")   Wt 56.6 kg (124 lb 12.8 oz)   Breastfeeding? No   BMI 21.42 kg/m   Estimated body mass index is 21.42 kg/m  as calculated from the following:    Height as of this encounter: 1.626 m (5' 4\").    Weight as of this encounter: 56.6 kg (124 lb 12.8 oz).  Medication Reconciliation: complete    Maria Teresa Hudson LPN  "

## 2019-03-21 DIAGNOSIS — I10 ESSENTIAL HYPERTENSION: ICD-10-CM

## 2019-03-22 RX ORDER — LISINOPRIL 5 MG/1
TABLET ORAL
Qty: 90 TABLET | Refills: 0 | Status: SHIPPED | OUTPATIENT
Start: 2019-03-22 | End: 2020-01-16

## 2019-03-22 NOTE — TELEPHONE ENCOUNTER
RN refill protocol fails as follows:    Rerun Protocol (3/22/2019 3:02 PM)      Normal serum potassium on file in past 12 months          Recent Labs   Lab Test 09/11/17  1708   POTASSIUM 4.0           Chart review shows that patient was last seen by PCP on 2/6/19 for a work comp office visit. Writer is unable to fill Rx as requested. Will slade up and route Rx request to PCP for his consideration/approval.    Unable to complete prescription refill per RN Medication Refill Policy. Kenny Chamorro 3/22/2019 3:04 PM

## 2019-03-27 ENCOUNTER — OFFICE VISIT (OUTPATIENT)
Dept: SURGERY | Facility: OTHER | Age: 59
End: 2019-03-27
Attending: SPECIALIST
Payer: OTHER MISCELLANEOUS

## 2019-03-27 VITALS
WEIGHT: 125.8 LBS | HEART RATE: 97 BPM | SYSTOLIC BLOOD PRESSURE: 124 MMHG | DIASTOLIC BLOOD PRESSURE: 70 MMHG | RESPIRATION RATE: 18 BRPM | TEMPERATURE: 97.7 F | BODY MASS INDEX: 21.59 KG/M2

## 2019-03-27 DIAGNOSIS — G56.01 RIGHT CARPAL TUNNEL SYNDROME: ICD-10-CM

## 2019-03-27 PROCEDURE — 99212 OFFICE O/P EST SF 10 MIN: CPT | Mod: 24 | Performed by: SURGERY

## 2019-03-27 ASSESSMENT — PAIN SCALES - GENERAL: PAINLEVEL: MILD PAIN (2)

## 2019-03-27 NOTE — PROGRESS NOTES
GENERAL SURGERY CONSULTATION NOTE    Lisy Telles   [address]  [facility] [place] [address]  58 year old  female  Admission Date/Time: No admission date for patient encounter.  Primary Care Provider:  Silvino Peace was asked to see this patient by Dr. Peace for evaluation of carpal tunnel syndrome.     HPI: Lisy Telles is a 58 year old female who began having wrist pain after starting work at [employer].  Pt notes pain and swelling in the wrists and hands. This would wake her at night. She tried bracing and sort courses of naproxen. She does note GI upset with Aleve. Takes gabapentin for RLS.     Pt is s/p left carpal tunnel release and doing well. Pt has function back and able to ADLs without assistance.  She would like to pursue right carpal tunnel release.     REVIEW OF SYSTEMS:    GENERAL: No fevers or chills. Denies fatigue, recent weight loss.  HEENT: No sinus drainage. No changes with vision or hearing. No difficulty swallowing.   LYMPHATICS:  No swollen nodes in axilla, neck or groin.  CARDIOVASCULAR: Denies chest pain, palpitations and dyspnea on exertion.  PULMONARY: No shortness of breath or cough. No increase in sputum production. Hx of asthma  GI: Denies melena, bright red blood in stools. No hematemesis. No constipation or diarrhea.  : No dysuria or hematuria.  SKIN: No recent rashes or ulcers.   HEMATOLOGY:  No history of easy bruising or bleeding.  ENDOCRINE:  No history of diabetes or thyroid problems. ++ cold intolerance.   NEUROLOGY:  No history of seizures or headaches. No motor or sensory changes.        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)     Hypertension     Liver laceration, grade IV, with open wound into cavity     Lung nodule < 6cm on CT     Pneumothorax, closed, traumatic     Spleen laceration     Tobacco abuse     Traumatic brain injury with loss of consciousness of 30 minutes or less (H)     Restless legs syndrome (RLS)     Bilateral carpal tunnel syndrome     De Quervain's disease (tenosynovitis)       Past Medical History:   Diagnosis Date     Age-related osteoporosis with current pathological fracture with routine healing     2017     Anxiety disorder     No Comments Provided     Appendicitis     No Comments Provided     Benign neoplasm of connective and other soft tissue, unspecified     Right Breast     Brachial plexus disorders     2016     Chronic obstructive pulmonary disease (H)     No Comments Provided     Essential (primary) hypertension     No Comments Provided     Major depressive disorder, single episode     Depression with insomnia and tiredness     Other displaced fracture of upper end of left humerus, subsequent encounter for fracture with routine healing     2016     Other fracture of unspecified lower leg, initial encounter for closed fracture     Fracture, right ankle     Personal history of other medical treatment (CODE)      4, Para 4-0-0-4     Pure hypercholesterolemia     No Comments Provided     Rectal polyp     Hyperplastic polyp times three noted on colonoscopy 2007     Tobacco use     No Comments Provided     Uncomplicated alcohol dependence (H)     No Comments Provided       Past Surgical History:   Procedure Laterality Date     BRONCHOSCOPY DIAGNOSTIC  2018    MAC infection     CLOSED REDUCTION ANKLE      07,Open reduction/internal fixation, right ankle fracture 07.     COLONOSCOPY      ,screening for  family history,  follow-up recommended in five years.     COLONOSCOPY      ,Colonoscopy 2007.  Follow-up recommended in five years.     COLONOSCOPY  2013,Normal, hemorrhoids - follow up 5 years for family history of colon cnacer     HYSTERECTOMY TOTAL ABDOMINAL      2007,Laparoscopic supracervical hysterectomy and bilateral salpingo-oophorectomy for bleeding fibroid     LAPAROSCOPIC BYPASS GASTRIC           LAPAROSCOPIC TUBAL LIGATION      1985     LOBECTOMY LUNG  2018    VATS - LOBECTOMY UPPER LUNG, Dr Edita St     LUMPECTOMY BREAST      Lumpectomy of the right breast     RELEASE CARPAL TUNNEL Left 2019    Procedure: RELEASE CARPAL TUNNEL;  Surgeon: Andrew Sahni MD;  Location: [facility] OR     SKIN GRAFT, EACH ADDN 100SQCM  2007    Skin grafting post right ankle fracture       Family History   Problem Relation Age of Onset     Heart Disease Father 36        Heart Disease,MI  - Quadruple bypass     Hypertension Father         Hypertension     Other - See Comments Father         Obesity     Colon Cancer Mother 40        Cancer-colon,surgical resection--remission     Hypertension Mother         Hypertension     Other - See Comments Mother         Obesity     Colon Cancer Maternal Grandmother         Cancer-colon     Thyroid Disease Sister         Thyroid Disease     Breast Cancer Paternal Aunt 60        Cancer-breast,       Social History     Social History Narrative    . Drinks 6 beers daily.       Social History     Socioeconomic History     Marital status:      Spouse name: noris     Number of children: Not on file     Years of education: 11 GED     Highest education level: Not on file   Occupational History     Not on file   Social Needs     Financial resource strain: Not on file     Food insecurity:     Worry: Not on file     Inability: Not on file     Transportation needs:     Medical: Not on file     Non-medical: Not on file    Tobacco Use     Smoking status: Light Tobacco Smoker     Packs/day: 0.00     Years: 43.00     Pack years: 0.00     Types: Cigarettes     Smokeless tobacco: Never Used   Substance and Sexual Activity     Alcohol use: No     Alcohol/week: 0.0 oz     Comment: Alcoholic Drinks/day: 4-5 beers and 1/2 pt vodka daily     Drug use: No     Comment: Drug use: No     Sexual activity: Yes     Partners: Male     Birth control/protection: Surgical   Lifestyle     Physical activity:     Days per week: Not on file     Minutes per session: Not on file     Stress: Not on file   Relationships     Social connections:     Talks on phone: Not on file     Gets together: Not on file     Attends [employer] service: Not on file     Active member of club or organization: Not on file     Attends meetings of clubs or organizations: Not on file     Relationship status: Not on file     Intimate partner violence:     Fear of current or ex partner: Not on file     Emotionally abused: Not on file     Physically abused: Not on file     Forced sexual activity: Not on file   Other Topics Concern     Parent/sibling w/ CABG, MI or angioplasty before 65F 55M? Not Asked   Social History Narrative    . Drinks 6 beers daily.         Current Outpatient Medications on File Prior to Visit:  albuterol (2.5 MG/3ML) 0.083% neb solution Take 1 vial (2.5 mg) by nebulization 4 times daily   albuterol (PROAIR HFA) 108 (90 Base) MCG/ACT inhaler Inhale 2 puffs into the lungs 4 times daily as needed   alendronate (FOSAMAX) 70 MG tablet Take 1 tablet by mouth once a week in the morning. Take on empty stomach with full glass of water. Do not lie down for 1 hr.   celecoxib (CELEBREX) 50 MG capsule Take 1 capsule (50 mg) by mouth 2 times daily   ethambutol (MYAMBUTOL) 400 MG tablet Take 3 tablets by mouth daily   fluticasone (FLONASE) 50 MCG/ACT spray Spray 1-2 sprays into both nostrils daily As needed  To prevent allergy symptoms. Must be used regularly to be  effective.   gabapentin (NEURONTIN) 400 MG capsule Take 2 capsules (800 mg) by mouth At Bedtime   hydrOXYzine (ATARAX) 10 MG tablet Take 1-2 tablets by mouth 3 times daily as needed   lisinopril (PRINIVIL/ZESTRIL) 5 MG tablet TAKE ONE TABLET BY MOUTH EVERY DAY   rifampin (RIFADIN) 300 MG capsule Take 2 capsules by mouth daily   sodium chloride 3 % NEBU neb solution INHALE 3ML AS DIRECTED   traZODone (DESYREL) 150 MG tablet Take 1 tablet (150 mg) by mouth At Bedtime   venlafaxine (EFFEXOR-XR) 150 MG 24 hr capsule Take 2 capsules (300 mg) by mouth daily with food   witch hazel-glycerin (TUCKS) pad Apply 1 applicator topically     No current facility-administered medications on file prior to visit.       ALLERGIES/SENSITIVITIES: No Known Allergies    PHYSICAL EXAM:     /70 (BP Location: Right arm, Patient Position: Sitting, Cuff Size: Adult Regular)   Pulse 97   Temp 97.7  F (36.5  C) (Temporal)   Resp 18   Wt 57.1 kg (125 lb 12.8 oz)   Breastfeeding? No   BMI 21.59 kg/m      General Appearance:  Appears well. Poor dentition.   Heart & CV:  RRR no murmur.  Intact distal pulses, good cap refill.  LUNGS:  CTA B/L, no wheezing or crackles.  Abd:  Non-distended  Ext: Left hand shows thenar wasting. Ulnar strength and sensation intact. Incision in healing well. Right hand hasl less thenar wasting.       ADDITIONAL COMMENTS:      CONSULTATION ASSESSMENT AND PLAN:    58 year old female with bilateral carpal tunnel syndrome s/p left carpal tunnel release with good relief of symptoms. Pt would like to pursue right carpal tunnel release and return to work.     - Will proceed with right carpal tunnel release    - Discussed risk of bleeding, infection and injury to adjacent structures including nerve branches.     Andrew Sahni MD on 3/27/2019 at 10:36 AM

## 2019-03-27 NOTE — NURSING NOTE
Chief Complaint   Patient presents with     Consult     R carpal tunnel      Had Left hand done in february. R hand has been bothering her since November.     Medication Reconciliation: complete    Linda Yo LPN

## 2019-03-27 NOTE — H&P (VIEW-ONLY)
GENERAL SURGERY CONSULTATION NOTE    Lisy Telles   [address]  [facility] [address]  58 year old  female  Admission Date/Time: No admission date for patient encounter.  Primary Care Provider:  Silvino Peace was asked to see this patient by Dr. Peace for evaluation of carpal tunnel syndrome.     HPI: Lisy Telles is a 58 year old female who began having wrist pain after starting work at [employer].  Pt notes pain and swelling in the wrists and hands. This would wake her at night. She tried bracing and sort courses of naproxen. She does note GI upset with Aleve. Takes gabapentin for RLS.     Pt is s/p left carpal tunnel release and doing well. Pt has function back and able to ADLs without assistance.  She would like to pursue right carpal tunnel release.     REVIEW OF SYSTEMS:    GENERAL: No fevers or chills. Denies fatigue, recent weight loss.  HEENT: No sinus drainage. No changes with vision or hearing. No difficulty swallowing.   LYMPHATICS:  No swollen nodes in axilla, neck or groin.  CARDIOVASCULAR: Denies chest pain, palpitations and dyspnea on exertion.  PULMONARY: No shortness of breath or cough. No increase in sputum production. Hx of asthma  GI: Denies melena, bright red blood in stools. No hematemesis. No constipation or diarrhea.  : No dysuria or hematuria.  SKIN: No recent rashes or ulcers.   HEMATOLOGY:  No history of easy bruising or bleeding.  ENDOCRINE:  No history of diabetes or thyroid problems. ++ cold intolerance.   NEUROLOGY:  No history of seizures or headaches. No motor or sensory changes.        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)     Hypertension     Liver laceration, grade IV, with open wound into cavity     Lung nodule < 6cm on CT     Pneumothorax, closed, traumatic     Spleen laceration     Tobacco abuse     Traumatic brain injury with loss of consciousness of 30 minutes or less (H)     Restless legs syndrome (RLS)     Bilateral carpal tunnel syndrome     De Quervain's disease (tenosynovitis)       Past Medical History:   Diagnosis Date     Age-related osteoporosis with current pathological fracture with routine healing     2017     Anxiety disorder     No Comments Provided     Appendicitis     No Comments Provided     Benign neoplasm of connective and other soft tissue, unspecified     Right Breast     Brachial plexus disorders     2016     Chronic obstructive pulmonary disease (H)     No Comments Provided     Essential (primary) hypertension     No Comments Provided     Major depressive disorder, single episode     Depression with insomnia and tiredness     Other displaced fracture of upper end of left humerus, subsequent encounter for fracture with routine healing     2016     Other fracture of unspecified lower leg, initial encounter for closed fracture     Fracture, right ankle     Personal history of other medical treatment (CODE)      4, Para 4-0-0-4     Pure hypercholesterolemia     No Comments Provided     Rectal polyp     Hyperplastic polyp times three noted on colonoscopy 2007     Tobacco use     No Comments Provided     Uncomplicated alcohol dependence (H)     No Comments Provided       Past Surgical History:   Procedure Laterality Date     BRONCHOSCOPY DIAGNOSTIC  2018    MAC infection     CLOSED REDUCTION ANKLE      07,Open reduction/internal fixation, right ankle fracture 07.     COLONOSCOPY      ,screening for  family history,  follow-up recommended in five years.     COLONOSCOPY      ,Colonoscopy 2007.  Follow-up recommended in five years.     COLONOSCOPY  2013,Normal, hemorrhoids - follow up 5 years for family history of colon cnacer     HYSTERECTOMY TOTAL ABDOMINAL      2007,Laparoscopic supracervical hysterectomy and bilateral salpingo-oophorectomy for bleeding fibroid     LAPAROSCOPIC BYPASS GASTRIC           LAPAROSCOPIC TUBAL LIGATION      1985     LOBECTOMY LUNG  2018    VATS - LOBECTOMY UPPER LUNG, Dr Edita St     LUMPECTOMY BREAST      Lumpectomy of the right breast     RELEASE CARPAL TUNNEL Left 2019    Procedure: RELEASE CARPAL TUNNEL;  Surgeon: Andrew Sahni MD;  Location: [facility] OR     SKIN GRAFT, EACH ADDN 100SQCM  2007    Skin grafting post right ankle fracture       Family History   Problem Relation Age of Onset     Heart Disease Father 36        Heart Disease,MI  - Quadruple bypass     Hypertension Father         Hypertension     Other - See Comments Father         Obesity     Colon Cancer Mother 40        Cancer-colon,surgical resection--remission     Hypertension Mother         Hypertension     Other - See Comments Mother         Obesity     Colon Cancer Maternal Grandmother         Cancer-colon     Thyroid Disease Sister         Thyroid Disease     Breast Cancer Paternal Aunt 60        Cancer-breast,       Social History     Social History Narrative    . Drinks 6 beers daily.       Social History     Socioeconomic History     Marital status:      Spouse name: noris     Number of children: Not on file     Years of education: 11 GED     Highest education level: Not on file   Occupational History     Not on file   Social Needs     Financial resource strain: Not on file     Food insecurity:     Worry: Not on file     Inability: Not on file     Transportation needs:     Medical: Not on file     Non-medical: Not on file    Tobacco Use     Smoking status: Light Tobacco Smoker     Packs/day: 0.00     Years: 43.00     Pack years: 0.00     Types: Cigarettes     Smokeless tobacco: Never Used   Substance and Sexual Activity     Alcohol use: No     Alcohol/week: 0.0 oz     Comment: Alcoholic Drinks/day: 4-5 beers and 1/2 pt vodka daily     Drug use: No     Comment: Drug use: No     Sexual activity: Yes     Partners: Male     Birth control/protection: Surgical   Lifestyle     Physical activity:     Days per week: Not on file     Minutes per session: Not on file     Stress: Not on file   Relationships     Social connections:     Talks on phone: Not on file     Gets together: Not on file     Attends Scientology service: Not on file     Active member of club or organization: Not on file     Attends meetings of clubs or organizations: Not on file     Relationship status: Not on file     Intimate partner violence:     Fear of current or ex partner: Not on file     Emotionally abused: Not on file     Physically abused: Not on file     Forced sexual activity: Not on file   Other Topics Concern     Parent/sibling w/ CABG, MI or angioplasty before 65F 55M? Not Asked   Social History Narrative    . Drinks 6 beers daily.         Current Outpatient Medications on File Prior to Visit:  albuterol (2.5 MG/3ML) 0.083% neb solution Take 1 vial (2.5 mg) by nebulization 4 times daily   albuterol (PROAIR HFA) 108 (90 Base) MCG/ACT inhaler Inhale 2 puffs into the lungs 4 times daily as needed   alendronate (FOSAMAX) 70 MG tablet Take 1 tablet by mouth once a week in the morning. Take on empty stomach with full glass of water. Do not lie down for 1 hr.   celecoxib (CELEBREX) 50 MG capsule Take 1 capsule (50 mg) by mouth 2 times daily   ethambutol (MYAMBUTOL) 400 MG tablet Take 3 tablets by mouth daily   fluticasone (FLONASE) 50 MCG/ACT spray Spray 1-2 sprays into both nostrils daily As needed  To prevent allergy symptoms. Must be used regularly to be  effective.   gabapentin (NEURONTIN) 400 MG capsule Take 2 capsules (800 mg) by mouth At Bedtime   hydrOXYzine (ATARAX) 10 MG tablet Take 1-2 tablets by mouth 3 times daily as needed   lisinopril (PRINIVIL/ZESTRIL) 5 MG tablet TAKE ONE TABLET BY MOUTH EVERY DAY   rifampin (RIFADIN) 300 MG capsule Take 2 capsules by mouth daily   sodium chloride 3 % NEBU neb solution INHALE 3ML AS DIRECTED   traZODone (DESYREL) 150 MG tablet Take 1 tablet (150 mg) by mouth At Bedtime   venlafaxine (EFFEXOR-XR) 150 MG 24 hr capsule Take 2 capsules (300 mg) by mouth daily with food   witch hazel-glycerin (TUCKS) pad Apply 1 applicator topically     No current facility-administered medications on file prior to visit.       ALLERGIES/SENSITIVITIES: No Known Allergies    PHYSICAL EXAM:     /70 (BP Location: Right arm, Patient Position: Sitting, Cuff Size: Adult Regular)   Pulse 97   Temp 97.7  F (36.5  C) (Temporal)   Resp 18   Wt 57.1 kg (125 lb 12.8 oz)   Breastfeeding? No   BMI 21.59 kg/m      General Appearance:  Appears well. Poor dentition.   Heart & CV:  RRR no murmur.  Intact distal pulses, good cap refill.  LUNGS:  CTA B/L, no wheezing or crackles.  Abd:  Non-distended  Ext: Left hand shows thenar wasting. Ulnar strength and sensation intact. Incision in healing well. Right hand hasl less thenar wasting.       ADDITIONAL COMMENTS:      CONSULTATION ASSESSMENT AND PLAN:    58 year old female with bilateral carpal tunnel syndrome s/p left carpal tunnel release with good relief of symptoms. Pt would like to pursue right carpal tunnel release and return to work.     - Will proceed with right carpal tunnel release    - Discussed risk of bleeding, infection and injury to adjacent structures including nerve branches.     Andrew Sahni MD on 3/27/2019 at 10:36 AM

## 2019-04-02 ENCOUNTER — HOSPITAL ENCOUNTER (OUTPATIENT)
Dept: OCCUPATIONAL THERAPY | Facility: OTHER | Age: 59
Setting detail: THERAPIES SERIES
End: 2019-04-02
Attending: SURGERY
Payer: OTHER MISCELLANEOUS

## 2019-04-02 DIAGNOSIS — G56.03 BILATERAL CARPAL TUNNEL SYNDROME: ICD-10-CM

## 2019-04-02 PROCEDURE — 97035 APP MDLTY 1+ULTRASOUND EA 15: CPT | Mod: GO | Performed by: OCCUPATIONAL THERAPIST

## 2019-04-02 PROCEDURE — 97110 THERAPEUTIC EXERCISES: CPT | Mod: GO | Performed by: OCCUPATIONAL THERAPIST

## 2019-04-02 PROCEDURE — 97018 PARAFFIN BATH THERAPY: CPT | Mod: GO | Performed by: OCCUPATIONAL THERAPIST

## 2019-04-02 PROCEDURE — 97140 MANUAL THERAPY 1/> REGIONS: CPT | Mod: GO | Performed by: OCCUPATIONAL THERAPIST

## 2019-04-02 PROCEDURE — 97165 OT EVAL LOW COMPLEX 30 MIN: CPT | Mod: GO | Performed by: OCCUPATIONAL THERAPIST

## 2019-04-02 NOTE — PROGRESS NOTES
04/02/19 1100   Quick Adds   Type of Visit Initial Outpatient Occupational Therapy Evaluation   General Information   Start Of Care Date 04/02/19   Referring Physician Dr. 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 In November she started noticing numbness in her hands. On the 19th she went to PCP and discovered she had dequarvain's  and CT. She wore splint and had an EMG. She underwent open CT release on February 21st. She is reporting some achiness and weakness. She will have her right hand done on April 4th.    Role/Living Environment   Patient/family Goals Statement Patient reports she wouldliek to get the strength back in her hand    Pain   Patient currently in pain Yes   Pain location over CT scar   Pain rating 2   Pain description Ache   Fall Risk Screen   Fall screen completed by OT   Have you fallen 2 or more times in the past year? No   Have you fallen and had an injury in the past year? No   Is patient a fall risk? No   Abuse Screen (yes response referral indicated)   Feels Unsafe at Home or Work/School no   Feels Threatened by Someone no   Does Anyone Try to Keep You From Having Contact with Others or Doing Things Outside Your Home? no   Physical Signs of Abuse Present no   Cognitive Status Examination   Orientation Orientation to person, place and time   Range of Motion (ROM)   ROM Comments wrist: Left: FLex: 90   EXT:    85   RD:22    UD: 32   Right:   FLEx:  90  EXT:     90     RD:  20   UD: 20   Hand Strength   Hand Dominance Right   Left Hand  (pounds) 30 pounds   Right Hand  (pounds) 55 pounds   Left Lateral Pinch (pounds) 10 pounds   Right Lateral Pinch (pounds) 15  pounds   Left Three Point Pinch (pounds) 8 pounds   Right Three Point Pinch (pounds) 14 pounds   Planned Therapy Interventions   Planned Therapy Interventions Strengthening;ROM;Manual therapy;Stretching   Planned Modalities Paraffin bath;Ultrasound;Iontophoresis   Intervention Comments phonophoresis with 10$ ketoprofen, iontophoresis wtih 4% dexamethosone   OT Goal 1   Goal Identifier home management   Goal Description Patient will report the ability to ring out her mop using both hands with no difficulty. currently mild   Target Date 05/07/19   OT Goal 2   Goal Identifier Self cares   Goal Description Patient will report the ability to pullup her pants using with no difficulty. currently mild   Target Date 04/30/19   OT Goal 3   Goal Identifier lifting   Goal Description patient will report the ability to lift/carry heavy objects over 10# with both hands and no difficulty   Target Date 05/14/19   Clinical Impression   Criteria for Skilled Therapeutic Interventions Met Yes, treatment indicated   OT Diagnosis S/P Left open CTR decline in self cares   Influenced by the following impairments weakness,    Assessment of Occupational Performance 1-3 Performance Deficits   Identified Performance Deficits dressing, home management, lifting.    Clinical Decision Making (Complexity) Low complexity   Therapy Frequency 2x/ daily as needs    Predicted Duration of Therapy Intervention (days/wks) 8 weeks   Risks and Benefits of Treatment have been explained. Yes   Patient, Family & other staff in agreement with plan of care Yes   Clinical Impression Comments snar is slightly red and hard. no sign of infection oradhesions.    Education Assessment   Barriers To Learning No Barriers   Preferred Learning Style Demonstration;Pictures/video   Total Evaluation Time   OT Eval, Low Complexity Minutes (35969) 20

## 2019-04-03 ENCOUNTER — ANESTHESIA EVENT (OUTPATIENT)
Dept: SURGERY | Facility: OTHER | Age: 59
End: 2019-04-03
Payer: OTHER MISCELLANEOUS

## 2019-04-03 RX ORDER — FENTANYL CITRATE 50 UG/ML
25-50 INJECTION, SOLUTION INTRAMUSCULAR; INTRAVENOUS
Status: CANCELLED | OUTPATIENT
Start: 2019-04-03

## 2019-04-04 ENCOUNTER — HOSPITAL ENCOUNTER (OUTPATIENT)
Facility: OTHER | Age: 59
Discharge: HOME | End: 2019-04-04
Attending: SURGERY | Admitting: SURGERY
Payer: OTHER MISCELLANEOUS

## 2019-04-04 ENCOUNTER — ANESTHESIA (OUTPATIENT)
Dept: SURGERY | Facility: OTHER | Age: 59
End: 2019-04-04
Payer: OTHER MISCELLANEOUS

## 2019-04-04 VITALS
RESPIRATION RATE: 16 BRPM | OXYGEN SATURATION: 100 % | WEIGHT: 125 LBS | TEMPERATURE: 97.5 F | BODY MASS INDEX: 21.46 KG/M2 | SYSTOLIC BLOOD PRESSURE: 134 MMHG | DIASTOLIC BLOOD PRESSURE: 81 MMHG | HEART RATE: 72 BPM

## 2019-04-04 DIAGNOSIS — G56.01 RIGHT CARPAL TUNNEL SYNDROME: Primary | ICD-10-CM

## 2019-04-04 PROCEDURE — 36000052 ZZH SURGERY LEVEL 2 EA 15 ADDTL MIN: Performed by: SURGERY

## 2019-04-04 PROCEDURE — 71000027 ZZH RECOVERY PHASE 2 EACH 15 MINS: Performed by: SURGERY

## 2019-04-04 PROCEDURE — 64721 CARPAL TUNNEL SURGERY: CPT | Performed by: NURSE ANESTHETIST, CERTIFIED REGISTERED

## 2019-04-04 PROCEDURE — 25800030 ZZH RX IP 258 OP 636: Performed by: NURSE ANESTHETIST, CERTIFIED REGISTERED

## 2019-04-04 PROCEDURE — 27210794 ZZH OR GENERAL SUPPLY STERILE: Performed by: SURGERY

## 2019-04-04 PROCEDURE — 25000125 ZZHC RX 250: Performed by: SURGERY

## 2019-04-04 PROCEDURE — 36000050 ZZH SURGERY LEVEL 2 1ST 30 MIN: Performed by: SURGERY

## 2019-04-04 PROCEDURE — 25000128 H RX IP 250 OP 636: Performed by: SURGERY

## 2019-04-04 PROCEDURE — 64721 CARPAL TUNNEL SURGERY: CPT | Performed by: SURGERY

## 2019-04-04 PROCEDURE — 40000306 ZZH STATISTIC PRE PROC ASSESS II: Performed by: SURGERY

## 2019-04-04 PROCEDURE — 25000128 H RX IP 250 OP 636: Performed by: NURSE ANESTHETIST, CERTIFIED REGISTERED

## 2019-04-04 PROCEDURE — 37000009 ZZH ANESTHESIA TECHNICAL FEE, EACH ADDTL 15 MIN: Performed by: SURGERY

## 2019-04-04 PROCEDURE — 25000125 ZZHC RX 250: Performed by: NURSE ANESTHETIST, CERTIFIED REGISTERED

## 2019-04-04 PROCEDURE — 37000008 ZZH ANESTHESIA TECHNICAL FEE, 1ST 30 MIN: Performed by: SURGERY

## 2019-04-04 RX ORDER — MEPERIDINE HYDROCHLORIDE 50 MG/ML
12.5 INJECTION INTRAMUSCULAR; INTRAVENOUS; SUBCUTANEOUS
Status: DISCONTINUED | OUTPATIENT
Start: 2019-04-04 | End: 2019-04-04 | Stop reason: HOSPADM

## 2019-04-04 RX ORDER — MAGNESIUM HYDROXIDE 1200 MG/15ML
LIQUID ORAL PRN
Status: DISCONTINUED | OUTPATIENT
Start: 2019-04-04 | End: 2019-04-04 | Stop reason: HOSPADM

## 2019-04-04 RX ORDER — NALOXONE HYDROCHLORIDE 0.4 MG/ML
.1-.4 INJECTION, SOLUTION INTRAMUSCULAR; INTRAVENOUS; SUBCUTANEOUS
Status: DISCONTINUED | OUTPATIENT
Start: 2019-04-04 | End: 2019-04-04 | Stop reason: HOSPADM

## 2019-04-04 RX ORDER — OXYCODONE AND ACETAMINOPHEN 5; 325 MG/1; MG/1
1 TABLET ORAL
Status: DISCONTINUED | OUTPATIENT
Start: 2019-04-04 | End: 2019-04-04 | Stop reason: HOSPADM

## 2019-04-04 RX ORDER — PROPOFOL 10 MG/ML
INJECTION, EMULSION INTRAVENOUS PRN
Status: DISCONTINUED | OUTPATIENT
Start: 2019-04-04 | End: 2019-04-04

## 2019-04-04 RX ORDER — ACETAMINOPHEN 325 MG/1
650 TABLET ORAL EVERY 4 HOURS PRN
Qty: 50 TABLET | Refills: 0 | Status: SHIPPED | OUTPATIENT
Start: 2019-04-04 | End: 2019-04-16

## 2019-04-04 RX ORDER — ONDANSETRON 4 MG/1
4 TABLET, ORALLY DISINTEGRATING ORAL
Status: DISCONTINUED | OUTPATIENT
Start: 2019-04-04 | End: 2019-04-04 | Stop reason: HOSPADM

## 2019-04-04 RX ORDER — OXYCODONE AND ACETAMINOPHEN 5; 325 MG/1; MG/1
1-2 TABLET ORAL EVERY 4 HOURS PRN
Qty: 12 TABLET | Refills: 0 | Status: SHIPPED | OUTPATIENT
Start: 2019-04-04 | End: 2019-04-16

## 2019-04-04 RX ORDER — CEFAZOLIN SODIUM 1 G/50ML
1 INJECTION, SOLUTION INTRAVENOUS SEE ADMIN INSTRUCTIONS
Status: DISCONTINUED | OUTPATIENT
Start: 2019-04-04 | End: 2019-04-04 | Stop reason: HOSPADM

## 2019-04-04 RX ORDER — SODIUM CHLORIDE, SODIUM LACTATE, POTASSIUM CHLORIDE, CALCIUM CHLORIDE 600; 310; 30; 20 MG/100ML; MG/100ML; MG/100ML; MG/100ML
INJECTION, SOLUTION INTRAVENOUS CONTINUOUS
Status: DISCONTINUED | OUTPATIENT
Start: 2019-04-04 | End: 2019-04-04 | Stop reason: HOSPADM

## 2019-04-04 RX ORDER — ONDANSETRON 4 MG/1
4 TABLET, ORALLY DISINTEGRATING ORAL EVERY 30 MIN PRN
Status: DISCONTINUED | OUTPATIENT
Start: 2019-04-04 | End: 2019-04-04 | Stop reason: HOSPADM

## 2019-04-04 RX ORDER — BUPIVACAINE HYDROCHLORIDE AND EPINEPHRINE 5; 5 MG/ML; UG/ML
INJECTION, SOLUTION EPIDURAL; INTRACAUDAL; PERINEURAL PRN
Status: DISCONTINUED | OUTPATIENT
Start: 2019-04-04 | End: 2019-04-04 | Stop reason: HOSPADM

## 2019-04-04 RX ORDER — ONDANSETRON 2 MG/ML
4 INJECTION INTRAMUSCULAR; INTRAVENOUS EVERY 30 MIN PRN
Status: DISCONTINUED | OUTPATIENT
Start: 2019-04-04 | End: 2019-04-04 | Stop reason: HOSPADM

## 2019-04-04 RX ORDER — PROPOFOL 10 MG/ML
INJECTION, EMULSION INTRAVENOUS CONTINUOUS PRN
Status: DISCONTINUED | OUTPATIENT
Start: 2019-04-04 | End: 2019-04-04

## 2019-04-04 RX ORDER — IBUPROFEN 600 MG/1
600 TABLET, FILM COATED ORAL EVERY 6 HOURS PRN
Qty: 30 TABLET | Refills: 0 | Status: SHIPPED | OUTPATIENT
Start: 2019-04-04 | End: 2019-04-16

## 2019-04-04 RX ORDER — ACETAMINOPHEN 325 MG/1
650 TABLET ORAL
Status: DISCONTINUED | OUTPATIENT
Start: 2019-04-04 | End: 2019-04-04 | Stop reason: HOSPADM

## 2019-04-04 RX ORDER — LIDOCAINE 40 MG/G
CREAM TOPICAL
Status: DISCONTINUED | OUTPATIENT
Start: 2019-04-04 | End: 2019-04-04 | Stop reason: HOSPADM

## 2019-04-04 RX ORDER — CEFAZOLIN SODIUM 2 G/100ML
2 INJECTION, SOLUTION INTRAVENOUS
Status: COMPLETED | OUTPATIENT
Start: 2019-04-04 | End: 2019-04-04

## 2019-04-04 RX ORDER — FENTANYL CITRATE 50 UG/ML
25-50 INJECTION, SOLUTION INTRAMUSCULAR; INTRAVENOUS
Status: DISCONTINUED | OUTPATIENT
Start: 2019-04-04 | End: 2019-04-04 | Stop reason: HOSPADM

## 2019-04-04 RX ORDER — AMOXICILLIN 250 MG
1-2 CAPSULE ORAL 2 TIMES DAILY
Qty: 30 TABLET | Refills: 0 | Status: SHIPPED | OUTPATIENT
Start: 2019-04-04 | End: 2019-04-16

## 2019-04-04 RX ADMIN — SODIUM CHLORIDE, POTASSIUM CHLORIDE, SODIUM LACTATE AND CALCIUM CHLORIDE: 600; 310; 30; 20 INJECTION, SOLUTION INTRAVENOUS at 09:16

## 2019-04-04 RX ADMIN — PROPOFOL 50 MG: 10 INJECTION, EMULSION INTRAVENOUS at 09:51

## 2019-04-04 RX ADMIN — CEFAZOLIN SODIUM 2 G: 2 INJECTION, SOLUTION INTRAVENOUS at 09:48

## 2019-04-04 RX ADMIN — LIDOCAINE HYDROCHLORIDE 0.1 ML: 10 INJECTION, SOLUTION EPIDURAL; INFILTRATION; INTRACAUDAL; PERINEURAL at 09:16

## 2019-04-04 RX ADMIN — PROPOFOL 140 MCG/KG/MIN: 10 INJECTION, EMULSION INTRAVENOUS at 09:47

## 2019-04-04 ASSESSMENT — COPD QUESTIONNAIRES
CAT_SEVERITY: MILD
COPD: 1

## 2019-04-04 ASSESSMENT — LIFESTYLE VARIABLES: TOBACCO_USE: 1

## 2019-04-04 NOTE — DISCHARGE INSTRUCTIONS
[facility]  Adult Discharge Orders & Instructions      For 24 hours after surgery:  1. Get plenty of rest.  A responsible adult must stay with you for at least 24 hours after you leave the hospital.   2. You may feel lightheaded.  IF so, sit for a few minutes before standing.  Have someone help you get up.   3. You may have a slight fever. Call the doctor if your fever is over 101 F (38.3 C) (taken under the tongue) or lasts longer than 24 hours.  4. You may have a dry mouth, a sore throat, muscle aches or trouble sleeping.  These should go away after 24 hours.  5. Do not make important or legal decisions.                                                                                                                                                                           To contact a doctor, call    [phone]______________

## 2019-04-04 NOTE — OP NOTE
DATE OF SERVICE: 4/4/2019     SURGEON:  Andrew Sahni MD    PREOPERATIVE DIAGNOSIS   Right carpal tunnel syndrome.     POSTOPERATIVE DIAGNOSIS   Right carpal tunnel syndrome.     PROCEDURE   Right carpaltunnel release.     ANESTHESIA   Monitor Anesthesia Care    ASSISTANT   Circulator: Lesli Garber RN  Scrub Person: Anyi Angel  First Assistant: Roya Sahni RN  Pre-Op Nurse: Denia Lowe RN    INDICATION FOR THE PROCEDURE   Patient is a 58 year old female who has a long history of numbness in the both hands. Pt had the left done and is very happy. She has come continued symptoms in the right hand, worse at night. EMG confirmed a bilateral carpal tunnel syndrome.     DESCRIPTION OF PROCEDURE   After adequate regional anesthesia, the patient was prepped and draped in   usual sterile fashion. Then 0.5% Marcaine plain was infiltrated in the skin   and subcutaneous tissue about the thenar eminence skin crease. A curvilinear   incision was made through the thenar eminence skin crease, carried down   through thesuperficial palmar fascia. This exposed the transverse carpal   ligament, which was divided from a distal to proximal direction, taking care   to stay on the ulnar side of the median nerve. This was carried into the  wrist. The 's little finger passed easily alongside the nerve without   restriction. Subcutaneous tissue was reapproximated with interrupted 3-0   Vicryl sutures, the skin closed with a running 3-0 nylon suture. A bulky hand   dressing was applied, and the patient taken to day surgery in stable   condition.     Andrew Sahni MD on 4/4/2019 at 1:16 PM

## 2019-04-04 NOTE — ANESTHESIA PREPROCEDURE EVALUATION
Anesthesia Pre-Procedure Evaluation    Patient: Lisy Telles   MRN: [id number] : 1960          Preoperative Diagnosis: bilateral carpal tunnel    Procedure(s):  RELEASE CARPAL TUNNEL    Past Medical History:   Diagnosis Date     Age-related osteoporosis with current pathological fracture with routine healing     2017     Anxiety disorder     No Comments Provided     Appendicitis     No Comments Provided     Benign neoplasm of connective and other soft tissue, unspecified     Right Breast     Brachial plexus disorders     2016     Chronic obstructive pulmonary disease (H)     No Comments Provided     Essential (primary) hypertension     No Comments Provided     Major depressive disorder, single episode     Depression with insomnia and tiredness     Other displaced fracture of upper end of left humerus, subsequent encounter for fracture with routine healing     2016     Other fracture of unspecified lower leg, initial encounter for closed fracture     Fracture, right ankle     Personal history of other medical treatment (CODE)      4, Para 4-0-0-4     Pure hypercholesterolemia     No Comments Provided     Rectal polyp     Hyperplastic polyp times three noted on colonoscopy 2007     Tobacco use     No Comments Provided     Uncomplicated alcohol dependence (H)     No Comments Provided     Past Surgical History:   Procedure Laterality Date     BRONCHOSCOPY DIAGNOSTIC  2018    MAC infection     CLOSED REDUCTION ANKLE      07,Open reduction/internal fixation, right ankle fracture 07.     COLONOSCOPY      ,screening for family history,  follow-up recommended in five years.     COLONOSCOPY      ,Colonoscopy 2007.  Follow-up recommended in five years.     COLONOSCOPY  2013,Normal, hemorrhoids - follow up 5 years for family history of colon cnacer     HYSTERECTOMY TOTAL ABDOMINAL      2007,Laparoscopic supracervical hysterectomy and  bilateral salpingo-oophorectomy for bleeding fibroid     LAPAROSCOPIC BYPASS GASTRIC      2007     LAPAROSCOPIC TUBAL LIGATION      1985     LOBECTOMY LUNG  08/07/2018    VATS - LOBECTOMY UPPER LUNG, [place], Dr Kohler     LUMPECTOMY BREAST      Lumpectomy of the right breast     RELEASE CARPAL TUNNEL Left 2/21/2019    Procedure: RELEASE CARPAL TUNNEL;  Surgeon: Andrew Sahni MD;  Location: [facility] OR     SKIN GRAFT, EACH ADDN 100SQCM  03/08/2007    Skin grafting post right ankle fracture       Anesthesia Evaluation     . Pt has had prior anesthetic. Type: General and MAC    No history of anesthetic complications          ROS/MED HX    ENT/Pulmonary: Comment: S/P lobectomy     (+)tobacco use, mild COPD, , . .    Neurologic: Comment: Hx Alcoholism, TBI    (+)delerium resolved Yes,     Cardiovascular:     (+) Dyslipidemia, hypertension----. : . . CHAMBERLAIN, . :. .       METS/Exercise Tolerance:     Hematologic:  - neg hematologic  ROS       Musculoskeletal: Comment: CTS  (+) arthritis, , , -       GI/Hepatic:  - neg GI/hepatic ROS       Renal/Genitourinary:  - ROS Renal section negative       Endo:  - neg endo ROS       Psychiatric:     (+) psychiatric history anxiety and depression      Infectious Disease:  - neg infectious disease ROS       Malignancy:      - no malignancy   Other:    - neg other ROS                        Physical Exam  Normal systems: cardiovascular, pulmonary and dental    Airway   Mallampati: II  TM distance: >3 FB  Neck ROM: full    Dental     Cardiovascular   Rhythm and rate: regular and normal      Pulmonary    breath sounds clear to auscultation            Lab Results   Component Value Date    WBC 3.5 (L) 06/29/2018    HGB 12.1 06/29/2018    HCT 37.8 06/29/2018     06/29/2018     09/11/2017    POTASSIUM 4.0 09/11/2017    CHLORIDE 100 09/11/2017    CO2 25 09/11/2017    BUN 5 (L) 09/11/2017    CR 0.68 06/29/2018    GLC 56 (L) 09/11/2017    SYED 8.8 09/11/2017    ALBUMIN 3.2 (L) 09/11/2017  "   PROTTOTAL 6.5 09/11/2017    ALT 10 06/29/2018    AST 18 06/29/2018    ALKPHOS 127 (H) 09/11/2017    BILITOTAL 0.5 09/11/2017    KATERYNA 17 09/04/2015    INR 0.9 09/11/2017       Preop Vitals  BP Readings from Last 3 Encounters:   04/04/19 (!) 154/100   03/27/19 124/70   03/06/19 128/84    Pulse Readings from Last 3 Encounters:   04/04/19 79   03/27/19 97   03/06/19 72      Resp Readings from Last 3 Encounters:   04/04/19 18   03/27/19 18   02/21/19 16    SpO2 Readings from Last 3 Encounters:   04/04/19 98%   02/21/19 96%      Temp Readings from Last 1 Encounters:   04/04/19 98  F (36.7  C) (Tympanic)    Ht Readings from Last 1 Encounters:   03/06/19 1.626 m (5' 4\")      Wt Readings from Last 1 Encounters:   04/04/19 56.7 kg (125 lb)    Estimated body mass index is 21.46 kg/m  as calculated from the following:    Height as of 3/6/19: 1.626 m (5' 4\").    Weight as of an earlier encounter on 4/4/19: 56.7 kg (125 lb).       Anesthesia Plan      History & Physical Review      ASA Status:  3 .    NPO Status:  > 8 hours    Plan for MAC   PONV prophylaxis:  Ondansetron (or other 5HT-3)       Postoperative Care  Postoperative pain management:  IV analgesics and Multi-modal analgesia.      Consents  Anesthetic plan, risks, benefits and alternatives discussed with:  Patient..                   KELI Lockhart CRNA  "

## 2019-04-04 NOTE — ANESTHESIA CARE TRANSFER NOTE
Patient: Lisy Telles    Procedure(s):  Open Carpal Tunnel Release    Diagnosis: W/C Carpal Tunnel Syndrome  Diagnosis Additional Information: No value filed.    Anesthesia Type:   MAC     Note:  Airway :Room Air  Patient transferred to:Phase II  Handoff Report: Identifed the Patient, Identified the Reponsible Provider, Reviewed the pertinent medical history, Discussed the surgical course, Reviewed Intra-OP anesthesia mangement and issues during anesthesia, Set expectations for post-procedure period and Allowed opportunity for questions and acknowledgement of understanding      Vitals: (Last set prior to Anesthesia Care Transfer)    CRNA VITALS  4/4/2019 1001 - 4/4/2019 1036      4/4/2019             Resp Rate (set):  10                Electronically Signed By: KELI Allison CRNA  April 4, 2019  10:36 AM

## 2019-04-04 NOTE — ANESTHESIA POSTPROCEDURE EVALUATION
Patient: Lisy Telles    Procedure(s):  Open Carpal Tunnel Release    Diagnosis:W/C Carpal Tunnel Syndrome  Diagnosis Additional Information: No value filed.    Anesthesia Type:  MAC    Note:  Anesthesia Post Evaluation    Patient location during evaluation: Phase 2  Patient participation: Able to fully participate in evaluation  Level of consciousness: awake and alert  Pain management: adequate  Cardiovascular status: blood pressure returned to baseline, acceptable and hemodynamically stable  Respiratory status: acceptable  Hydration status: acceptable  PONV: none     Anesthetic complications: None          Last vitals:  Vitals:    04/04/19 0857 04/04/19 1037   BP: (!) 154/100 121/72   Pulse: 79    Resp: 18 16   Temp: 98  F (36.7  C) 97.5  F (36.4  C)   SpO2: 98% 96%         Electronically Signed By: KELI Allison CRNA  April 4, 2019  10:52 AM

## 2019-04-04 NOTE — INTERVAL H&P NOTE
I saw and examined Lisy Telles.  I have reviewed the history and physical and find no changes to the patient's medical status or condition with the exceptions noted below.     Andrew Sahni   9:18 AM 4/4/2019

## 2019-04-15 ENCOUNTER — HOSPITAL ENCOUNTER (OUTPATIENT)
Dept: OCCUPATIONAL THERAPY | Facility: OTHER | Age: 59
Setting detail: THERAPIES SERIES
End: 2019-04-15
Attending: FAMILY MEDICINE
Payer: OTHER MISCELLANEOUS

## 2019-04-15 PROCEDURE — 97110 THERAPEUTIC EXERCISES: CPT | Mod: GO

## 2019-04-15 PROCEDURE — 97035 APP MDLTY 1+ULTRASOUND EA 15: CPT | Mod: GO

## 2019-04-15 PROCEDURE — 97140 MANUAL THERAPY 1/> REGIONS: CPT | Mod: GO

## 2019-04-16 ENCOUNTER — OFFICE VISIT (OUTPATIENT)
Dept: SURGERY | Facility: OTHER | Age: 59
End: 2019-04-16
Attending: SURGERY

## 2019-04-16 VITALS
RESPIRATION RATE: 20 BRPM | BODY MASS INDEX: 21.77 KG/M2 | DIASTOLIC BLOOD PRESSURE: 88 MMHG | TEMPERATURE: 98.4 F | WEIGHT: 126.8 LBS | SYSTOLIC BLOOD PRESSURE: 130 MMHG | HEART RATE: 88 BPM

## 2019-04-16 DIAGNOSIS — Z98.890 S/P BILATERAL CARPAL TUNNEL RELEASE: Primary | ICD-10-CM

## 2019-04-16 PROCEDURE — 99024 POSTOP FOLLOW-UP VISIT: CPT | Performed by: SURGERY

## 2019-04-16 ASSESSMENT — PAIN SCALES - GENERAL: PAINLEVEL: NO PAIN (0)

## 2019-04-16 NOTE — NURSING NOTE
"Chief Complaint   Patient presents with     Surgical Followup     bilateral carpal tunnel       Initial /88 (BP Location: Right arm, Patient Position: Sitting, Cuff Size: Adult Regular)   Pulse 88   Temp 98.4  F (36.9  C) (Temporal)   Resp 20   Wt 57.5 kg (126 lb 12.8 oz)   Breastfeeding? No   BMI 21.77 kg/m   Estimated body mass index is 21.77 kg/m  as calculated from the following:    Height as of 3/6/19: 1.626 m (5' 4\").    Weight as of this encounter: 57.5 kg (126 lb 12.8 oz).  Medication Reconciliation: complete    Blaire Robledo LPN  "

## 2019-04-22 ENCOUNTER — TELEPHONE (OUTPATIENT)
Dept: SURGERY | Facility: OTHER | Age: 59
End: 2019-04-22

## 2019-04-22 NOTE — TELEPHONE ENCOUNTER
Patient has question concerning he back to work schedule.  Her work comp paperwork states something different than what she was verbalized by Dr. Sahni.  Will discuss this with Dr. Sahni and return call to patient.  Maria Teresa Hudson LPN..........4/22/2019  4:48 PM

## 2019-04-23 ENCOUNTER — HOSPITAL ENCOUNTER (OUTPATIENT)
Dept: OCCUPATIONAL THERAPY | Facility: OTHER | Age: 59
Setting detail: THERAPIES SERIES
End: 2019-04-23
Attending: FAMILY MEDICINE
Payer: OTHER MISCELLANEOUS

## 2019-04-23 PROCEDURE — 97140 MANUAL THERAPY 1/> REGIONS: CPT | Mod: GO

## 2019-04-23 PROCEDURE — 97035 APP MDLTY 1+ULTRASOUND EA 15: CPT | Mod: GO

## 2019-04-23 PROCEDURE — 97110 THERAPEUTIC EXERCISES: CPT | Mod: GO

## 2019-04-23 NOTE — TELEPHONE ENCOUNTER
When did you verbally tell her she could return to work.  Maria Teresa Hudson LPN..........4/23/2019  1:45 PM

## 2019-04-24 NOTE — TELEPHONE ENCOUNTER
I am okay with light duty as tolerated with the right and unrestricted with the left. I can revise the work release as needed.     Andrew Sahni MD on 4/24/2019 at 11:58 AM

## 2019-05-14 ENCOUNTER — OFFICE VISIT (OUTPATIENT)
Dept: SURGERY | Facility: OTHER | Age: 59
End: 2019-05-14
Attending: SPECIALIST

## 2019-05-14 VITALS
BODY MASS INDEX: 21.22 KG/M2 | DIASTOLIC BLOOD PRESSURE: 74 MMHG | WEIGHT: 123.6 LBS | HEART RATE: 83 BPM | SYSTOLIC BLOOD PRESSURE: 138 MMHG | TEMPERATURE: 98.3 F

## 2019-05-14 DIAGNOSIS — G56.03 BILATERAL CARPAL TUNNEL SYNDROME: Primary | ICD-10-CM

## 2019-05-14 PROCEDURE — 99024 POSTOP FOLLOW-UP VISIT: CPT | Performed by: SURGERY

## 2019-05-14 ASSESSMENT — PAIN SCALES - GENERAL: PAINLEVEL: NO PAIN (0)

## 2019-05-14 NOTE — LETTER
[facility]  [address]  [place] [address]  Phone: [phone]  Fax: [phone]    May 14, 2019        Lisy [facility]elles  [address] [facility] [facility] [id number]          To whom it may concern:    RE: Lisy Telles    Patient may return to work 5/14/2019  with the following:  No restrictions.     Please contact me for questions or concerns.      Sincerely,        Andrew Sahni MD

## 2019-05-15 NOTE — PROGRESS NOTES
Patient presents for post surgical visit after left and subsequently right carpal tunnel release. Patient has done well. Pt is doing well.       /74 (BP Location: Right arm, Patient Position: Sitting, Cuff Size: Adult Regular)   Pulse 83   Temp 98.3  F (36.8  C) (Temporal)   Wt 56.1 kg (123 lb 9.6 oz)   Breastfeeding? No   BMI 21.22 kg/m        General: NAD, pleasant and cooperative with exam and interview.  Right hand: healing incision. No sign of infection. No pain with palpation.   Left hand: Healed incision, hypothenar wasting noted.   Psychiatry: awake, alert and oriented. Appropriate affect.    Assessment/Plan:  58 year old female s/p bilateral carpal tunnel release. Patient will call with questions or concerns.    - Pt is cleared to be unrestricted.     Andrew Sahni MD on 5/15/2019 at 8:22 AM

## 2019-08-20 NOTE — ADDENDUM NOTE
Encounter addended by: Judi Mancia OTR on: 8/20/2019 1:06 PM   Actions taken: Pend clinical note, Flowsheet data copied forward, Sign clinical note, Flowsheet accepted, Episode resolved

## 2019-08-20 NOTE — PROGRESS NOTES
Outpatient Occupational Therapy Discharge Note     Patient: Lisy Telles  : 1960    Beginning/End Dates of Reporting Period:  2019 to 2019    Referring Provider: Dr. Andrew Sahni    Therapy Diagnosis: S/P bilateral open CTR, decline in self-cares    Client Self Report: Supposedly she will be going back to work on 19 light duty.  She is unsure if she will be able to do it.  She reports that she is able to do more daily tasks with both hands.       Objective Measurements:    Pain Measure   19: L- 3/10 right around incision scar; R- not much pain at all.  Numbness has pretty much gone away.       Goals:     Goal Identifier home management   Goal Description Patient will report the ability to ring out her mop using both hands with no difficulty. currently mild   Target Date 19   Date Met      Progress: Not met. Unable to re-assess, patient only attended 3 OT session.     Goal Identifier Self cares   Goal Description Patient will report the ability to pullup her pants using with no difficulty. currently mild   Target Date 19   Date Met      Progress: Not met. Unable to re-assess, patient only attended 3 OT session.     Goal Identifier lifting   Goal Description patient will report the ability to lift/carry heavy objects over 10# with both hands and no difficulty   Target Date 19   Date Met      Progress: Not met. Unable to re-assess, patient only attended 3 OT session.     Progress Toward Goals:   Progress limited due to patient only attending initial evaluation plus 2 additional occupational therapy sessions.  She was reporting decreased numbness, decreased pain and increased functional use of BUEs.     Plan:  Discharge from therapy.    Discharge:    Reason for Discharge: Patient has failed to schedule further appointments.    Equipment Issued: Dycem, scar gel pads, resistance sponges, HEP    Discharge Plan: Patient to continue home program.

## 2019-10-07 ENCOUNTER — OFFICE VISIT (OUTPATIENT)
Dept: FAMILY MEDICINE | Facility: OTHER | Age: 59
End: 2019-10-07
Attending: NURSE PRACTITIONER
Payer: COMMERCIAL

## 2019-10-07 ENCOUNTER — HOSPITAL ENCOUNTER (OUTPATIENT)
Dept: GENERAL RADIOLOGY | Facility: OTHER | Age: 59
Discharge: HOME | End: 2019-10-07
Attending: NURSE PRACTITIONER | Admitting: NURSE PRACTITIONER
Payer: COMMERCIAL

## 2019-10-07 VITALS
TEMPERATURE: 98.5 F | OXYGEN SATURATION: 99 % | SYSTOLIC BLOOD PRESSURE: 130 MMHG | DIASTOLIC BLOOD PRESSURE: 80 MMHG | WEIGHT: 111.4 LBS | HEIGHT: 64 IN | HEART RATE: 89 BPM | RESPIRATION RATE: 18 BRPM | BODY MASS INDEX: 19.02 KG/M2

## 2019-10-07 DIAGNOSIS — R05.3 PERSISTENT COUGH FOR 3 WEEKS OR LONGER: ICD-10-CM

## 2019-10-07 DIAGNOSIS — J44.1 COPD EXACERBATION (H): Primary | ICD-10-CM

## 2019-10-07 DIAGNOSIS — R06.02 SHORTNESS OF BREATH: ICD-10-CM

## 2019-10-07 LAB
BASOPHILS # BLD AUTO: 0.1 10E9/L (ref 0–0.2)
BASOPHILS NFR BLD AUTO: 0.7 %
DIFFERENTIAL METHOD BLD: ABNORMAL
EOSINOPHIL # BLD AUTO: 0.1 10E9/L (ref 0–0.7)
EOSINOPHIL NFR BLD AUTO: 1.1 %
ERYTHROCYTE [DISTWIDTH] IN BLOOD BY AUTOMATED COUNT: 16.4 % (ref 10–15)
HCT VFR BLD AUTO: 42.9 % (ref 35–47)
HGB BLD-MCNC: 14.3 G/DL (ref 11.7–15.7)
IMM GRANULOCYTES # BLD: 0 10E9/L (ref 0–0.4)
IMM GRANULOCYTES NFR BLD: 0.3 %
LYMPHOCYTES # BLD AUTO: 1.2 10E9/L (ref 0.8–5.3)
LYMPHOCYTES NFR BLD AUTO: 16.6 %
MCH RBC QN AUTO: 32.7 PG (ref 26.5–33)
MCHC RBC AUTO-ENTMCNC: 33.3 G/DL (ref 31.5–36.5)
MCV RBC AUTO: 98 FL (ref 78–100)
MONOCYTES # BLD AUTO: 0.4 10E9/L (ref 0–1.3)
MONOCYTES NFR BLD AUTO: 5.9 %
NEUTROPHILS # BLD AUTO: 5.4 10E9/L (ref 1.6–8.3)
NEUTROPHILS NFR BLD AUTO: 75.4 %
PLATELET # BLD AUTO: 199 10E9/L (ref 150–450)
RBC # BLD AUTO: 4.37 10E12/L (ref 3.8–5.2)
WBC # BLD AUTO: 7.2 10E9/L (ref 4–11)

## 2019-10-07 PROCEDURE — 99214 OFFICE O/P EST MOD 30 MIN: CPT | Performed by: NURSE PRACTITIONER

## 2019-10-07 PROCEDURE — 85025 COMPLETE CBC W/AUTO DIFF WBC: CPT | Mod: ZL | Performed by: NURSE PRACTITIONER

## 2019-10-07 PROCEDURE — 71046 X-RAY EXAM CHEST 2 VIEWS: CPT

## 2019-10-07 PROCEDURE — 36415 COLL VENOUS BLD VENIPUNCTURE: CPT | Mod: ZL | Performed by: NURSE PRACTITIONER

## 2019-10-07 RX ORDER — PREDNISONE 20 MG/1
40 TABLET ORAL DAILY
Qty: 10 TABLET | Refills: 0 | Status: SHIPPED | OUTPATIENT
Start: 2019-10-07 | End: 2020-01-16

## 2019-10-07 RX ORDER — AZITHROMYCIN 250 MG/1
TABLET, FILM COATED ORAL
Qty: 6 TABLET | Refills: 0 | Status: SHIPPED | OUTPATIENT
Start: 2019-10-07 | End: 2020-01-16

## 2019-10-07 ASSESSMENT — PAIN SCALES - GENERAL: PAINLEVEL: MILD PAIN (3)

## 2019-10-07 ASSESSMENT — MIFFLIN-ST. JEOR: SCORE: 1070.31

## 2019-10-07 NOTE — NURSING NOTE
"Chief Complaint   Patient presents with     Cough     Patient is here for a cough, Shortness of breath, fatigue and hot/cold sweats that started about a month ago. Patient states she has been using her nebulizer and inhalers with little relief. Patient states that the sodium chloride neb helps to loosen things up.     Initial /80   Pulse 89   Temp 98.5  F (36.9  C) (Tympanic)   Resp 18   Ht 1.626 m (5' 4\")   Wt 50.5 kg (111 lb 6.4 oz)   SpO2 99%   BMI 19.12 kg/m   Estimated body mass index is 19.12 kg/m  as calculated from the following:    Height as of this encounter: 1.626 m (5' 4\").    Weight as of this encounter: 50.5 kg (111 lb 6.4 oz).  Medication Reconciliation: complete    Hanny Loomis, MARIAM  "

## 2019-10-07 NOTE — PROGRESS NOTES
HPI:    Lisy Telles is a 59 year old female  who presents to clinic today for cough and shortness of breath.    Started with cough and chest congestion about a month ago.  Cough has become looser and mildly productive.  Phlegm is very sticky, creamy colored.  Mild burning in upper chest.  tightness in chest.  Shortness of breath for the past month with worsening.  Fatigued for the past couple of weeks.  Alternating hot and cold flashes for the past month, occasional sweats.  Occasional mild headaches.  No body aches.  Decreased appetite.    Using Saline nebulizer BID and Albuterol inhaler every 4 hours and QID on nebulizer.     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  Hx of COPD.          Past Medical History:   Diagnosis Date     Age-related osteoporosis with current pathological fracture with routine healing     2017     Anxiety disorder     No Comments Provided     Appendicitis     No Comments Provided     Benign neoplasm of connective and other soft tissue, unspecified     Right Breast     Bilateral carpal tunnel syndrome      Brachial plexus disorders     2016     Chronic obstructive pulmonary disease (H)     No Comments Provided     Essential (primary) hypertension     No Comments Provided     Major depressive disorder, single episode     Depression with insomnia and tiredness     Other displaced fracture of upper end of left humerus, subsequent encounter for fracture with routine healing     2016     Other fracture of unspecified lower leg, initial encounter for closed fracture     Fracture, right ankle     Personal history of other medical treatment (CODE)      4, Para 4-0-0-4     Pure hypercholesterolemia     No Comments Provided     Rectal polyp     Hyperplastic polyp times three noted on colonoscopy 2007     Tobacco use     No Comments Provided     Uncomplicated alcohol dependence (H)     No Comments Provided     Past Surgical History:    Procedure Laterality Date     BRONCHOSCOPY DIAGNOSTIC  08/07/2018    MAC infection     CLOSED REDUCTION ANKLE      12/26/07,Open reduction/internal fixation, right ankle fracture 12/26/07.     COLONOSCOPY      2007,screening for family history,  follow-up recommended in five years.     COLONOSCOPY      2007,Colonoscopy April 2007.  Follow-up recommended in five years.     COLONOSCOPY  02/18/2013 2/2013,Normal, hemorrhoids - follow up 5 years for family history of colon cnacer     HYSTERECTOMY TOTAL ABDOMINAL      04/26/2007,Laparoscopic supracervical hysterectomy and bilateral salpingo-oophorectomy for bleeding fibroid     LAPAROSCOPIC BYPASS GASTRIC      2007     LAPAROSCOPIC TUBAL LIGATION      1985     LOBECTOMY LUNG  08/07/2018    [facility] - LOBECTOMY UPPER LUNG, [facility]     LUMPECTOMY BREAST      Lumpectomy of the right breast     RELEASE CARPAL TUNNEL Left 2/21/2019    Procedure: RELEASE CARPAL TUNNEL;  Surgeon: Andrew Sahni MD;  Location: [facility] OR     RELEASE CARPAL TUNNEL Right 4/4/2019    Procedure: Open Carpal Tunnel Release;  Surgeon: Andrew Sahni MD;  Location: [facility] OR     SKIN GRAFT, EACH ADDN 100SQCM  03/08/2007    Skin grafting post right ankle fracture     Social History     Tobacco Use     Smoking status: Light Tobacco Smoker     Packs/day: 0.00     Years: 43.00     Pack years: 0.00     Types: Cigarettes     Smokeless tobacco: Never Used     Tobacco comment: 5 per day   Substance Use Topics     Alcohol use: No     Alcohol/week: 0.0 standard drinks     Comment: Alcoholic Drinks/day: 4-5 beers and 1/2 pt vodka daily     Current Outpatient Medications   Medication Sig Dispense Refill     albuterol (2.5 MG/3ML) 0.083% neb solution Take 1 vial (2.5 mg) by nebulization 4 times daily 360 mL 3     albuterol (PROAIR HFA) 108 (90 Base) MCG/ACT inhaler Inhale 2 puffs into the lungs 4 times daily as needed 3 Inhaler 2     gabapentin (NEURONTIN) 400 MG capsule Take 2 capsules (800 mg) by mouth At  "Bedtime 180 capsule 3     hydrOXYzine (ATARAX) 10 MG tablet Take 1-2 tablets by mouth 3 times daily as needed       lisinopril (PRINIVIL/ZESTRIL) 5 MG tablet TAKE ONE TABLET BY MOUTH EVERY DAY 90 tablet 0     sodium chloride 3 % NEBU neb solution INHALE 3ML AS DIRECTED       traZODone (DESYREL) 150 MG tablet Take 1 tablet (150 mg) by mouth At Bedtime 90 tablet 3     venlafaxine (EFFEXOR-XR) 150 MG 24 hr capsule TAKE 2 CAPSULES BY MOUTH DAILY WITH FOOD 180 capsule 0     witch hazel-glycerin (TUCKS) pad Apply 1 applicator topically       alendronate (FOSAMAX) 70 MG tablet Take 1 tablet by mouth once a week in the morning. Take on empty stomach with full glass of water. Do not lie down for 1 hr.       celecoxib (CELEBREX) 50 MG capsule Take 1 capsule (50 mg) by mouth 2 times daily (Patient not taking: Reported on 10/7/2019) 180 capsule 3     ethambutol (MYAMBUTOL) 400 MG tablet Take 3 tablets by mouth daily  11     fluticasone (FLONASE) 50 MCG/ACT spray Spray 1-2 sprays into both nostrils daily As needed  To prevent allergy symptoms. Must be used regularly to be effective.       rifampin (RIFADIN) 300 MG capsule Take 2 capsules by mouth daily  11     No Known Allergies      Past medical history, past surgical history, current medications and allergies reviewed and accurate to the best of my knowledge.        ROS:  Refer to HPI    /80   Pulse 89   Temp 98.5  F (36.9  C) (Tympanic)   Resp 18   Ht 1.626 m (5' 4\")   Wt 50.5 kg (111 lb 6.4 oz)   SpO2 99%   BMI 19.12 kg/m      EXAM:  General Appearance: non toxic appearing adult female, appropriate appearance for age. No acute distress  Eyes: conjunctivae normal without erythema or irritation, no drainage or crusting, no eyelid swelling, pupils equal   Orophayrnx: moist mucous membranes, voice clear  Nose: no drainage or congestion noted  Neck: supple without adenopathy  Respiratory: normal chest wall and respirations.  Normal effort.  Clear to auscultation " bilaterally, no wheezing, crackles or rhonchi.  No increased work of breathing.  No cough appreciated, oxygen saturation 99%  Cardiac: RRR with no murmurs  Musculoskeletal:  Equal movement of bilateral upper extremities.  Equal movement of bilateral lower extremities.  Normal gait.    Psychological: normal affect, alert and pleasant      Labs:  Results for orders placed or performed in visit on 10/07/19   CBC and Differential   Result Value Ref Range    WBC 7.2 4.0 - 11.0 10e9/L    RBC Count 4.37 3.8 - 5.2 10e12/L    Hemoglobin 14.3 11.7 - 15.7 g/dL    Hematocrit 42.9 35.0 - 47.0 %    MCV 98 78 - 100 fl    MCH 32.7 26.5 - 33.0 pg    MCHC 33.3 31.5 - 36.5 g/dL    RDW 16.4 (H) 10.0 - 15.0 %    Platelet Count 199 150 - 450 10e9/L    Diff Method Automated Method     % Neutrophils 75.4 %    % Lymphocytes 16.6 %    % Monocytes 5.9 %    % Eosinophils 1.1 %    % Basophils 0.7 %    % Immature Granulocytes 0.3 %    Absolute Neutrophil 5.4 1.6 - 8.3 10e9/L    Absolute Lymphocytes 1.2 0.8 - 5.3 10e9/L    Absolute Monocytes 0.4 0.0 - 1.3 10e9/L    Absolute Eosinophils 0.1 0.0 - 0.7 10e9/L    Absolute Basophils 0.1 0.0 - 0.2 10e9/L    Abs Immature Granulocytes 0.0 0 - 0.4 10e9/L         Xray:  PROCEDURE:  XR CHEST 2 VW   HISTORY:  Persistent cough for 3 weeks or longer; Shortness of breath.  COMPARISON:  12/9/2018  FINDINGS:   The cardiac silhouette is normal in size. The pulmonary vasculature is  normal.  The lungs are clear. No pleural effusion or pneumothorax.                                                            IMPRESSION:  No acute cardiopulmonary disease.       DES CHURCH MD      ASSESSMENT/PLAN:    ICD-10-CM    1. COPD exacerbation (H) J44.1 azithromycin (ZITHROMAX) 250 MG tablet     predniSONE (DELTASONE) 20 MG tablet   2. Persistent cough for 3 weeks or longer R05 CBC and Differential     XR Chest 2 Views     CBC and Differential   3. Shortness of breath R06.02 CBC and Differential     XR Chest 2 Views     CBC  and Differential       CBC - normal WBC    CXR completed and reviewed, appears to have scarring of right lower lobe, no infiltrate appreciated, radiologist over read:  The cardiac silhouette is normal in size. The pulmonary vasculature isnormal.  The lungs are clear. No pleural effusion or pneumothorax.              Azithromycin daily x 5 days    Prednisone 40 mg daily x 5 days, take with food.    Continue albuterol inhaler and saline nebulizer PRN     May use OTC Mucinex PRN     Symptomatic treatment - Encouraged fluids, honey, elevation, humidifier, warm tea, lozenges, etc     May use over-the-counter Tylenol or ibuprofen PRN    Discussed warning signs/symptoms indicative of need to f/u    Follow up with PCP if symptoms persist or worsen or concerns      Disclaimer:  This note consists of words and symbols derived from keyboarding, dictation, or using voice recognition software. As a result, there may be errors in the script that have gone undetected. Please consider this when interpreting information found in this note.

## 2020-01-04 DIAGNOSIS — G25.81 RESTLESS LEGS SYNDROME (RLS): ICD-10-CM

## 2020-01-07 RX ORDER — GABAPENTIN 400 MG/1
CAPSULE ORAL
Qty: 180 CAPSULE | Refills: 1 | Status: SHIPPED | OUTPATIENT
Start: 2020-01-07 | End: 2020-01-16

## 2020-01-07 NOTE — TELEPHONE ENCOUNTER
avi MARIE sent Rx request for the following:      GABAPENTIN 400MG CAPSULES  Sig: TAKE 2 CAPSULES BY MOUTH EVERY NIGHT AT BEDTIME    Last Prescription Date:   1/4/2019  Last Fill Qty/Refills:         180, R-3    Last Office Visit:              2/6/2019   Future Office visit:           none    Routing refill request to provider for review/approval because:  Drug not on the [facility], [facility] or [facility] refill protocol or controlled substance  Kofi Bear RN, BSN  ....................  1/7/2020   8:10 AM

## 2020-01-16 ENCOUNTER — OFFICE VISIT (OUTPATIENT)
Dept: FAMILY MEDICINE | Facility: OTHER | Age: 60
End: 2020-01-16
Attending: FAMILY MEDICINE
Payer: COMMERCIAL

## 2020-01-16 VITALS
DIASTOLIC BLOOD PRESSURE: 96 MMHG | HEART RATE: 92 BPM | SYSTOLIC BLOOD PRESSURE: 136 MMHG | RESPIRATION RATE: 18 BRPM | TEMPERATURE: 97.6 F | BODY MASS INDEX: 19.4 KG/M2 | WEIGHT: 113 LBS | OXYGEN SATURATION: 98 %

## 2020-01-16 DIAGNOSIS — I10 ESSENTIAL HYPERTENSION: ICD-10-CM

## 2020-01-16 DIAGNOSIS — R07.89 LEFT-SIDED CHEST WALL PAIN: Primary | ICD-10-CM

## 2020-01-16 DIAGNOSIS — F51.01 PRIMARY INSOMNIA: ICD-10-CM

## 2020-01-16 DIAGNOSIS — J44.89 COPD WITH CHRONIC BRONCHITIS AND EMPHYSEMA (H): ICD-10-CM

## 2020-01-16 DIAGNOSIS — J30.0 VASOMOTOR RHINITIS: ICD-10-CM

## 2020-01-16 DIAGNOSIS — Z13.220 LIPID SCREENING: ICD-10-CM

## 2020-01-16 DIAGNOSIS — G25.81 RESTLESS LEGS SYNDROME (RLS): ICD-10-CM

## 2020-01-16 DIAGNOSIS — F33.41 RECURRENT MAJOR DEPRESSION IN PARTIAL REMISSION (H): ICD-10-CM

## 2020-01-16 DIAGNOSIS — J43.9 COPD WITH CHRONIC BRONCHITIS AND EMPHYSEMA (H): ICD-10-CM

## 2020-01-16 DIAGNOSIS — Z87.891 PERSONAL HISTORY OF TOBACCO USE: ICD-10-CM

## 2020-01-16 DIAGNOSIS — D32.0 MENINGIOMA, CEREBRAL (H): ICD-10-CM

## 2020-01-16 LAB
ANION GAP SERPL CALCULATED.3IONS-SCNC: 6 MMOL/L (ref 3–14)
BUN SERPL-MCNC: 7 MG/DL (ref 7–25)
CALCIUM SERPL-MCNC: 8.7 MG/DL (ref 8.6–10.3)
CHLORIDE SERPL-SCNC: 102 MMOL/L (ref 98–107)
CHOLEST SERPL-MCNC: 167 MG/DL
CO2 SERPL-SCNC: 32 MMOL/L (ref 21–31)
CREAT SERPL-MCNC: 0.81 MG/DL (ref 0.6–1.2)
GFR SERPL CREATININE-BSD FRML MDRD: 73 ML/MIN/{1.73_M2}
GLUCOSE SERPL-MCNC: 80 MG/DL (ref 70–105)
HDLC SERPL-MCNC: 78 MG/DL (ref 23–92)
LDLC SERPL CALC-MCNC: 19 MG/DL
NONHDLC SERPL-MCNC: 89 MG/DL
POTASSIUM SERPL-SCNC: 4 MMOL/L (ref 3.5–5.1)
SODIUM SERPL-SCNC: 140 MMOL/L (ref 134–144)
TRIGL SERPL-MCNC: 348 MG/DL

## 2020-01-16 PROCEDURE — 80048 BASIC METABOLIC PNL TOTAL CA: CPT | Mod: ZL | Performed by: FAMILY MEDICINE

## 2020-01-16 PROCEDURE — 99215 OFFICE O/P EST HI 40 MIN: CPT | Performed by: FAMILY MEDICINE

## 2020-01-16 PROCEDURE — 36415 COLL VENOUS BLD VENIPUNCTURE: CPT | Mod: ZL | Performed by: FAMILY MEDICINE

## 2020-01-16 PROCEDURE — G0296 VISIT TO DETERM LDCT ELIG: HCPCS | Performed by: FAMILY MEDICINE

## 2020-01-16 PROCEDURE — 80061 LIPID PANEL: CPT | Mod: ZL | Performed by: FAMILY MEDICINE

## 2020-01-16 RX ORDER — LISINOPRIL 5 MG/1
5 TABLET ORAL DAILY
Qty: 90 TABLET | Refills: 4 | Status: SHIPPED | OUTPATIENT
Start: 2020-01-16 | End: 2021-02-16

## 2020-01-16 RX ORDER — AZELASTINE HCL 205.5 UG/1
2 SPRAY NASAL DAILY
Qty: 30 ML | Refills: 11 | Status: SHIPPED | OUTPATIENT
Start: 2020-01-16 | End: 2022-07-03

## 2020-01-16 RX ORDER — VENLAFAXINE HYDROCHLORIDE 150 MG/1
300 CAPSULE, EXTENDED RELEASE ORAL DAILY
Qty: 180 CAPSULE | Refills: 0 | Status: SHIPPED | OUTPATIENT
Start: 2020-01-16 | End: 2020-03-06

## 2020-01-16 RX ORDER — TRAZODONE HYDROCHLORIDE 150 MG/1
150 TABLET ORAL AT BEDTIME
Qty: 90 TABLET | Refills: 0 | Status: SHIPPED | OUTPATIENT
Start: 2020-01-16 | End: 2020-03-06

## 2020-01-16 RX ORDER — FLUTICASONE PROPIONATE 50 MCG
1 SPRAY, SUSPENSION (ML) NASAL DAILY
Qty: 18 G | Refills: 11 | Status: SHIPPED | OUTPATIENT
Start: 2020-01-16 | End: 2022-07-03

## 2020-01-16 RX ORDER — ALBUTEROL SULFATE 90 UG/1
2 AEROSOL, METERED RESPIRATORY (INHALATION) 4 TIMES DAILY PRN
Qty: 3 INHALER | Refills: 0 | Status: SHIPPED | OUTPATIENT
Start: 2020-01-16 | End: 2021-05-25

## 2020-01-16 RX ORDER — GABAPENTIN 400 MG/1
CAPSULE ORAL
Qty: 360 CAPSULE | Refills: 0 | Status: SHIPPED | OUTPATIENT
Start: 2020-01-16 | End: 2020-03-06

## 2020-01-16 ASSESSMENT — PAIN SCALES - GENERAL: PAINLEVEL: MODERATE PAIN (4)

## 2020-01-16 NOTE — PROGRESS NOTES
Nursing Notes:   Estefanía Sahni LPN  1/16/2020 10:57 AM  Sign at exiting of workspace  Pt presents to clinic today for left rib pain x 6 months.      Medication Reconciliation: complete  Estefanía Sahni LPN     SUBJECTIVE:  59 year old female presents for multiple concerns.    She has some consistent left-sided rib pain for several months. 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 Noticed some pain after the procedure, but now for the past several months it has become consistent.  Pain present over inferior ribs on the left side, not even in the area of the surgery.  Is not  more short of breath.  She does use inhalers for COPD.  Continues smoking. Smoked 44 years, 1 ppd.    Constant drainage in nose. Flonase did not help.     Reports meningioma monitored by neurosurgery in [place], last in 2014.  Says she was told to have every 5-year follow-up on this.    Requesting refills on Effexor and trazodone.  Placed on Effexor well in alcohol dependency treatment.  She is on a high dose of 300 mg daily.  Reports stability in this dosing.    Using gabapentin for restless legs.  It is effective at current dosing.      REVIEW OF SYSTEMS:    Pertinent items are noted in HPI.    Current Outpatient Medications   Medication Sig Dispense Refill     albuterol (2.5 MG/3ML) 0.083% neb solution Take 1 vial (2.5 mg) by nebulization 4 times daily 360 mL 3     albuterol (PROAIR HFA) 108 (90 Base) MCG/ACT inhaler Inhale 2 puffs into the lungs 4 times daily as needed 3 Inhaler 2     gabapentin (NEURONTIN) 400 MG capsule TAKE 2 CAPSULES BY MOUTH EVERY NIGHT AT BEDTIME 180 capsule 1     hydrOXYzine (ATARAX) 10 MG tablet Take 1-2 tablets by mouth 3 times daily as needed       lisinopril (PRINIVIL/ZESTRIL) 5 MG tablet TAKE ONE TABLET BY MOUTH EVERY DAY 90 tablet 0     sodium chloride 3 % NEBU neb solution INHALE 3ML AS DIRECTED       traZODone (DESYREL) 150 MG tablet Take 1 tablet (150 mg) by mouth At Bedtime  90 tablet 3     venlafaxine (EFFEXOR-XR) 150 MG 24 hr capsule TAKE 2 CAPSULES BY MOUTH DAILY WITH FOOD 180 capsule 0     witch hazel-glycerin (TUCKS) pad Apply 1 applicator topically       No Known Allergies    OBJECTIVE:  BP (!) 136/96   Pulse 92   Temp 97.6  F (36.4  C) (Tympanic)   Resp 18   Wt 51.3 kg (113 lb)   SpO2 98%   BMI 19.40 kg/m      EXAM:  General Appearance: Alert. No acute distress  Nose: No polyps noted.  Clear rhinorrhea  Chest/Respiratory Exam: Decreased breath sounds bilaterally, no wheezing or rails  Cardiovascular Exam: Regular rate and rhythm. S1, S2, no murmur, gallop, or rubs.  Musculoskeletal: Tender over inferior ribs on left lateral chest wall.   Abdomen: She is not tender in the left upper abdomen.  No splenomegaly.  Extremities: 2+ pedal pulses.  No lower extremity edema.  Psychiatric: Normal affect and mentation      Results for orders placed or performed in visit on 01/16/20   Lipid Panel     Status: Abnormal   Result Value Ref Range    Cholesterol 167 <200 mg/dL    Triglycerides 348 (H) <150 mg/dL    HDL Cholesterol 78 23 - 92 mg/dL    LDL Cholesterol Calculated 19 <100 mg/dL    Non HDL Cholesterol 89 <130 mg/dL   Basic Metabolic Panel     Status: Abnormal   Result Value Ref Range    Sodium 140 134 - 144 mmol/L    Potassium 4.0 3.5 - 5.1 mmol/L    Chloride 102 98 - 107 mmol/L    Carbon Dioxide 32 (H) 21 - 31 mmol/L    Anion Gap 6 3 - 14 mmol/L    Glucose 80 70 - 105 mg/dL    Urea Nitrogen 7 7 - 25 mg/dL    Creatinine 0.81 0.60 - 1.20 mg/dL    GFR Estimate 73 >60 mL/min/[1.73_m2]    GFR Estimate If Black 88 >60 mL/min/[1.73_m2]    Calcium 8.7 8.6 - 10.3 mg/dL      ASSESSMENT/PLAN:    ICD-10-CM    1. Left-sided chest wall pain R07.89    2. Personal history of tobacco use Z87.891 Prof fee: Shared Decisionmaking for Lung Cancer Screening     CT Chest Lung Cancer Scrn Low Dose wo     Okay for Smoking Cessation Study (PLUTO) to Contact Patient   3. Restless legs syndrome (RLS) G25.81  gabapentin (NEURONTIN) 400 MG capsule   4. Essential hypertension I10 lisinopril (PRINIVIL/ZESTRIL) 5 MG tablet     Basic Metabolic Panel     Basic Metabolic Panel   5. Lipid screening Z13.220 Lipid Panel     Lipid Panel   6. Primary insomnia F51.01 traZODone (DESYREL) 150 MG tablet   7. Recurrent major depression in partial remission (H) F33.41 venlafaxine (EFFEXOR-XR) 150 MG 24 hr capsule   8. Meningioma, cerebral (H) D32.0 MR Brain w/o & w Contrast     Basic Metabolic Panel     Basic Metabolic Panel   9. 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) J44.9 albuterol (PROAIR HFA) 108 (90 Base) MCG/ACT inhaler   10. Vasomotor rhinitis J30.0 azelastine (ASTEPRO) 0.15 % nasal spray     fluticasone (FLONASE) 50 MCG/ACT nasal spray       Chronic left-sided chest wall pain.  It certainly could relate to her lobectomy and some injury at that time.  Also could relate to having chronic cough with COPD and continued smoking.  Chest x-ray in October 2019 when these same symptoms existed is normal.  She is concerned about lung cancer as a cause for her pain because of the ongoing smoking.   We discussed CT scan for lung cancer.  She qualifies for screening based on 44 pack year smoking history.  I would not consider her current symptoms suggestive of cancer.  Placed order for low-dose annual CT scanning.    She is utilizing gabapentin to help with restless legs.  This could also help with chronic chest wall pain.  We discussed dosing and she can increase from current 800 mg per night up to 400 mg in the morning 800 mg at night for a week and then to 400 mg twice a day and 800 mg at night until follow-up.    Blood pressure elevated, but not on lisinopril currently.  Refilled lisinopril    Obtain lipids, statin does not appear indicated at this time based on risk score.  The 10-year ASCVD risk score (Maxx ESHA Jr., et al., 2013) is: 5.9%    Values used to calculate the score:      Age: 59 years      Sex: Female      Is  Non- : No      Diabetic: No      Tobacco smoker: Yes      Systolic Blood Pressure: 136 mmHg      Is BP treated: Yes      HDL Cholesterol: 78 mg/dL      Total Cholesterol: 167 mg/dL     Refilled trazodone and venlafaxine    Ordered brain MRI to follow-up on previously known meningioma.  Last MRI 2014.  If changed, then follow-up with neurosurgery.    Refilled albuterol for wheezing.    Can try combination of as a lasting nasal spray and fluticasone to help control vasomotor rhinitis.  Once controlled, could try and reduce off the fluticasone as this was previously not effective.  Discussed reasons why to use both as they were additionally beneficial together and studies.    Follow-up 2-3 months    Greater than 50% of this 40 minute appointment spent on counseling   Silvino Peace MD    This document was prepared using a combination of typing and voice generated software.  While every attempt was made for accuracy, spelling and grammatical errors may exist.     Lung Cancer Screening Shared Decision Making Visit     Lisy Telles is eligible for lung cancer screening on the basis of the information provided in my signed lung cancer screening order.     I have discussed with patient the risks and benefits of screening for lung cancer with low-dose CT.     The risks include:  radiation exposure: one low dose chest CT has as much ionizing radiation as about 15 chest x-rays or 6 months of background radiation living in [place]    false positives: 96% of positive findings/nodules are NOT cancer, but some might still require additional diagnostic evaluation, including biopsy  over-diagnosis: some slow growing cancers that might never have been clinically significant will be detected and treated unnecessarily     The benefit of early detection of lung cancer is contingent upon adherence to annual screening or more frequent follow up if indicated.     Furthermore, reaping the benefits of  screening requires Lisy MAUREEN Koki to be willing and physically able to undergo diagnostic procedures, if indicated. Although no specific guide is available for determining severity of comorbidities, it is reasonable to withhold screening in patients who have greater mortality risk from other diseases.     We did discuss that the only way to prevent lung cancer is to not smoke. Smoking cessation assistance was offered.    I did not offer risk estimation using a calculator such as this one:    ShouldIScreen

## 2020-01-16 NOTE — PATIENT INSTRUCTIONS
Ordered CT scan of the lungs.   Increase gabapentin by taking 400 mg in the morning and still 800 mg at night for 1 week, then 400 mg twice daily and 800 mg at night    Brain MRI ordered to assess meningioma    Refilled some medications    Follow-up by 2-3 months      Lung Cancer Screening   Frequently Asked Questions  If you are at high-risk for lung cancer, getting screened with low-dose computed tomography (LDCT) every year can help save your life. This handout offers answers to some of the most common questions about lung cancer screening. If you have other questions, please call [phone] ([phone]).     What is it?  Lung cancer screening uses special X-ray technology to create an image of your lung tissue. The exam is quick and easy and takes less than 10 seconds. We don t give you any medicine or use any needles. You can eat before and after the exam. You don t need to change your clothes as long as the clothing on your chest doesn t contain metal. But, you do need to be able to hold your breath for at least 6 seconds during the exam.    What is the goal of lung cancer screening?  The goal of lung cancer screening is to save lives. Many times, lung cancer is not found until a person starts having physical symptoms. Lung cancer screening can help detect lung cancer in the earliest stages when it may be easier to treat.    Who should be screened for lung cancer?  We suggest lung cancer screening for anyone who is at high-risk for lung cancer. You are in the high-risk group if you:      are between the ages of 55 and 79, and    have smoked at least 1 pack of cigarettes a day for 30 or more years, and    still smoke or have quit within the past 15 years.    However, if you have a new cough or shortness of breath, you should talk to your doctor before being screened.    Some national lung health advocacy groups also recommend screening for people ages 50 to 79 who have smoked an average of 1 pack of  cigarettes a day for 20 years. They must also have at least 1 other risk factor for lung cancer, not including exposure to secondhand smoke. Other risk factors are having had cancer in the past, emphysema, pulmonary fibrosis, COPD, a family history of lung cancer, or exposure to certain materials such as arsenic, asbestos, beryllium, cadmium, chromium, diesel fumes, nickel, radon or silica. Your care team can help you know if you have one of these risk factors.     Why does it matter if I have symptoms?  Certain symptoms can be a sign that you have a condition in your lungs that should be checked and treated by your doctor. These symptoms include fever, chest pain, a new or changing cough, shortness of breath that you have never felt before, coughing up blood or unexplained weight loss. Having any of these symptoms can greatly affect the results of lung cancer screening.       Should all smokers get an LDCT lung cancer screening exam?  It depends. Lung cancer screening is for a very specific group of men and women who have a history of heavy smoking over a long period of time (see  Who should be screened for lung cancer  above).  I am in the high-risk group, but have been diagnosed with cancer in the past. Is LDCT lung cancer screening right for me?  In some cases, you should not have LDCT lung screening, such as when your doctor is already following your cancer with CT scan studies. Your doctor will help you decide if LDCT lung screening is right for you.  Do I need to have a screening exam every year?  Yes. If you are in the high-risk group described earlier, you should get an LDCT lung cancer screening exam every year until you are 79, or are no longer willing or able to undergo screening and possible procedures to diagnose and treat lung cancer.  How effective is LDCT at preventing death from lung cancer?  Studies have shown that LDCT lung cancer screening can lower the risk of death from lung cancer by 20  percent in people who are at high-risk.  What are the risks?  There are some risks and limitations of LDCT lung cancer screening. We want to make sure you understand the risks and benefits, so please let us know if you have any questions. Your doctor may want to talk with you more about these risks.    Radiation exposure: As with any exam that uses radiation, there is a very small increased risk of cancer. The amount of radiation in LDCT is small--about the same amount a person would get from a mammogram. Your doctor orders the exam when he or she feels the potential benefits outweigh the risks.    False negatives: No test is perfect, including LDCT. It is possible that you may have a medical condition, including lung cancer, that is not found during your exam. This is called a false negative result.    False positives and more testing: LDCT very often finds something in the lung that could be cancer, but in fact is not. This is called a false positive result. False positive tests often cause anxiety. To make sure these findings are not cancer, you may need to have more tests. These tests will be done only if you give us permission. Sometimes patients need a treatment that can have side effects, such as a biopsy. For more information on false positives, see  What can I expect from the results?     Findings not related to lung cancer: Your LDCT exam also takes pictures of areas of your body next to your lungs. In a very small number of cases, the CT scan will show an abnormal finding in one of these areas, such as your kidneys, adrenal glands, liver or thyroid. This finding may not be serious, but you may need more tests. Your doctor can help you decide what other tests you may need, if any.  What can I expect from the results?  About 1 out of 4 LDCT exams will find something that may need more tests. Most of the time, these findings are lung nodules. Lung nodules are very small collections of tissue in the lung.  These nodules are very common, and the vast majority--more than 97 percent--are not cancer (benign). Most are normal lymph nodes or small areas of scarring from past infections.  But, if a small lung nodule is found to be cancer, the cancer can be cured more than 90 percent of the time. To know if the nodule is cancer, we may need to get more images before your next yearly screening exam. If the nodule has suspicious features (for example, it is large, has an odd shape or grows over time), we will refer you to a specialist for further testing.  Will my doctor also get the results?  Yes. Your doctor will get a copy of your results.  Is it okay to keep smoking now that there s a cancer screening exam?  No. Tobacco is one of the strongest cancer-causing agents. It causes not only lung cancer, but other cancers and cardiovascular (heart) diseases as well. The damage caused by smoking builds over time. This means that the longer you smoke, the higher your risk of disease. While it is never too late to quit, the sooner you quit, the better.  Where can I find help to quit smoking?  The best way to prevent lung cancer is to stop smoking. If you have already quit smoking, congratulations and keep it up! For help on quitting smoking, please call Drive at 9-193-647-HZDW ([phone]) or the American [employer] Society at 1-908.650.7911 to find local resources near you.  One-on-one health coaching:  If you d prefer to work individually with a health care provider on tobacco cessation, we offer:      Medication Therapy Management:  Our specially trained pharmacists work closely with you and your doctor to help you quit smoking.  Call [phone] or [phone] (toll free).     Can Do: Health coaching offered by [facility].  [email or web address]

## 2020-01-16 NOTE — NURSING NOTE
Pt presents to clinic today for left rib pain x 6 months.      Medication Reconciliation: complete  Estefanía Sahni LPN

## 2020-01-17 ENCOUNTER — TELEPHONE (OUTPATIENT)
Dept: FAMILY MEDICINE | Facility: OTHER | Age: 60
End: 2020-01-17

## 2020-01-17 ENCOUNTER — HOSPITAL ENCOUNTER (OUTPATIENT)
Dept: MRI IMAGING | Facility: OTHER | Age: 60
Discharge: HOME | End: 2020-01-17
Attending: FAMILY MEDICINE | Admitting: FAMILY MEDICINE
Payer: COMMERCIAL

## 2020-01-17 DIAGNOSIS — D32.0 MENINGIOMA, CEREBRAL (H): ICD-10-CM

## 2020-01-17 PROCEDURE — 70553 MRI BRAIN STEM W/O & W/DYE: CPT

## 2020-01-17 PROCEDURE — 25500064 ZZH RX 255 OP 636: Performed by: FAMILY MEDICINE

## 2020-01-17 PROCEDURE — A9575 INJ GADOTERATE MEGLUMI 0.1ML: HCPCS | Performed by: FAMILY MEDICINE

## 2020-01-17 RX ADMIN — GADOTERATE MEGLUMINE 10 ML: 376.9 INJECTION INTRAVENOUS at 11:31

## 2020-01-20 ENCOUNTER — HOSPITAL ENCOUNTER (OUTPATIENT)
Dept: CT IMAGING | Facility: OTHER | Age: 60
Discharge: HOME | End: 2020-01-20
Attending: FAMILY MEDICINE | Admitting: FAMILY MEDICINE
Payer: COMMERCIAL

## 2020-01-20 DIAGNOSIS — Z87.891 PERSONAL HISTORY OF TOBACCO USE: ICD-10-CM

## 2020-01-20 PROCEDURE — G0297 LDCT FOR LUNG CA SCREEN: HCPCS

## 2020-01-30 ENCOUNTER — OFFICE VISIT (OUTPATIENT)
Dept: FAMILY MEDICINE | Facility: OTHER | Age: 60
End: 2020-01-30
Attending: FAMILY MEDICINE
Payer: COMMERCIAL

## 2020-01-30 VITALS
DIASTOLIC BLOOD PRESSURE: 72 MMHG | RESPIRATION RATE: 20 BRPM | OXYGEN SATURATION: 97 % | BODY MASS INDEX: 19.4 KG/M2 | HEART RATE: 84 BPM | WEIGHT: 113 LBS | SYSTOLIC BLOOD PRESSURE: 112 MMHG | TEMPERATURE: 97 F

## 2020-01-30 DIAGNOSIS — F41.9 ANXIETY: ICD-10-CM

## 2020-01-30 DIAGNOSIS — M53.3 PAIN OF LEFT SACROILIAC JOINT: Primary | ICD-10-CM

## 2020-01-30 PROCEDURE — 99213 OFFICE O/P EST LOW 20 MIN: CPT | Performed by: FAMILY MEDICINE

## 2020-01-30 RX ORDER — HYDROXYZINE HYDROCHLORIDE 10 MG/1
10-20 TABLET, FILM COATED ORAL 3 TIMES DAILY PRN
Qty: 60 TABLET | Refills: 3 | Status: SHIPPED | OUTPATIENT
Start: 2020-01-30 | End: 2022-01-25

## 2020-01-30 RX ORDER — HYDROCODONE BITARTRATE AND ACETAMINOPHEN 5; 325 MG/1; MG/1
1 TABLET ORAL EVERY 6 HOURS PRN
Qty: 15 TABLET | Refills: 0 | Status: SHIPPED | OUTPATIENT
Start: 2020-01-30 | End: 2020-03-06

## 2020-01-30 ASSESSMENT — PAIN SCALES - GENERAL: PAINLEVEL: EXTREME PAIN (8)

## 2020-01-30 NOTE — PROGRESS NOTES
Nursing Notes:   Estefanía Sahni LPN  1/30/2020 10:30 AM  Sign at exiting of workspace  Pt presents to clinic today for back pain since Monday. Pt state he was getting out of bed and felt sharp pain.      Medication Reconciliation: complete  Estefanía Sahni LPN     SUBJECTIVE:  59 year old female presents for left low back pain after getting out of bed 3 days ago. Took a few steps and then had bad pain.  No numbness or tingling in legs.  Pain in left low back. Worse with movement.     Numbness in thumb and finger since yesterday.    Would like hydroxyzine refilled for anxiety    REVIEW OF SYSTEMS:    Pertinent items are noted in HPI.    Current Outpatient Medications   Medication Sig Dispense Refill     albuterol (2.5 MG/3ML) 0.083% neb solution Take 1 vial (2.5 mg) by nebulization 4 times daily 360 mL 3     albuterol (PROAIR HFA) 108 (90 Base) MCG/ACT inhaler Inhale 2 puffs into the lungs 4 times daily as needed 3 Inhaler 0     azelastine (ASTEPRO) 0.15 % nasal spray Spray 2 sprays into both nostrils daily 30 mL 11     fluticasone (FLONASE) 50 MCG/ACT nasal spray Spray 1 spray into both nostrils daily 18 g 11     gabapentin (NEURONTIN) 400 MG capsule Take 1 capsule (400 mg) by mouth 2 times daily AND 2 capsules (800 mg) At Bedtime. 360 capsule 0     hydrOXYzine (ATARAX) 10 MG tablet Take 1-2 tablets by mouth 3 times daily as needed       lisinopril (PRINIVIL/ZESTRIL) 5 MG tablet Take 1 tablet (5 mg) by mouth daily 90 tablet 4     sodium chloride 3 % NEBU neb solution INHALE 3ML AS DIRECTED       traZODone (DESYREL) 150 MG tablet Take 1 tablet (150 mg) by mouth At Bedtime 90 tablet 0     venlafaxine (EFFEXOR-XR) 150 MG 24 hr capsule Take 2 capsules (300 mg) by mouth daily 180 capsule 0     witch hazel-glycerin (TUCKS) pad Apply 1 applicator topically       No Known Allergies    OBJECTIVE:  /72   Pulse 84   Temp 97  F (36.1  C) (Temporal)   Resp 20   Wt 51.3 kg (113 lb)   SpO2 97%   BMI 19.40 kg/m       EXAM:  General Appearance: No acute distress, but uncomfortable with movement.  Brought in by wheelchair.  Musculoskeletal Exam: Lumbar Spine: No tenderness over lumbar paraspinous muscles.  Significant tenderness over left SI joint.  No tenderness over right SI joint.  Neurologic Exam: reflexes 2+, strength symmetric lower extremities; SLR negative bilaterally  Negative Tinel sign at the wrist  Psychiatric: Normal affect and mentation        ASSESSMENT/PLAN:    ICD-10-CM    1. Pain of left sacroiliac joint M53.3 CHIROPRACTIC REFERRAL     HYDROcodone-acetaminophen (NORCO) 5-325 MG tablet   2. Anxiety F41.9 hydrOXYzine (ATARAX) 10 MG tablet       She has a lot of tenderness over the left SI joint.  It would make sense that this slipped out without any particularly aggravating injury.  Would benefit from having this treated with manual therapy soon.  Recommend chiropractic care.  Referral placed.  She can use NSAIDs to help with discomfort.  Given small quantity of hydrocodone to use to help with pain, 15 pills.  Expect improvement by the time she would utilize this full supply.    Refilled hydroxyzine to use as needed    Unclear etiology for finger tingling and is only been present for 1 day.  Recommend continued observation and return if it is a constant problem for couple weeks.  Follow-up as needed     Silvino Peace MD    This document was prepared using a combination of typing and voice generated software.  While every attempt was made for accuracy, spelling and grammatical errors may exist.

## 2020-01-30 NOTE — NURSING NOTE
Pt presents to clinic today for back pain since Monday. Pt state he was getting out of bed and felt sharp pain.      Medication Reconciliation: complete  Estefanía Sahni LPN

## 2020-01-30 NOTE — PATIENT INSTRUCTIONS
Need to get sacroiliac joint adjusted  See a chiropractor soon for this  Hydrocodone as needed for pain    If the finger tingling remains constant for a few weeks, then return

## 2020-02-06 ENCOUNTER — TELEPHONE (OUTPATIENT)
Dept: FAMILY MEDICINE | Facility: OTHER | Age: 60
End: 2020-02-06

## 2020-02-06 NOTE — TELEPHONE ENCOUNTER
Patient went to see Arnav Hernandez for Chiro at [facility]. They took x-rays of her back and are going to send over the images for  to view. Patient just wanted Nata to be aware that they are coming.

## 2020-03-06 ENCOUNTER — OFFICE VISIT (OUTPATIENT)
Dept: FAMILY MEDICINE | Facility: OTHER | Age: 60
End: 2020-03-06
Attending: FAMILY MEDICINE
Payer: COMMERCIAL

## 2020-03-06 VITALS
DIASTOLIC BLOOD PRESSURE: 82 MMHG | TEMPERATURE: 97.5 F | SYSTOLIC BLOOD PRESSURE: 124 MMHG | HEART RATE: 102 BPM | OXYGEN SATURATION: 97 % | WEIGHT: 115 LBS | BODY MASS INDEX: 19.74 KG/M2 | RESPIRATION RATE: 18 BRPM

## 2020-03-06 DIAGNOSIS — M81.8 OTHER OSTEOPOROSIS WITHOUT CURRENT PATHOLOGICAL FRACTURE: ICD-10-CM

## 2020-03-06 DIAGNOSIS — F33.41 RECURRENT MAJOR DEPRESSION IN PARTIAL REMISSION (H): ICD-10-CM

## 2020-03-06 DIAGNOSIS — G25.81 RESTLESS LEGS SYNDROME (RLS): ICD-10-CM

## 2020-03-06 DIAGNOSIS — F51.01 PRIMARY INSOMNIA: ICD-10-CM

## 2020-03-06 DIAGNOSIS — M53.3 PAIN OF LEFT SACROILIAC JOINT: Primary | ICD-10-CM

## 2020-03-06 DIAGNOSIS — G56.03 BILATERAL CARPAL TUNNEL SYNDROME: ICD-10-CM

## 2020-03-06 PROCEDURE — 99213 OFFICE O/P EST LOW 20 MIN: CPT | Performed by: FAMILY MEDICINE

## 2020-03-06 RX ORDER — GABAPENTIN 400 MG/1
CAPSULE ORAL
Qty: 270 CAPSULE | Refills: 3 | Status: SHIPPED | OUTPATIENT
Start: 2020-03-06 | End: 2021-07-27

## 2020-03-06 RX ORDER — ALENDRONATE SODIUM 70 MG/1
70 TABLET ORAL
Qty: 13 TABLET | Refills: 3 | Status: SHIPPED | OUTPATIENT
Start: 2020-03-06 | End: 2022-01-25

## 2020-03-06 RX ORDER — VENLAFAXINE HYDROCHLORIDE 150 MG/1
300 CAPSULE, EXTENDED RELEASE ORAL DAILY
Qty: 180 CAPSULE | Refills: 3 | Status: SHIPPED | OUTPATIENT
Start: 2020-03-06 | End: 2020-11-02

## 2020-03-06 RX ORDER — TRAZODONE HYDROCHLORIDE 150 MG/1
150 TABLET ORAL AT BEDTIME
Qty: 90 TABLET | Refills: 3 | Status: SHIPPED | OUTPATIENT
Start: 2020-03-06 | End: 2020-11-02

## 2020-03-06 ASSESSMENT — ANXIETY QUESTIONNAIRES
6. BECOMING EASILY ANNOYED OR IRRITABLE: SEVERAL DAYS
IF YOU CHECKED OFF ANY PROBLEMS ON THIS QUESTIONNAIRE, HOW DIFFICULT HAVE THESE PROBLEMS MADE IT FOR YOU TO DO YOUR WORK, TAKE CARE OF THINGS AT HOME, OR GET ALONG WITH OTHER PEOPLE: SOMEWHAT DIFFICULT
1. FEELING NERVOUS, ANXIOUS, OR ON EDGE: MORE THAN HALF THE DAYS
GAD7 TOTAL SCORE: 5
7. FEELING AFRAID AS IF SOMETHING AWFUL MIGHT HAPPEN: NOT AT ALL
2. NOT BEING ABLE TO STOP OR CONTROL WORRYING: SEVERAL DAYS
3. WORRYING TOO MUCH ABOUT DIFFERENT THINGS: SEVERAL DAYS
5. BEING SO RESTLESS THAT IT IS HARD TO SIT STILL: NOT AT ALL

## 2020-03-06 ASSESSMENT — PATIENT HEALTH QUESTIONNAIRE - PHQ9
SUM OF ALL RESPONSES TO PHQ QUESTIONS 1-9: 8
5. POOR APPETITE OR OVEREATING: NOT AT ALL

## 2020-03-06 NOTE — PROGRESS NOTES
Nursing Notes:   Estefanía Sahni LPN  3/6/2020 10:46 AM  Sign at exiting of workspace  Pt presents to clinic today for follow up back pain.      Medication Reconciliation: complete  Estefanía Sahni LPN     SUBJECTIVE:  59 year old female presents for follow up on left low back pain    Had SI adjustments with Dr Cortez and pain improved.  She has some spondylolisthesis and brings x-rays today for me to review.  She says that her  has recommended she pursue disability and wanted this documented.  Denies any radicular symptoms.  No leg weakness.    Potential vertebral fracture noted on the chiropractic imaging.  Has documented osteoporosis on DEXA scan in 2017.  Has been without Fosamax for some time.  She would like to resume.    Has been using gabapentin for restless legs.  She also is having some chronic pain.  Found that she really only needs to gabapentin in the evening.    Continues Effexor at 300 mg for depression. Dose increased up to this level by [facility].    Numbness in left thumb and finger intermittently for the past month. No weakness. History of carpal tunnel surgery.      REVIEW OF SYSTEMS:    Pertinent items are noted in HPI.    Current Outpatient Medications   Medication Sig Dispense Refill     albuterol (2.5 MG/3ML) 0.083% neb solution Take 1 vial (2.5 mg) by nebulization 4 times daily 360 mL 3     albuterol (PROAIR HFA) 108 (90 Base) MCG/ACT inhaler Inhale 2 puffs into the lungs 4 times daily as needed 3 Inhaler 0     azelastine (ASTEPRO) 0.15 % nasal spray Spray 2 sprays into both nostrils daily 30 mL 11     fluticasone (FLONASE) 50 MCG/ACT nasal spray Spray 1 spray into both nostrils daily 18 g 11     gabapentin (NEURONTIN) 400 MG capsule Take 1 capsule (400 mg) by mouth 2 times daily AND 2 capsules (800 mg) At Bedtime. 360 capsule 0     hydrOXYzine (ATARAX) 10 MG tablet Take 1-2 tablets (10-20 mg) by mouth 3 times daily as needed 60 tablet 3     lisinopril  (PRINIVIL/ZESTRIL) 5 MG tablet Take 1 tablet (5 mg) by mouth daily 90 tablet 4     sodium chloride 3 % NEBU neb solution INHALE 3ML AS DIRECTED       traZODone (DESYREL) 150 MG tablet Take 1 tablet (150 mg) by mouth At Bedtime 90 tablet 0     venlafaxine (EFFEXOR-XR) 150 MG 24 hr capsule Take 2 capsules (300 mg) by mouth daily 180 capsule 0     witch hazel-glycerin (TUCKS) pad Apply 1 applicator topically       No Known Allergies    OBJECTIVE:  /82   Pulse 102   Temp 97.5  F (36.4  C) (Temporal)   Resp 18   Wt 52.2 kg (115 lb)   SpO2 97%   BMI 19.74 kg/m      EXAM:  General Appearance: No acute distress, but uncomfortable with movement.  Brought in by wheelchair.  Musculoskeletal Exam: Lumbar Spine: No tenderness over lumbar paraspinous muscles.  Nontender over SI joints.  Neurologic Exam: reflexes 2+, strength symmetric lower extremities; SLR negative bilaterally  Negative Tinel sign at the wrist  Psychiatric: Normal affect and mentation        ASSESSMENT/PLAN:    ICD-10-CM    1. Pain of left sacroiliac joint M53.3    2. Primary insomnia F51.01 traZODone (DESYREL) 150 MG tablet   3. Recurrent major depression in partial remission (H) F33.41 venlafaxine (EFFEXOR-XR) 150 MG 24 hr capsule   4. Restless legs syndrome (RLS) G25.81 gabapentin (NEURONTIN) 400 MG capsule   5. Other osteoporosis without current pathological fracture M81.8 alendronate (FOSAMAX) 70 MG tablet     SI pain improved with chiropractic adjustments. Has some spondylolisthesis of L5-S1 on x-ray. No radicular symptoms.  We discussed these findings are unlikely to qualify her for disability.  She should look for appropriate work.    Refilled trazodone and venlafaxine.  Refilled gabapentin    Needs to resume weekly alendronate for osteoporosis treatment.  Would then repeat DEXA when taking consistently for 5 years.    No need for further evaluation of numbness in the left hand at this time.  She has a history of carpal tunnel surgery.  If  the numbness occurs more frequently, then return for evaluation.    Silvino Peace MD    This document was prepared using a combination of typing and voice generated software.  While every attempt was made for accuracy, spelling and grammatical errors may exist.

## 2020-03-06 NOTE — LETTER
My Depression Action Plan  Name: Lisy Telles   Date of Birth 1960  Date: 3/6/2020    My doctor: Silvino Peace   My clinic: [facility]  [address]  [place] [address]  [phone]          GREEN    ZONE   Good Control    What it looks like:     Things are going generally well. You have normal ups and downs. You may even feel depressed from time to time, but bad moods usually last less than a day.   What you need to do:  1. Continue to care for yourself (see self care plan)  2. Check your depression survival kit and update it as needed  3. Follow your physician s recommendations including any medication.  4. Do not stop taking medication unless you consult with your physician first.           YELLOW         ZONE Getting Worse    What it looks like:     Depression is starting to interfere with your life.     It may be hard to get out of bed; you may be starting to isolate yourself from others.    Symptoms of depression are starting to last most all day and this has happened for several days.     You may have suicidal thoughts but they are not constant.   What you need to do:     1. Call your care team. Your response to treatment will improve if you keep your care team informed of your progress. Yellow periods are signs an adjustment may need to be made.     2. Continue your self-care.  Just get dressed and ready for the day.  Don't give yourself time to talk yourself out of it.    3. Talk to someone in your support network.    4. Open up your Depression Self-Care Plan/Wellness Kit.           RED    ZONE Medical Alert - Get Help    What it looks like:     Depression is seriously interfering with your life.     You may experience these or other symptoms: You can t get out of bed most days, can t work or engage in other necessary activities, you have trouble taking care of basic hygiene, or basic responsibilities, thoughts of suicide or death that will  not go away, self-injurious behavior.     What you need to do:  1. Call your care team and request a same-day appointment. If they are not available (weekends or after hours) call your local crisis line, emergency room or 911.            Depression Self-Care Plan / Wellness Kit    Self-Care for Depression  Here s the deal. Your body and mind are really not as separate as most people think.  What you do and think affects how you feel and how you feel influences what you do and think. This means if you do things that people who feel good do, it will help you feel better.  Sometimes this is all it takes.  There is also a place for medication and therapy depending on how severe your depression is, so be sure to consult with your medical provider and/ or Behavioral Health Consultant if your symptoms are worsening or not improving.     In order to better manage my stress, I will:    Exercise  Get some form of exercise, every day. This will help reduce pain and release endorphins, the  feel good  chemicals in your brain. This is almost as good as taking antidepressants!  This is not the same as joining a gym and then never going! (they count on that by the way ) It can be as simple as just going for a walk or doing some gardening, anything that will get you moving.      Hygiene   Maintain good hygiene (get out of bed in the morning, make your bed, brush your teeth, take a shower, and get dressed like you were going to work, even if you are unemployed).  If your clothes don't fit try to get ones that do.    Diet  Strive to eat foods that are good for me, drink plenty of water, and avoid excessive sugar, caffeine, alcohol, and other mood-altering substances.  Some foods that are helpful in depression are: complex carbohydrates, B vitamins, flaxseed, fish or fish oil, fresh fruits and vegetables.    Psychotherapy  Agree to participate in Individual Therapy (if recommended).    Medication  If prescribed medications, I agree to  take them.  Missing doses can result in serious side effects.  I understand that drinking alcohol, or other illicit drug use, may cause potential side effects.  I will not stop my medication abruptly without first discussing it with my provider.    Staying Connected With Others  Stay in touch with my friends, family members, and my primary care provider/team.    Use your imagination  Be creative.  We all have a creative side; it doesn t matter if it s oil painting, sand castles, or mud pies! This will also kick up the endorphins.    Witness Beauty  (AKA stop and smell the roses) Take a look outside, even in mid-winter. Notice colors, textures. Watch the squirrels and birds.     Service to others  Be of service to others.  There is always someone else in need.  By helping others we can  get out of ourselves  and remember the really important things.  This also provides opportunities for practicing all the other parts of the program.    Humor  Laugh and be silly!  Adjust your TV habits for less news and crime-drama and more comedy.    Control your stress  Try breathing deep, massage therapy, biofeedback, and meditation. Find time to relax each day.     Crisis Text Line  http://www.crisistextline.org    The Crisis Text Line serves anyone, in any type of crisis, providing access to free, 24/7 support and information via the medium people already use and trust:    Here's how it works:  1.  Text 845-347 from anywhere in the [place], anytime, about any type of crisis.  2.  A live, trained Crisis Counselor receives the text and responds quickly.  3.  The volunteer Crisis Counselor will help you move from a 'hot moment to a cool moment'.    My support system    Clinic Contact:  Phone number:    Contact 1:  Phone number:    Contact 2:  Phone number:    Anabaptism/:  Phone number:    Therapist:  Phone number:    Local crisis center:    Phone number:    Other community support:  Phone number:

## 2020-03-06 NOTE — NURSING NOTE
Pt presents to clinic today for follow up back pain.      Medication Reconciliation: complete  Estefanía Sahni LPN

## 2020-03-07 ASSESSMENT — ANXIETY QUESTIONNAIRES: GAD7 TOTAL SCORE: 5

## 2020-03-11 ENCOUNTER — HEALTH MAINTENANCE LETTER (OUTPATIENT)
Age: 60
End: 2020-03-11

## 2020-04-16 DIAGNOSIS — F51.01 PRIMARY INSOMNIA: ICD-10-CM

## 2020-04-16 RX ORDER — TRAZODONE HYDROCHLORIDE 150 MG/1
TABLET ORAL
Qty: 90 TABLET | Refills: 3 | OUTPATIENT
Start: 2020-04-16

## 2020-07-06 ENCOUNTER — TRANSFERRED RECORDS (OUTPATIENT)
Dept: HEALTH INFORMATION MANAGEMENT | Facility: OTHER | Age: 60
End: 2020-07-06

## 2020-07-06 LAB
ALT SERPL-CCNC: 15 IU/L (ref 6–31)
AST SERPL-CCNC: 34 IU/L (ref 10–40)
CREAT SERPL-MCNC: 0.69 MG/DL (ref 0.4–1)
GFR SERPL CREATININE-BSD FRML MDRD: >60 ML/MIN/1.73M*2
GLUCOSE SERPL-MCNC: 116 MG/DL (ref 70–99)
POTASSIUM SERPL-SCNC: 2.6 MEQ/L (ref 3.4–5.1)

## 2020-07-09 ENCOUNTER — TRANSFERRED RECORDS (OUTPATIENT)
Dept: HEALTH INFORMATION MANAGEMENT | Facility: OTHER | Age: 60
End: 2020-07-09

## 2020-07-10 ENCOUNTER — TRANSFERRED RECORDS (OUTPATIENT)
Dept: HEALTH INFORMATION MANAGEMENT | Facility: OTHER | Age: 60
End: 2020-07-10

## 2020-07-21 ENCOUNTER — OFFICE VISIT (OUTPATIENT)
Dept: FAMILY MEDICINE | Facility: OTHER | Age: 60
End: 2020-07-21
Attending: FAMILY MEDICINE
Payer: COMMERCIAL

## 2020-07-21 VITALS
BODY MASS INDEX: 22.02 KG/M2 | WEIGHT: 129 LBS | OXYGEN SATURATION: 96 % | RESPIRATION RATE: 16 BRPM | DIASTOLIC BLOOD PRESSURE: 70 MMHG | TEMPERATURE: 96.6 F | HEART RATE: 52 BPM | SYSTOLIC BLOOD PRESSURE: 112 MMHG | HEIGHT: 64 IN

## 2020-07-21 DIAGNOSIS — F10.21 ALCOHOLISM IN RECOVERY (H): Primary | ICD-10-CM

## 2020-07-21 DIAGNOSIS — D64.9 ANEMIA, UNSPECIFIED TYPE: ICD-10-CM

## 2020-07-21 DIAGNOSIS — T50.902D INTENTIONAL DRUG OVERDOSE, SUBSEQUENT ENCOUNTER: ICD-10-CM

## 2020-07-21 PROCEDURE — 99213 OFFICE O/P EST LOW 20 MIN: CPT | Performed by: NURSE PRACTITIONER

## 2020-07-21 RX ORDER — FERROUS SULFATE 325(65) MG
325 TABLET ORAL
COMMUNITY
Start: 2020-07-09 | End: 2021-02-16

## 2020-07-21 RX ORDER — TRAZODONE HYDROCHLORIDE 50 MG/1
50 TABLET, FILM COATED ORAL
COMMUNITY
Start: 2020-07-09 | End: 2020-11-02

## 2020-07-21 RX ORDER — VENLAFAXINE HYDROCHLORIDE 150 MG/1
150 CAPSULE, EXTENDED RELEASE ORAL
COMMUNITY
Start: 2020-07-15 | End: 2020-10-01

## 2020-07-21 ASSESSMENT — PATIENT HEALTH QUESTIONNAIRE - PHQ9
SUM OF ALL RESPONSES TO PHQ QUESTIONS 1-9: 0
5. POOR APPETITE OR OVEREATING: NOT AT ALL

## 2020-07-21 ASSESSMENT — ANXIETY QUESTIONNAIRES
3. WORRYING TOO MUCH ABOUT DIFFERENT THINGS: NOT AT ALL
1. FEELING NERVOUS, ANXIOUS, OR ON EDGE: NOT AT ALL
5. BEING SO RESTLESS THAT IT IS HARD TO SIT STILL: NOT AT ALL
2. NOT BEING ABLE TO STOP OR CONTROL WORRYING: NOT AT ALL
IF YOU CHECKED OFF ANY PROBLEMS ON THIS QUESTIONNAIRE, HOW DIFFICULT HAVE THESE PROBLEMS MADE IT FOR YOU TO DO YOUR WORK, TAKE CARE OF THINGS AT HOME, OR GET ALONG WITH OTHER PEOPLE: NOT DIFFICULT AT ALL
6. BECOMING EASILY ANNOYED OR IRRITABLE: NOT AT ALL

## 2020-07-21 ASSESSMENT — PAIN SCALES - GENERAL: PAINLEVEL: NO PAIN (1)

## 2020-07-21 ASSESSMENT — MIFFLIN-ST. JEOR: SCORE: 1145.14

## 2020-07-21 NOTE — PROGRESS NOTES
SUBJECTIVE:                                                      Patient presents for Hospital Followup Visit:    H[facility]      Date of Admission: 7/06/2020  Date of Discharge: 7/14/2020  Transitional Care Management Phone Call: Not applicable  Reason(s) for Admission: Drug overdose, alcoholism            Problems taking medications regularly:  None       Medication changes since discharge: None       Problems adhering to non-medication therapy:  None    Summary of hospitalization:  See outside records, reviewed and scanned  Diagnostic Tests/Treatments reviewed.  Follow up needed: none  Other Healthcare Providers Involved in Patient s Care:         None  Update since discharge: improved.     Patient reports that she is struggling with alcoholism and intentionally took more medications than she was supposed to.  Since she is got out of the hospital she reports that she is better than she has ever been in her life.  She is no longer drinking alcohol.  She is reconnected with her [employer] and her and her  are working together to help with the relationship and help with her concerns.  She has been connected to  and working on getting outpatient treatment.  She continues to take Effexor 300 mg which is working well for her.  She states she is sleeping well.  She takes gabapentin for back pain and restless leg syndrome, trazodone and hydroxyzine.  She denies any alcohol use or cravings.  She was a naltrexone in the past and felt this was helpful.  She declines this at this time as she is not currently having any cravings but wants to reach out if this becomes a concern for her.  She has gained 14 pounds since March, she reports that she is eating much better.  She has had trace edema in her legs but no other concerns.  She is taking ferrous sulfate for anemia, her hemoglobin was low when she is in the hospital.    ROS:  Constitutional, neuro, ENT, endocrine, pulmonary, cardiac, gastrointestinal,  genitourinary, musculoskeletal, integument and psychiatric systems are negative, except as otherwise noted.    OBJECTIVE:                                                      GENERAL APPEARANCE: healthy, alert and no distress  RESP: lungs clear to auscultation - no rales, rhonchi or wheezes  CV: regular rates and rhythm, normal S1 S2, no S3 or S4, no murmur, click or rub and nonpitting B/L LE edema to bilateral ankles  PSYCH: mentation appears normal and affect normal/bright    ASSESSMENT/PLAN:                                                      (F10.21) Alcoholism in recovery (H)  (primary encounter diagnosis)  Comment: She currently is remaining abstinent from alcohol.  Working with AA.  Getting outpatient therapy set up.  Plan: Continue above    (T50.902D) Intentional drug overdose, subsequent encounter  Comment: She shows remorse for her actions and reports that she is apologized to her family members.  Plan: Continue with current antidepressants.  Declines any formal therapy at this time.    (D64.9) Anemia, unspecified type  Comment: Hemoglobin is mildly low.  Taking ferrous sulfate daily.  Plan: Recheck hemoglobin in 1 month.        Post Discharge Medication Reconciliation: discharge medications reconciled, continue medications without change.  Issues to address: No issues identified.  Plan of care communicated with patient      Type of Medical Decision Making Face-to-Face Visit within 7 Days Face-to-Face Visit within 14 days   Moderate Complexity [id number]   High Complexity [id number]

## 2020-07-21 NOTE — NURSING NOTE
"Chief Complaint   Patient presents with     Hospital F/U     [facility] F/[facility]       Patient presents today in clinic for the following Hospital F/U    Initial /70   Pulse 52   Temp 96.6  F (35.9  C) (Temporal)   Resp 16   Ht 1.626 m (5' 4\")   Wt 58.5 kg (129 lb)   SpO2 96%   BMI 22.14 kg/m   Estimated body mass index is 22.14 kg/m  as calculated from the following:    Height as of this encounter: 1.626 m (5' 4\").    Weight as of this encounter: 58.5 kg (129 lb).  Medication Reconciliation: complete    CHE, FLINCK, LPN  "

## 2020-08-27 ENCOUNTER — OFFICE VISIT (OUTPATIENT)
Dept: FAMILY MEDICINE | Facility: OTHER | Age: 60
End: 2020-08-27
Attending: NURSE PRACTITIONER
Payer: COMMERCIAL

## 2020-08-27 VITALS
HEART RATE: 64 BPM | OXYGEN SATURATION: 97 % | RESPIRATION RATE: 16 BRPM | TEMPERATURE: 97.6 F | BODY MASS INDEX: 21.35 KG/M2 | WEIGHT: 124.4 LBS | DIASTOLIC BLOOD PRESSURE: 74 MMHG | SYSTOLIC BLOOD PRESSURE: 122 MMHG

## 2020-08-27 DIAGNOSIS — D50.9 IRON DEFICIENCY ANEMIA, UNSPECIFIED IRON DEFICIENCY ANEMIA TYPE: ICD-10-CM

## 2020-08-27 DIAGNOSIS — M25.50 MULTIPLE JOINT PAIN: Primary | ICD-10-CM

## 2020-08-27 DIAGNOSIS — I49.3 PVC'S (PREMATURE VENTRICULAR CONTRACTIONS): ICD-10-CM

## 2020-08-27 DIAGNOSIS — R53.83 FATIGUE, UNSPECIFIED TYPE: ICD-10-CM

## 2020-08-27 LAB
BASOPHILS # BLD AUTO: 0.1 10E9/L (ref 0–0.2)
BASOPHILS NFR BLD AUTO: 0.8 %
CRP SERPL-MCNC: 0.1 MG/L
DIFFERENTIAL METHOD BLD: ABNORMAL
EOSINOPHIL # BLD AUTO: 0.3 10E9/L (ref 0–0.7)
EOSINOPHIL NFR BLD AUTO: 4.2 %
ERYTHROCYTE [DISTWIDTH] IN BLOOD BY AUTOMATED COUNT: 15.6 % (ref 10–15)
ERYTHROCYTE [SEDIMENTATION RATE] IN BLOOD BY WESTERGREN METHOD: 8 MM/H (ref 1–15)
HCT VFR BLD AUTO: 38.4 % (ref 35–47)
HGB BLD-MCNC: 12.2 G/DL (ref 11.7–15.7)
IMM GRANULOCYTES # BLD: 0 10E9/L (ref 0–0.4)
IMM GRANULOCYTES NFR BLD: 0.2 %
IRON SATN MFR SERPL: 15 % (ref 20–55)
IRON SERPL-MCNC: 59 UG/DL (ref 50–212)
LYMPHOCYTES # BLD AUTO: 1.8 10E9/L (ref 0.8–5.3)
LYMPHOCYTES NFR BLD AUTO: 27.5 %
MCH RBC QN AUTO: 28.4 PG (ref 26.5–33)
MCHC RBC AUTO-ENTMCNC: 31.8 G/DL (ref 31.5–36.5)
MCV RBC AUTO: 90 FL (ref 78–100)
MONOCYTES # BLD AUTO: 0.5 10E9/L (ref 0–1.3)
MONOCYTES NFR BLD AUTO: 7.2 %
NEUTROPHILS # BLD AUTO: 3.8 10E9/L (ref 1.6–8.3)
NEUTROPHILS NFR BLD AUTO: 60.1 %
PLATELET # BLD AUTO: 255 10E9/L (ref 150–450)
RBC # BLD AUTO: 4.29 10E12/L (ref 3.8–5.2)
RHEUMATOID FACT SER NEPH-ACNC: <14 IU/ML (ref 0–20)
TIBC SERPL-MCNC: 401.8 UG/DL (ref 245–400)
TSH SERPL DL<=0.05 MIU/L-ACNC: 2.74 IU/ML (ref 0.34–5.6)
UIBC (UNSATURATED): 342.8 MG/DL
WBC # BLD AUTO: 6.4 10E9/L (ref 4–11)

## 2020-08-27 PROCEDURE — 93000 ELECTROCARDIOGRAM COMPLETE: CPT | Performed by: INTERNAL MEDICINE

## 2020-08-27 PROCEDURE — 36415 COLL VENOUS BLD VENIPUNCTURE: CPT | Mod: ZL | Performed by: NURSE PRACTITIONER

## 2020-08-27 PROCEDURE — 85025 COMPLETE CBC W/AUTO DIFF WBC: CPT | Mod: ZL | Performed by: NURSE PRACTITIONER

## 2020-08-27 PROCEDURE — 83550 IRON BINDING TEST: CPT | Mod: ZL | Performed by: NURSE PRACTITIONER

## 2020-08-27 PROCEDURE — 99214 OFFICE O/P EST MOD 30 MIN: CPT | Performed by: NURSE PRACTITIONER

## 2020-08-27 PROCEDURE — 86618 LYME DISEASE ANTIBODY: CPT | Mod: ZL | Performed by: NURSE PRACTITIONER

## 2020-08-27 PROCEDURE — 84443 ASSAY THYROID STIM HORMONE: CPT | Mod: ZL | Performed by: NURSE PRACTITIONER

## 2020-08-27 PROCEDURE — 86431 RHEUMATOID FACTOR QUANT: CPT | Mod: ZL | Performed by: NURSE PRACTITIONER

## 2020-08-27 PROCEDURE — 86140 C-REACTIVE PROTEIN: CPT | Mod: ZL | Performed by: NURSE PRACTITIONER

## 2020-08-27 PROCEDURE — 83540 ASSAY OF IRON: CPT | Mod: ZL | Performed by: NURSE PRACTITIONER

## 2020-08-27 PROCEDURE — 85652 RBC SED RATE AUTOMATED: CPT | Mod: ZL | Performed by: NURSE PRACTITIONER

## 2020-08-27 ASSESSMENT — PAIN SCALES - GENERAL: PAINLEVEL: MILD PAIN (2)

## 2020-08-27 NOTE — NURSING NOTE
"Chief Complaint   Patient presents with     RECHECK     Follow up on labs/Lyme test       Patient presents today in clinic for the following recheck on labs and patient states that she is having joint pain and fatigue    Initial /74   Pulse 64   Temp 97.6  F (36.4  C) (Temporal)   Resp 16   Wt 56.4 kg (124 lb 6.4 oz)   SpO2 97%   BMI 21.35 kg/m   Estimated body mass index is 21.35 kg/m  as calculated from the following:    Height as of 7/21/20: 1.626 m (5' 4\").    Weight as of this encounter: 56.4 kg (124 lb 6.4 oz).  Medication Reconciliation: complete    CHE, FLINCK, LPN  "

## 2020-08-27 NOTE — PROGRESS NOTES
HPI:    Lisy Telles is a 60 year old female who presents to clinic today for follow-up on hemoglobin.  She was in the emergency room and inpatient hospital stay in July for intentional drug overdose.  Hemoglobin was mildly low.  She is currently taking ferrous sulfate.  It was recommended that she follow-up in 1 month for recheck.  Last hemoglobin dated 7/9/2020 was 9.2.  Total iron was 24 on July 6, 2020, iron saturation was low at 6, transferrin is 266 and ferritin was 16.  She reports has had significant fatigue, has a hard time staying awake at times.  No new shortness of breath but always has some related to her COPD.  She is been having some arthritis type pain in her right hand, elbows and arms.  She has pain in her arms if she lifts them over her head.  Her hands feel stiff especially in the mornings.  She did have a couple wood tick bites but no tick bites that she is aware of.  No history of autoimmune disease.  She has not had any further anxiety attacks.  Denies any chest pains, edema or heart palpitations.  No family history of rheumatoid arthritis, sister have hypothyroidism.  She has a cup or 2 of coffee a day, no street drugs.  Overall mental health she feels has improved significantly since last time I saw her.  Past Medical History:   Diagnosis Date     Age-related osteoporosis with current pathological fracture with routine healing     9/20/2017     Anxiety disorder     No Comments Provided     Appendicitis     No Comments Provided     Benign neoplasm of connective and other soft tissue, unspecified     Right Breast     Bilateral carpal tunnel syndrome      Brachial plexus disorders     6/24/2016     Chronic obstructive pulmonary disease (H)     No Comments Provided     Essential (primary) hypertension     No Comments Provided     Major depressive disorder, single episode     Depression with insomnia and tiredness     Other displaced fracture of upper end of left humerus, subsequent  encounter for fracture with routine healing     2016     Other fracture of unspecified lower leg, initial encounter for closed fracture     Fracture, right ankle     Personal history of other medical treatment (CODE)      4, Para 4-0-0-4     Pure hypercholesterolemia     No Comments Provided     Rectal polyp     Hyperplastic polyp times three noted on colonoscopy 2007     Tobacco use     No Comments Provided     Uncomplicated alcohol dependence (H)     No Comments Provided       Past Surgical History:   Procedure Laterality Date     BRONCHOSCOPY DIAGNOSTIC  2018    MAC infection     CLOSED REDUCTION ANKLE      07,Open reduction/internal fixation, right ankle fracture 07.     COLONOSCOPY      ,screening for family history,  follow-up recommended in five years.     COLONOSCOPY      ,Colonoscopy 2007.  Follow-up recommended in five years.     COLONOSCOPY  2013,Normal, hemorrhoids - follow up 5 years for family history of colon cnacer     HYSTERECTOMY TOTAL ABDOMINAL      2007,Laparoscopic supracervical hysterectomy and bilateral salpingo-oophorectomy for bleeding fibroid     LAPAROSCOPIC BYPASS GASTRIC           LAPAROSCOPIC TUBAL LIGATION      1985     LOBECTOMY LUNG  2018    VATS - LOBECTOMY UPPER LUNG, Dr [address]     LUMPECTOMY BREAST      Lumpectomy of the right breast     RELEASE CARPAL TUNNEL Left 2019    Procedure: RELEASE CARPAL TUNNEL;  Surgeon: Andrew Sahni MD;  Location: [facility] OR     RELEASE CARPAL TUNNEL Right 2019    Procedure: Open Carpal Tunnel Release;  Surgeon: Andrew Sahni MD;  Location: [facility] OR     SKIN GRAFT, EACH ADDN 100SQCM  2007    Skin grafting post right ankle fracture       Family History   Problem Relation Age of Onset     Heart Disease Father 36        Heart Disease,MI  - Quadruple bypass     Hypertension Father         Hypertension     Other - See Comments Father         Obesity      Colon Cancer Mother 40        Cancer-colon,surgical resection--remission     Hypertension Mother         Hypertension     Other - See Comments Mother         Obesity     Colon Cancer Maternal Grandmother         Cancer-colon     Thyroid Disease Sister         Thyroid Disease     Breast Cancer Paternal Aunt 60        Cancer-breast,       Social History     Socioeconomic History     Marital status:      Spouse name: noris     Number of children: Not on file     Years of education: 11 GED     Highest education level: Not on file   Occupational History     Not on file   Social Needs     Financial resource strain: Not on file     Food insecurity     Worry: Not on file     Inability: Not on file     Transportation needs     Medical: Not on file     Non-medical: Not on file   Tobacco Use     Smoking status: Light Tobacco Smoker     Packs/day: 0.00     Years: 43.00     Pack years: 0.00     Types: Cigarettes     Smokeless tobacco: Never Used     Tobacco comment: 5 per day   Substance and Sexual Activity     Alcohol use: Yes     Alcohol/week: 0.0 standard drinks     Comment: Alcoholic Drinks/day: 4-5 beers and 1/2 pt vodka daily     Drug use: No     Comment: Drug use: No     Sexual activity: Yes     Partners: Male     Birth control/protection: Surgical   Lifestyle     Physical activity     Days per week: Not on file     Minutes per session: Not on file     Stress: Not on file   Relationships     Social connections     Talks on phone: Not on file     Gets together: Not on file     Attends Nondenominational service: Not on file     Active member of club or organization: Not on file     Attends meetings of clubs or organizations: Not on file     Relationship status: Not on file     Intimate partner violence     Fear of current or ex partner: Not on file     Emotionally abused: Not on file     Physically abused: Not on file     Forced sexual activity: Not on file   Other Topics Concern     Parent/sibling w/ CABG, MI or  angioplasty before 65F 55M? Not Asked   Social History Narrative    . Drinks 6 beers daily.       Current Outpatient Medications   Medication Sig Dispense Refill     albuterol (2.5 MG/3ML) 0.083% neb solution Take 1 vial (2.5 mg) by nebulization 4 times daily 360 mL 3     albuterol (PROAIR HFA) 108 (90 Base) MCG/ACT inhaler Inhale 2 puffs into the lungs 4 times daily as needed 3 Inhaler 0     alendronate (FOSAMAX) 70 MG tablet Take 1 tablet (70 mg) by mouth every 7 days Take in the morning on empty stomach with full glass of water. Do not lie down for 1 hr. 13 tablet 3     azelastine (ASTEPRO) 0.15 % nasal spray Spray 2 sprays into both nostrils daily 30 mL 11     diclofenac (VOLTAREN) 1 % topical gel Apply 2 g topically 4 times daily 150 g 1     ferrous sulfate (FEROSUL) 325 (65 Fe) MG tablet Take 325 mg by mouth       fluticasone (FLONASE) 50 MCG/ACT nasal spray Spray 1 spray into both nostrils daily 18 g 11     gabapentin (NEURONTIN) 400 MG capsule Take 1 capsule (400 mg) by mouth daily AND 2 capsules (800 mg) At Bedtime. 270 capsule 3     hydrOXYzine (ATARAX) 10 MG tablet Take 1-2 tablets (10-20 mg) by mouth 3 times daily as needed 60 tablet 3     lisinopril (PRINIVIL/ZESTRIL) 5 MG tablet Take 1 tablet (5 mg) by mouth daily 90 tablet 4     sodium chloride 3 % NEBU neb solution INHALE 3ML AS DIRECTED       traZODone (DESYREL) 150 MG tablet Take 1 tablet (150 mg) by mouth At Bedtime 90 tablet 3     traZODone (DESYREL) 50 MG tablet Take 50 mg by mouth       venlafaxine (EFFEXOR-XR) 150 MG 24 hr capsule Take 150 mg by mouth       venlafaxine (EFFEXOR-XR) 150 MG 24 hr capsule Take 2 capsules (300 mg) by mouth daily 180 capsule 3     witch hazel-glycerin (TUCKS) pad Apply 1 applicator topically         No Known Allergies    ROS:  Pertinent positives and negatives are noted in HPI.    EXAM:  /74   Pulse 64   Temp 97.6  F (36.4  C) (Temporal)   Resp 16   Wt 56.4 kg (124 lb 6.4 oz)   SpO2 97%   BMI  21.35 kg/m    General appearance: well appearing female, in no acute distress but appears fatigued  Head: normocephalic, atraumatic  Eyes: conjunctivae normal  Oropharynx: moist mucous membranes  Neck: supple without adenopathy  Respiratory: clear to auscultation bilaterally  Cardiac: Irregularly irregular with no murmurs, no peripheral edema  Musculoskeletal: Mild enlargement of PIP joints on first fingers on bilateral hands  Dermatological: no rashes or lesions  Psychological: normal affect, alert and pleasant    PHQ Depression Screen  PHQ-9 SCORE 2/6/2019 3/6/2020 7/21/2020   PHQ-9 Total Score 2 8 0       ASSESSMENT AND PLAN:    1. Multiple joint pain    2. Iron deficiency anemia, unspecified iron deficiency anemia type    3. PVC's (premature ventricular contractions)    4. Fatigue, unspecified type      EKG was obtained with normal sinus rhythm showing frequent PVCs.  She is not symptomatic to this.  Given her extreme fatigue we will get her set up for stress echo.  Labs are pending including rheumatoid factor, CRP, sed rate, Lyme disease.  Due to her fatigue had also checked a TSH.  She is due for recheck of her hemoglobin and iron panel, these are pending as well.  Diclofenac gel was prescribed for arthritic pain pending labs.  She needs Tylenol or ibuprofen as needed.  Plan to follow-up with results via telephone and my chart per her request when these are available.      KELI Nicole CNP..................8/27/2020 12:49 PM      This document was prepared using voice generated software.  While every attempt was made for accuracy, grammatical errors may exist.

## 2020-08-29 LAB — INTERPRETATION ECG - MUSE: NORMAL

## 2020-08-31 LAB — B BURGDOR IGG+IGM SER QL: 0.04 (ref 0–0.89)

## 2020-09-09 ENCOUNTER — TELEPHONE (OUTPATIENT)
Dept: CARDIOLOGY | Facility: OTHER | Age: 60
End: 2020-09-09

## 2020-09-09 NOTE — TELEPHONE ENCOUNTER
Patient verified .  Reminder call for stress test with instructions given.  Patient verbalized understanding. Reminder for patient to bring inhaler to appointment

## 2020-09-14 ENCOUNTER — HOSPITAL ENCOUNTER (OUTPATIENT)
Dept: CARDIOLOGY | Facility: OTHER | Age: 60
Discharge: HOME | End: 2020-09-14
Attending: NURSE PRACTITIONER | Admitting: NURSE PRACTITIONER
Payer: COMMERCIAL

## 2020-09-14 VITALS — OXYGEN SATURATION: 99 % | TEMPERATURE: 98.4 F

## 2020-09-14 DIAGNOSIS — I49.3 PVC (PREMATURE VENTRICULAR CONTRACTION): ICD-10-CM

## 2020-09-14 DIAGNOSIS — R53.83 FATIGUE, UNSPECIFIED TYPE: ICD-10-CM

## 2020-09-14 PROCEDURE — 93306 TTE W/DOPPLER COMPLETE: CPT | Mod: 26 | Performed by: INTERNAL MEDICINE

## 2020-09-14 PROCEDURE — 93306 TTE W/DOPPLER COMPLETE: CPT

## 2020-09-14 NOTE — PROGRESS NOTES
14:00:  The patient arrived for a stress echo.  The procedure, risks and benefits were discussed and the consent was signed.  The patient was prepped for the stress test, and an IV was placed per procedure.  The echo sonographer did the initial images with definity for image enhancement. The echo tech consulted with Samia and Dr. Moreno and it was decided that the stress echo test would be cancelled and a full echo would be performed.  Patient verbalized understanding.  IV discontinued and full echo performed.  She will be called by the ordering MD with results.

## 2020-09-15 ENCOUNTER — MYC MEDICAL ADVICE (OUTPATIENT)
Dept: FAMILY MEDICINE | Facility: OTHER | Age: 60
End: 2020-09-15

## 2020-09-15 DIAGNOSIS — I49.3 PVC'S (PREMATURE VENTRICULAR CONTRACTIONS): Primary | ICD-10-CM

## 2020-09-29 ENCOUNTER — OFFICE VISIT (OUTPATIENT)
Dept: CARDIOLOGY | Facility: OTHER | Age: 60
End: 2020-09-29
Attending: NURSE PRACTITIONER
Payer: COMMERCIAL

## 2020-09-29 VITALS
OXYGEN SATURATION: 100 % | TEMPERATURE: 96.5 F | RESPIRATION RATE: 18 BRPM | BODY MASS INDEX: 22.53 KG/M2 | HEIGHT: 64 IN | DIASTOLIC BLOOD PRESSURE: 72 MMHG | HEART RATE: 79 BPM | WEIGHT: 132 LBS | SYSTOLIC BLOOD PRESSURE: 104 MMHG

## 2020-09-29 DIAGNOSIS — Z71.6 TOBACCO ABUSE COUNSELING: ICD-10-CM

## 2020-09-29 DIAGNOSIS — J43.8 OTHER EMPHYSEMA (H): ICD-10-CM

## 2020-09-29 DIAGNOSIS — E78.2 MIXED HYPERLIPIDEMIA: ICD-10-CM

## 2020-09-29 DIAGNOSIS — I49.3 PVC'S (PREMATURE VENTRICULAR CONTRACTIONS): ICD-10-CM

## 2020-09-29 DIAGNOSIS — I10 ESSENTIAL HYPERTENSION: ICD-10-CM

## 2020-09-29 DIAGNOSIS — I34.0 MODERATE MITRAL REGURGITATION: Primary | ICD-10-CM

## 2020-09-29 DIAGNOSIS — E87.6 HYPOKALEMIA: ICD-10-CM

## 2020-09-29 DIAGNOSIS — Z72.0 TOBACCO ABUSE: ICD-10-CM

## 2020-09-29 DIAGNOSIS — I49.9 IRREGULAR HEART RATE: ICD-10-CM

## 2020-09-29 DIAGNOSIS — I51.7 BILATERAL ENLARGEMENT OF ATRIA: ICD-10-CM

## 2020-09-29 LAB
ALBUMIN SERPL-MCNC: 4.1 G/DL (ref 3.5–5.7)
ALP SERPL-CCNC: 67 U/L (ref 34–104)
ALT SERPL W P-5'-P-CCNC: 21 U/L (ref 7–52)
ANION GAP SERPL CALCULATED.3IONS-SCNC: 6 MMOL/L (ref 3–14)
AST SERPL W P-5'-P-CCNC: 26 U/L (ref 13–39)
BILIRUB SERPL-MCNC: 0.4 MG/DL (ref 0.3–1)
BUN SERPL-MCNC: 14 MG/DL (ref 7–25)
CALCIUM SERPL-MCNC: 9.5 MG/DL (ref 8.6–10.3)
CHLORIDE SERPL-SCNC: 104 MMOL/L (ref 98–107)
CO2 SERPL-SCNC: 30 MMOL/L (ref 21–31)
CREAT SERPL-MCNC: 1.02 MG/DL (ref 0.6–1.2)
FOLATE SERPL-MCNC: 7.6 NG/ML
GFR SERPL CREATININE-BSD FRML MDRD: 55 ML/MIN/{1.73_M2}
GLUCOSE SERPL-MCNC: 81 MG/DL (ref 70–105)
INTERPRETATION ECG - MUSE: NORMAL
MAGNESIUM SERPL-MCNC: 1.9 MG/DL (ref 1.9–2.7)
NT-PROBNP SERPL-MCNC: 132 PG/ML (ref 0–100)
POTASSIUM SERPL-SCNC: 4.4 MMOL/L (ref 3.5–5.1)
PROT SERPL-MCNC: 6.9 G/DL (ref 6.4–8.9)
SODIUM SERPL-SCNC: 140 MMOL/L (ref 134–144)
VIT B12 SERPL-MCNC: 302 PG/ML (ref 180–914)

## 2020-09-29 PROCEDURE — 93000 ELECTROCARDIOGRAM COMPLETE: CPT | Performed by: INTERNAL MEDICINE

## 2020-09-29 PROCEDURE — 83735 ASSAY OF MAGNESIUM: CPT | Mod: ZL | Performed by: INTERNAL MEDICINE

## 2020-09-29 PROCEDURE — 82746 ASSAY OF FOLIC ACID SERUM: CPT | Mod: ZL | Performed by: INTERNAL MEDICINE

## 2020-09-29 PROCEDURE — 83880 ASSAY OF NATRIURETIC PEPTIDE: CPT | Mod: ZL | Performed by: INTERNAL MEDICINE

## 2020-09-29 PROCEDURE — 82607 VITAMIN B-12: CPT | Mod: ZL | Performed by: INTERNAL MEDICINE

## 2020-09-29 PROCEDURE — 36415 COLL VENOUS BLD VENIPUNCTURE: CPT | Mod: ZL | Performed by: INTERNAL MEDICINE

## 2020-09-29 PROCEDURE — 99204 OFFICE O/P NEW MOD 45 MIN: CPT | Performed by: INTERNAL MEDICINE

## 2020-09-29 PROCEDURE — 80053 COMPREHEN METABOLIC PANEL: CPT | Mod: ZL | Performed by: INTERNAL MEDICINE

## 2020-09-29 ASSESSMENT — PAIN SCALES - GENERAL: PAINLEVEL: MODERATE PAIN (5)

## 2020-09-29 ASSESSMENT — MIFFLIN-ST. JEOR: SCORE: 1155.26

## 2020-09-29 NOTE — NURSING NOTE
"Patient comes in for consult on PVC's. Jaycee Zarate LPN ....................9/29/2020   2:29 PM  Chief Complaint   Patient presents with     Consult For     PVC's       Initial /72 (BP Location: Right arm, Patient Position: Sitting, Cuff Size: Adult Regular)   Pulse 79   Temp 96.5  F (35.8  C) (Tympanic)   Resp 18   Ht 1.62 m (5' 3.78\")   Wt 59.9 kg (132 lb)   SpO2 100%   BMI 22.81 kg/m   Estimated body mass index is 22.81 kg/m  as calculated from the following:    Height as of this encounter: 1.62 m (5' 3.78\").    Weight as of this encounter: 59.9 kg (132 lb).  Meds Reconciled: complete  Pt is not on Aspirin  Pt is not on a Statin  PHQ and/or NOA reviewed. Pt referred to PCP/MH Provider as appropriate.    Jaycee Zarate LPN      "

## 2020-09-29 NOTE — PATIENT INSTRUCTIONS
You were seen by  Jose Guadalupe Lechuga DO      1. A Zio patch (Heart Monitor) has been ordered. You will be called to schedule this. You will receive instructions for testing at that time. You will be contacted with results.       2. Laboratory blood work has been ordered.  You will be notified by phone call or Pagevamphart message when the results are available.      3. A GUSTAVO (transesophageal echocardiogram) has been ordered for in the [facility]. You will be called to schedule this. You will receive instructions for testing at that time. You will be contacted with results.      4. No other changes at this time.    You will follow up with [facility] in 6 weeks, sooner if needed.       Please call the cardiology office with problems, questions, or concerns at [phone].    If you experience chest pain, chest pressure, chest tightness, shortness of breath, fainting, lightheadedness, nausea, vomiting, or other concerning symptoms, please report to the Emergency Department or call 911. These symptoms may be emergent, and best treated in the Emergency Department.     Cardiology Nurses  MAJO Carrion, MARIAM ACOSTA LPN  [facility] ([address])  [phone]

## 2020-09-29 NOTE — PROGRESS NOTES
[facility] HEART CARE   CARDIOLOGY CONSULT     Lisy Telles   1960  [id number]    Silvino Peace     Chief Complaint   Patient presents with     Consult For     PVC's          HPI:   Mrs. Telles is a 60-year-old female who is being seen by cardiology for an echocardiogram with moderate mitral regurgitation, frequent PVCs, and noncardiac chest pain.    For the last few months, she has noted an irregular pulse. She has been increasingly fatigued.  She was seen in the clinic on 8/27/2024 for an intentional overdose on 7/6/2020 with trazodone.  She was taking iron for a mildly low hemoglobin.  Patient reports significant fatigue.  No chest pain but has shortness of breath with presumptive COPD with a long history of tobacco abuse starting at age 13. She has smoked up to 1.5 packs/day currently at a pack a day.  She has a quit date of October 15, 2020.  Lyme disease ordered with concerns for tick bite.  No history of autoimmune disease but describes pain in both arms and across the chest that is tender.  Patient does not believe she has sleep apnea denying snoring or stopping breathing at night.  Her  told her that she occasionally snores but not significant.  As part of the work-up, a stress echo was ordered to assess for heart disease as causing her symptoms.    She was to have a stress echo.  As part of pulmonary images, she was found to have moderate mitral insufficiency with a low normal EF of 50-55%.  Noted frequent PVCs as well.  There was no wall motion normality at rest.  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  Based on frequent PVCs as noted on EKG as well as a stress test and her moderate mitral insufficiency, she was referred to cardiology.    Of note, she was found to have a potassium of 2.6 on 7/6/2020.  There is also history of alcohol abuse and suicidal intentions.  She was noted to have lung nodule previously.    IMAGING RESULTS:   Echocardiogram on 9/14/2020:  Stress test not  performed due to underlying MR and PVCs.       Borderline (EF 50-55%) reduced left ventricular function is present. Moderate   LV dilation. No regional wall motion abnormalities are seen.   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   IVC diameter <2.1 cm collapsing >50% with sniff suggests a normal RA pressure   of 3 mmHg.   No pericardial effusion is present.   Mild dilatation of the aorta is present.   There is no prior study for direct comparison.     CURRENT MEDICATIONS:   Prior to Admission medications    Medication Sig Start Date End Date Taking? Authorizing Provider   albuterol (2.5 MG/3ML) 0.083% neb solution Take 1 vial (2.5 mg) by nebulization 4 times daily 11/20/18   Silvino Peace MD   albuterol (PROAIR HFA) 108 (90 Base) MCG/ACT inhaler Inhale 2 puffs into the lungs 4 times daily as needed 1/16/20   Silvino Peace MD   alendronate (FOSAMAX) 70 MG tablet Take 1 tablet (70 mg) by mouth every 7 days Take in the morning on empty stomach with full glass of water. Do not lie down for 1 hr. 3/6/20   Silvino Peace MD   azelastine (ASTEPRO) 0.15 % nasal spray Spray 2 sprays into both nostrils daily 1/16/20   Silvino Peace MD   diclofenac (VOLTAREN) 1 % topical gel Apply 2 g topically 4 times daily 8/27/20   Yanique Sahni APRN CNP   ferrous sulfate (FEROSUL) 325 (65 Fe) MG tablet Take 325 mg by mouth 7/9/20   Reported, Patient   fluticasone (FLONASE) 50 MCG/ACT nasal spray Spray 1 spray into both nostrils daily 1/16/20   Silvino Peace MD   gabapentin (NEURONTIN) 400 MG capsule Take 1 capsule (400 mg) by mouth daily AND 2 capsules (800 mg) At Bedtime. 3/6/20   Silvino Peace MD   hydrOXYzine (ATARAX) 10 MG tablet Take 1-2 tablets (10-20 mg) by mouth 3 times daily as needed 1/30/20   Silvino Peace MD   lisinopril (PRINIVIL/ZESTRIL) 5 MG tablet Take 1 tablet (5 mg) by mouth daily 1/16/20   Silvino Peace MD   sodium chloride 3 %  NEBU neb solution INHALE 3ML AS DIRECTED 17   Reported, Patient   traZODone (DESYREL) 150 MG tablet Take 1 tablet (150 mg) by mouth At Bedtime 3/6/20   Silvino Peace MD   traZODone (DESYREL) 50 MG tablet Take 50 mg by mouth 20   Reported, Patient   venlafaxine (EFFEXOR-XR) 150 MG 24 hr capsule Take 150 mg by mouth 7/15/20   Reported, Patient   venlafaxine (EFFEXOR-XR) 150 MG 24 hr capsule Take 2 capsules (300 mg) by mouth daily 3/6/20   Silvino Peace MD   witch hazel-glycerin (TUCKS) pad Apply 1 applicator topically 10/9/17   Reported, Patient       ALLERGIES:   No Known Allergies     PAST MEDICAL HISTORY:   Past Medical History:   Diagnosis Date     Age-related osteoporosis with current pathological fracture with routine healing     2017     Anxiety disorder     No Comments Provided     Appendicitis     No Comments Provided     Benign neoplasm of connective and other soft tissue, unspecified     Right Breast     Bilateral carpal tunnel syndrome      Brachial plexus disorders     2016     Chronic obstructive pulmonary disease (H)     No Comments Provided     Essential (primary) hypertension     No Comments Provided     Major depressive disorder, single episode     Depression with insomnia and tiredness     Other displaced fracture of upper end of left humerus, subsequent encounter for fracture with routine healing     2016     Other fracture of unspecified lower leg, initial encounter for closed fracture     Fracture, right ankle     Personal history of other medical treatment (CODE)      4, Para 4-0-0-4     Pure hypercholesterolemia     No Comments Provided     Rectal polyp     Hyperplastic polyp times three noted on colonoscopy 2007     Tobacco use     No Comments Provided     Uncomplicated alcohol dependence (H)     No Comments Provided        PAST SURGICAL HISTORY:   Past Surgical History:   Procedure Laterality Date     BRONCHOSCOPY DIAGNOSTIC  2018    [facility] [place]  infection     CLOSED REDUCTION ANKLE      07,Open reduction/internal fixation, right ankle fracture 07.     COLONOSCOPY      ,screening for family history,  follow-up recommended in five years.     COLONOSCOPY      ,Colonoscopy 2007.  Follow-up recommended in five years.     COLONOSCOPY  2013,Normal, hemorrhoids - follow up 5 years for family history of colon cnacer     HYSTERECTOMY TOTAL ABDOMINAL      2007,Laparoscopic supracervical hysterectomy and bilateral salpingo-oophorectomy for bleeding fibroid     LAPAROSCOPIC BYPASS GASTRIC           LAPAROSCOPIC TUBAL LIGATION      1985     LOBECTOMY LUNG  2018    VATS - LOBECTOMY UPPER LUNG, [place], Dr Kohler     LUMPECTOMY BREAST      Lumpectomy of the right breast     RELEASE CARPAL TUNNEL Left 2019    Procedure: RELEASE CARPAL TUNNEL;  Surgeon: Andrew Sahni MD;  Location: [facility] OR     RELEASE CARPAL TUNNEL Right 2019    Procedure: Open Carpal Tunnel Release;  Surgeon: Andrew Sahni MD;  Location: [facility] OR     SKIN GRAFT, EACH ADDN 100SQCM  2007    Skin grafting post right ankle fracture        FAMILY HISTORY:   Family History   Problem Relation Age of Onset     Heart Disease Father 36        Heart Disease,MI  - Quadruple bypass     Hypertension Father         Hypertension     Other - See Comments Father         Obesity     Colon Cancer Mother 40        Cancer-colon,surgical resection--remission     Hypertension Mother         Hypertension     Other - See Comments Mother         Obesity     Colon Cancer Maternal Grandmother         Cancer-colon     Thyroid Disease Sister         Thyroid Disease     Breast Cancer Paternal Aunt 60        Cancer-breast,        SOCIAL HISTORY:   Social History     Socioeconomic History     Marital status:      Spouse name: noris     Number of children: Not on file     Years of education: 11 GED     Highest education level: Not on file   Occupational  History     Not on file   Social Needs     Financial resource strain: Not on file     Food insecurity     Worry: Not on file     Inability: Not on file     Transportation needs     Medical: Not on file     Non-medical: Not on file   Tobacco Use     Smoking status: Light Tobacco Smoker     Packs/day: 0.00     Years: 43.00     Pack years: 0.00     Types: Cigarettes     Smokeless tobacco: Never Used     Tobacco comment: 5 per day   Substance and Sexual Activity     Alcohol use: Yes     Alcohol/week: 0.0 standard drinks     Comment: Alcoholic Drinks/day: 4-5 beers and 1/2 pt vodka daily     Drug use: No     Comment: Drug use: No     Sexual activity: Yes     Partners: Male     Birth control/protection: Surgical   Lifestyle     Physical activity     Days per week: Not on file     Minutes per session: Not on file     Stress: Not on file   Relationships     Social connections     Talks on phone: Not on file     Gets together: Not on file     Attends Druze service: Not on file     Active member of club or organization: Not on file     Attends meetings of clubs or organizations: Not on file     Relationship status: Not on file     Intimate partner violence     Fear of current or ex partner: Not on file     Emotionally abused: Not on file     Physically abused: Not on file     Forced sexual activity: Not on file   Other Topics Concern     Parent/sibling w/ CABG, MI or angioplasty before 65F 55M? Not Asked   Social History Narrative    . Drinks 6 beers daily.          ROS:   CONSTITUTIONAL: No weight loss, fever, chills, weakness but admits to fatigue.   HEENT: Eyes: No visual changes. Ears, Nose, Throat: No hearing loss, congestion or difficulty swallowing.   CARDIOVASCULAR: No chest pain, chest pressure or chest discomfort.  He admits to palpitations but no lower extremity edema.   RESPIRATORY: No shortness of breath, dyspnea upon exertion, cough or sputum production.   GASTROINTESTINAL: No abdominal pain. No  anorexia, nausea, vomiting or diarrhea.   NEUROLOGICAL: No headache, lightheadedness, dizziness, syncope, ataxia or weakness.   HEMATOLOGIC: No anemia, bleeding or bruising.   PSYCHIATRIC: (+) history of depression or anxiety.   ENDOCRINOLOGIC: No reports of sweating, cold or heat intolerance. No polyuria or polydipsia.   SKIN: No abnormal rashes or itching.       PHYSICAL EXAM:   GENERAL: The patient is a well-developed, well-nourished, in no apparent distress. Alert and oriented x3.   HEENT: Head is normocephalic and atraumatic. Eyes are symmetrical with normal visual tracking.  HEART: Regular rate and rhythm, S1S2 preset, (+) murmur, no rub or gallop.   LUNGS: Respirations regular and unlabored. Clear to auscultation.   GI: Abdomen is soft and nondistended.   EXTREMITIES: Scant peripheral edema present.   MUSCULOSKELETAL: No joint swelling.   NEUROLOGIC: Alert and oriented X3.    SKIN: No jaundice. No rashes or visible skin lesions present.        LAB RESULTS:   Office Visit on 08/27/2020   Component Date Value Ref Range Status     Interpretation ECG 08/27/2020 Click View Image link to view waveform and result   Final     TSH Reflex 08/27/2020 2.74  0.34 - 5.60 IU/mL Final     Rheumatoid Factor 08/27/2020 <14  <14 IU/mL Final     Sed Rate 08/27/2020 8  1 - 15 mm/h Final     CRP Inflammation 08/27/2020 0.1  <0.5 mg/L Final     Lyme Disease Antibodies Serum 08/27/2020 0.04  0.00 - 0.89 Final     WBC 08/27/2020 6.4  4.0 - 11.0 10e9/L Final     RBC Count 08/27/2020 4.29  3.8 - 5.2 10e12/L Final     Hemoglobin 08/27/2020 12.2  11.7 - 15.7 g/dL Final     Hematocrit 08/27/2020 38.4  35.0 - 47.0 % Final     MCV 08/27/2020 90  78 - 100 fl Final     MCH 08/27/2020 28.4  26.5 - 33.0 pg Final     MCHC 08/27/2020 31.8  31.5 - 36.5 g/dL Final     RDW 08/27/2020 15.6* 10.0 - 15.0 % Final     Platelet Count 08/27/2020 255  150 - 450 10e9/L Final     Diff Method 08/27/2020 Automated Method   Final     % Neutrophils 08/27/2020  60.1  % Final     % Lymphocytes 08/27/2020 27.5  % Final     % Monocytes 08/27/2020 7.2  % Final     % Eosinophils 08/27/2020 4.2  % Final     % Basophils 08/27/2020 0.8  % Final     % Immature Granulocytes 08/27/2020 0.2  % Final     Absolute Neutrophil 08/27/2020 3.8  1.6 - 8.3 10e9/L Final     Absolute Lymphocytes 08/27/2020 1.8  0.8 - 5.3 10e9/L Final     Absolute Monocytes 08/27/2020 0.5  0.0 - 1.3 10e9/L Final     Absolute Eosinophils 08/27/2020 0.3  0.0 - 0.7 10e9/L Final     Absolute Basophils 08/27/2020 0.1  0.0 - 0.2 10e9/L Final     Abs Immature Granulocytes 08/27/2020 0.0  0 - 0.4 10e9/L Final     Iron 08/27/2020 59  50 - 212 ug/dL Final     UIBC (Unsaturated) 08/27/2020 342.80  mg/dL Final     Iron Binding Capacity 08/27/2020 401.80* 245.00 - 400.00 ug/dL Final     Iron Saturation 08/27/2020 15* 20 - 55 % Final          ASSESSMENT:       ICD-10-CM    1. Moderate mitral regurgitation  I34.0 Transesophageal Echocardiogram     N terminal pro BNP outpatient   2. PVC's (premature ventricular contractions)  I49.3 CARDIOLOGY EVAL ADULT REFERRAL     EKG 12-lead, tracing only     Zio Patch Holter Adult Pediatric Greater than 48 hrs     Magnesium     N terminal pro BNP outpatient   3. Hypokalemia  E87.6 Comprehensive metabolic panel     N terminal pro BNP outpatient   4. COPD  J43.8 N terminal pro BNP outpatient   5. Lung nodule < 6cm on CT  R91.1 N terminal pro BNP outpatient   6. Essential hypertension  I10 N terminal pro BNP outpatient   7. Mixed hyperlipidemia  E78.2 N terminal pro BNP outpatient   8. Tobacco abuse  Z72.0 N terminal pro BNP outpatient   9. Tobacco abuse counseling  Z71.6 N terminal pro BNP outpatient   10. Bilateral enlargement of atria  I51.7 N terminal pro BNP outpatient   11. Irregular heart rate  I49.9 Zio Patch Holter Adult Pediatric Greater than 48 hrs     Magnesium     Vitamin B12     Folate     N terminal pro BNP outpatient         PLAN:   1.  Related to an EKG showing multiple PVCs  on 8/27/2020, multiple months of an irregular heart rate suspicious for PVCs, will obtain a ZIO Patch.  2.  Related to her echocardiogram that shows moderate mitral insufficiency, will obtain a GUSTAVO.  Patient is planning on going to the [facility] as has family there.  Patient would prefer to go to the [facility].  3.  Labs today which will include CMP, magnesium, BNP, B12, and folate.  Potassium low at 2.6 on 7/6/20.  Labs have not been rechecked.  It is possible that her low potassium is causing palpitations.  Will replace with potassium if found to be abnormal.  4.  EKG with sinus rhythm without PVCs.  5.  Reviewed her echo from 9/14/2020 that shows moderate mitral regurgitation with severe biatrial enlargement.  EF low normal at 50-55%.  6.  Follow-up after placement of ZIO Patch, GUSTAVO, and labs which will be approximately 6 weeks.      Thank you for allowing me to participate in the care of your patient. Please do not hesitate to contact me if you have any questions.     Jose Guadalupe Lechuga, DO

## 2020-09-30 ENCOUNTER — HOSPITAL ENCOUNTER (OUTPATIENT)
Dept: CARDIOLOGY | Facility: OTHER | Age: 60
Discharge: HOME | End: 2020-09-30
Attending: INTERNAL MEDICINE | Admitting: INTERNAL MEDICINE
Payer: COMMERCIAL

## 2020-09-30 DIAGNOSIS — I49.9 IRREGULAR HEART RATE: ICD-10-CM

## 2020-09-30 DIAGNOSIS — I49.3 PVC'S (PREMATURE VENTRICULAR CONTRACTIONS): ICD-10-CM

## 2020-09-30 PROCEDURE — 0298T LEADLESS EKG MONITOR 3 TO 14 DAYS: CPT | Performed by: INTERNAL MEDICINE

## 2020-09-30 PROCEDURE — 0296T LEADLESS EKG MONITOR 3 TO 14 DAYS: CPT

## 2020-10-01 ENCOUNTER — NURSE TRIAGE (OUTPATIENT)
Dept: NURSING | Facility: CLINIC | Age: 60
End: 2020-10-01

## 2020-10-01 ENCOUNTER — APPOINTMENT (OUTPATIENT)
Dept: GENERAL RADIOLOGY | Facility: OTHER | Age: 60
End: 2020-10-01
Attending: STUDENT IN AN ORGANIZED HEALTH CARE EDUCATION/TRAINING PROGRAM
Payer: COMMERCIAL

## 2020-10-01 ENCOUNTER — HOSPITAL ENCOUNTER (EMERGENCY)
Facility: OTHER | Age: 60
Discharge: HOME | End: 2020-10-01
Attending: STUDENT IN AN ORGANIZED HEALTH CARE EDUCATION/TRAINING PROGRAM | Admitting: STUDENT IN AN ORGANIZED HEALTH CARE EDUCATION/TRAINING PROGRAM
Payer: COMMERCIAL

## 2020-10-01 VITALS
DIASTOLIC BLOOD PRESSURE: 82 MMHG | OXYGEN SATURATION: 95 % | RESPIRATION RATE: 13 BRPM | BODY MASS INDEX: 22.53 KG/M2 | WEIGHT: 132 LBS | HEIGHT: 64 IN | TEMPERATURE: 98.3 F | HEART RATE: 73 BPM | SYSTOLIC BLOOD PRESSURE: 127 MMHG

## 2020-10-01 DIAGNOSIS — R42 DIZZINESS: ICD-10-CM

## 2020-10-01 LAB
ANION GAP SERPL CALCULATED.3IONS-SCNC: 8 MMOL/L (ref 3–14)
BASOPHILS # BLD AUTO: 0.1 10E9/L (ref 0–0.2)
BASOPHILS NFR BLD AUTO: 0.8 %
BUN SERPL-MCNC: 14 MG/DL (ref 7–25)
CALCIUM SERPL-MCNC: 8.8 MG/DL (ref 8.6–10.3)
CHLORIDE SERPL-SCNC: 104 MMOL/L (ref 98–107)
CO2 SERPL-SCNC: 28 MMOL/L (ref 21–31)
CREAT SERPL-MCNC: 0.88 MG/DL (ref 0.6–1.2)
DIFFERENTIAL METHOD BLD: ABNORMAL
EOSINOPHIL # BLD AUTO: 0.4 10E9/L (ref 0–0.7)
EOSINOPHIL NFR BLD AUTO: 6.1 %
ERYTHROCYTE [DISTWIDTH] IN BLOOD BY AUTOMATED COUNT: 15.9 % (ref 10–15)
GFR SERPL CREATININE-BSD FRML MDRD: 66 ML/MIN/{1.73_M2}
GLUCOSE SERPL-MCNC: 90 MG/DL (ref 70–105)
HCT VFR BLD AUTO: 34.7 % (ref 35–47)
HGB BLD-MCNC: 11.2 G/DL (ref 11.7–15.7)
IMM GRANULOCYTES # BLD: 0 10E9/L (ref 0–0.4)
IMM GRANULOCYTES NFR BLD: 0.2 %
LYMPHOCYTES # BLD AUTO: 2 10E9/L (ref 0.8–5.3)
LYMPHOCYTES NFR BLD AUTO: 33.3 %
MCH RBC QN AUTO: 28 PG (ref 26.5–33)
MCHC RBC AUTO-ENTMCNC: 32.3 G/DL (ref 31.5–36.5)
MCV RBC AUTO: 87 FL (ref 78–100)
MONOCYTES # BLD AUTO: 0.5 10E9/L (ref 0–1.3)
MONOCYTES NFR BLD AUTO: 7.7 %
NEUTROPHILS # BLD AUTO: 3.2 10E9/L (ref 1.6–8.3)
NEUTROPHILS NFR BLD AUTO: 51.9 %
PLATELET # BLD AUTO: 238 10E9/L (ref 150–450)
POTASSIUM SERPL-SCNC: 4.5 MMOL/L (ref 3.5–5.1)
RBC # BLD AUTO: 4 10E12/L (ref 3.8–5.2)
SODIUM SERPL-SCNC: 140 MMOL/L (ref 134–144)
TROPONIN I SERPL-MCNC: 4.5 PG/ML
WBC # BLD AUTO: 6.1 10E9/L (ref 4–11)

## 2020-10-01 PROCEDURE — 84484 ASSAY OF TROPONIN QUANT: CPT | Performed by: STUDENT IN AN ORGANIZED HEALTH CARE EDUCATION/TRAINING PROGRAM

## 2020-10-01 PROCEDURE — 93010 ELECTROCARDIOGRAM REPORT: CPT | Performed by: INTERNAL MEDICINE

## 2020-10-01 PROCEDURE — 71046 X-RAY EXAM CHEST 2 VIEWS: CPT | Mod: TC

## 2020-10-01 PROCEDURE — 93005 ELECTROCARDIOGRAM TRACING: CPT | Performed by: STUDENT IN AN ORGANIZED HEALTH CARE EDUCATION/TRAINING PROGRAM

## 2020-10-01 PROCEDURE — 99283 EMERGENCY DEPT VISIT LOW MDM: CPT | Performed by: STUDENT IN AN ORGANIZED HEALTH CARE EDUCATION/TRAINING PROGRAM

## 2020-10-01 PROCEDURE — 85025 COMPLETE CBC W/AUTO DIFF WBC: CPT | Performed by: STUDENT IN AN ORGANIZED HEALTH CARE EDUCATION/TRAINING PROGRAM

## 2020-10-01 PROCEDURE — 99285 EMERGENCY DEPT VISIT HI MDM: CPT | Mod: 25 | Performed by: STUDENT IN AN ORGANIZED HEALTH CARE EDUCATION/TRAINING PROGRAM

## 2020-10-01 PROCEDURE — 36415 COLL VENOUS BLD VENIPUNCTURE: CPT | Performed by: STUDENT IN AN ORGANIZED HEALTH CARE EDUCATION/TRAINING PROGRAM

## 2020-10-01 PROCEDURE — 250N000011 HC RX IP 250 OP 636: Performed by: STUDENT IN AN ORGANIZED HEALTH CARE EDUCATION/TRAINING PROGRAM

## 2020-10-01 PROCEDURE — 80048 BASIC METABOLIC PNL TOTAL CA: CPT | Performed by: STUDENT IN AN ORGANIZED HEALTH CARE EDUCATION/TRAINING PROGRAM

## 2020-10-01 PROCEDURE — 96374 THER/PROPH/DIAG INJ IV PUSH: CPT | Performed by: STUDENT IN AN ORGANIZED HEALTH CARE EDUCATION/TRAINING PROGRAM

## 2020-10-01 RX ORDER — ONDANSETRON 2 MG/ML
4 INJECTION INTRAMUSCULAR; INTRAVENOUS ONCE
Status: COMPLETED | OUTPATIENT
Start: 2020-10-01 | End: 2020-10-01

## 2020-10-01 RX ADMIN — ONDANSETRON HYDROCHLORIDE 4 MG: 2 SOLUTION INTRAMUSCULAR; INTRAVENOUS at 21:19

## 2020-10-01 ASSESSMENT — MIFFLIN-ST. JEOR: SCORE: 1158.75

## 2020-10-01 NOTE — ED AVS SNAPSHOT
[facility]  [address]  [place] [address]  Phone: [phone]  Fax: [phone]                                    Lisy Telles   MRN: [id number]    Department: [facility]   Date of Visit: 10/1/2020           After Visit Summary Signature Page    I have received my discharge instructions, and my questions have been answered. I have discussed any challenges I see with this plan with the nurse or doctor.    ..........................................................................................................................................  Patient/Patient Representative Signature      ..........................................................................................................................................  Patient Representative Print Name and Relationship to Patient    ..................................................               ................................................  Date                                   Time    ..........................................................................................................................................  Reviewed by Signature/Title    ...................................................              ..............................................  Date                                               Time          22EPIC Rev 08/18

## 2020-10-02 ENCOUNTER — OFFICE VISIT (OUTPATIENT)
Dept: FAMILY MEDICINE | Facility: OTHER | Age: 60
End: 2020-10-02
Attending: FAMILY MEDICINE
Payer: COMMERCIAL

## 2020-10-02 VITALS
BODY MASS INDEX: 22.42 KG/M2 | OXYGEN SATURATION: 94 % | RESPIRATION RATE: 17 BRPM | HEART RATE: 82 BPM | TEMPERATURE: 98.7 F | WEIGHT: 130.6 LBS | SYSTOLIC BLOOD PRESSURE: 118 MMHG | DIASTOLIC BLOOD PRESSURE: 80 MMHG

## 2020-10-02 DIAGNOSIS — M79.10 MYALGIA: Primary | ICD-10-CM

## 2020-10-02 DIAGNOSIS — D50.9 IRON DEFICIENCY ANEMIA, UNSPECIFIED IRON DEFICIENCY ANEMIA TYPE: ICD-10-CM

## 2020-10-02 DIAGNOSIS — Z23 NEEDS FLU SHOT: ICD-10-CM

## 2020-10-02 DIAGNOSIS — R53.83 FATIGUE, UNSPECIFIED TYPE: ICD-10-CM

## 2020-10-02 LAB
CK SERPL-CCNC: 79 U/L (ref 30–223)
CRP SERPL-MCNC: 1.3 MG/L
DEPRECATED CALCIDIOL+CALCIFEROL SERPL-MC: 29.4 NG/ML
ERYTHROCYTE [SEDIMENTATION RATE] IN BLOOD BY WESTERGREN METHOD: 14 MM/H (ref 1–15)
INTERPRETATION ECG - MUSE: NORMAL
IRON SATN MFR SERPL: 11 % (ref 20–55)
IRON SERPL-MCNC: 51 UG/DL (ref 50–212)
TIBC SERPL-MCNC: 452.2 UG/DL (ref 245–400)
UIBC (UNSATURATED): 401.2 MG/DL

## 2020-10-02 PROCEDURE — 86140 C-REACTIVE PROTEIN: CPT | Mod: ZL | Performed by: FAMILY MEDICINE

## 2020-10-02 PROCEDURE — G0463 HOSPITAL OUTPT CLINIC VISIT: HCPCS

## 2020-10-02 PROCEDURE — 82306 VITAMIN D 25 HYDROXY: CPT | Mod: ZL | Performed by: FAMILY MEDICINE

## 2020-10-02 PROCEDURE — 99214 OFFICE O/P EST MOD 30 MIN: CPT | Mod: 25 | Performed by: FAMILY MEDICINE

## 2020-10-02 PROCEDURE — 90471 IMMUNIZATION ADMIN: CPT | Performed by: FAMILY MEDICINE

## 2020-10-02 PROCEDURE — 83540 ASSAY OF IRON: CPT | Mod: ZL | Performed by: FAMILY MEDICINE

## 2020-10-02 PROCEDURE — 83550 IRON BINDING TEST: CPT | Mod: ZL | Performed by: FAMILY MEDICINE

## 2020-10-02 PROCEDURE — 90686 IIV4 VACC NO PRSV 0.5 ML IM: CPT | Performed by: FAMILY MEDICINE

## 2020-10-02 PROCEDURE — 86803 HEPATITIS C AB TEST: CPT | Mod: ZL | Performed by: FAMILY MEDICINE

## 2020-10-02 PROCEDURE — 86038 ANTINUCLEAR ANTIBODIES: CPT | Mod: ZL | Performed by: FAMILY MEDICINE

## 2020-10-02 PROCEDURE — 82550 ASSAY OF CK (CPK): CPT | Mod: ZL | Performed by: FAMILY MEDICINE

## 2020-10-02 PROCEDURE — 86618 LYME DISEASE ANTIBODY: CPT | Mod: ZL | Performed by: FAMILY MEDICINE

## 2020-10-02 PROCEDURE — 36415 COLL VENOUS BLD VENIPUNCTURE: CPT | Mod: ZL | Performed by: FAMILY MEDICINE

## 2020-10-02 PROCEDURE — 86747 PARVOVIRUS ANTIBODY: CPT | Mod: ZL | Performed by: FAMILY MEDICINE

## 2020-10-02 PROCEDURE — 85652 RBC SED RATE AUTOMATED: CPT | Mod: ZL | Performed by: FAMILY MEDICINE

## 2020-10-02 ASSESSMENT — ENCOUNTER SYMPTOMS
FREQUENCY: 0
SHORTNESS OF BREATH: 0
SINUS PRESSURE: 0
UNEXPECTED WEIGHT CHANGE: 0
DIZZINESS: 1
CHILLS: 0
PSYCHIATRIC NEGATIVE: 1
VOMITING: 0
WOUND: 0
COLOR CHANGE: 0
ARTHRALGIAS: 0
NAUSEA: 0
WEAKNESS: 0
DYSURIA: 0
PALPITATIONS: 0
FEVER: 0
BLOOD IN STOOL: 0
ABDOMINAL PAIN: 0
COUGH: 0
DIARRHEA: 0
LIGHT-HEADEDNESS: 1
HEADACHES: 0
MYALGIAS: 0

## 2020-10-02 ASSESSMENT — ANXIETY QUESTIONNAIRES
2. NOT BEING ABLE TO STOP OR CONTROL WORRYING: NOT AT ALL
1. FEELING NERVOUS, ANXIOUS, OR ON EDGE: NOT AT ALL
7. FEELING AFRAID AS IF SOMETHING AWFUL MIGHT HAPPEN: NOT AT ALL
6. BECOMING EASILY ANNOYED OR IRRITABLE: NOT AT ALL
GAD7 TOTAL SCORE: 0
5. BEING SO RESTLESS THAT IT IS HARD TO SIT STILL: NOT AT ALL
3. WORRYING TOO MUCH ABOUT DIFFERENT THINGS: NOT AT ALL

## 2020-10-02 ASSESSMENT — PATIENT HEALTH QUESTIONNAIRE - PHQ9
5. POOR APPETITE OR OVEREATING: NOT AT ALL
SUM OF ALL RESPONSES TO PHQ QUESTIONS 1-9: 3

## 2020-10-02 ASSESSMENT — PAIN SCALES - GENERAL: PAINLEVEL: MODERATE PAIN (5)

## 2020-10-02 NOTE — ED NOTES
Ambulated in the hallway with patient.  She was able to tolerate without significant dizziness.  She states that she is ready to go home.  Son-in-law was called and notified of pending discharge.   Ning Vernon RN on 10/1/2020 at 11:23 PM

## 2020-10-02 NOTE — ED NOTES
Called Daughter Amanda and gave her an update.  Please call at [phone] for updates and at discharge.   Ning Vernon RN on 10/1/2020 at 8:55 PM

## 2020-10-02 NOTE — NURSING NOTE
Pt presents to clinic today for hospital follow up and cardiology follow up.      Medication Reconciliation: complete  Estefanía Sahni LPN

## 2020-10-02 NOTE — ED TRIAGE NOTES
"Pt states she has been lightheaded for the past couple days, but tonight it has lasted approx. 2 hours.  Pt also reports nausea.  Pt states she \"has a leaky valve\".  Pt reports \"throwing PVCs\"  Per her provider she needs to come in with these issues.  Pt reports SOB, but does have COPD.  "

## 2020-10-02 NOTE — TELEPHONE ENCOUNTER
Daughter of pt calls in thinking she is calling the ED at [facility]    Number for ED given to daughter    Protocol and care advice reviewed  Caller states understanding of the recommended disposition  Advised to call back if further questions or concerns    Abhay Land , RN / [facility]        Additional Information    General information question, no triage required and triager able to answer question    Protocols used: INFORMATION ONLY CALL-A-AH

## 2020-10-02 NOTE — PROGRESS NOTES
Nursing Notes:   Estefanía Sahni LPN  10/2/2020  2:52 PM  Sign at exiting of workspace  Pt presents to clinic today for hospital follow up and cardiology follow up.      Medication Reconciliation: complete  Estefanía Sahni LPN        SUBJECTIVE:  60 year old female presents to follow up on lightheadedness.  In ED yesterday for lightheadedness for a few hours. Felt nauseated. No nystagmus  Has a Zio patch in place  EKG has shown sinus rhythm with frequent PVCs  Moderate mitral regurgitation seen on ECHO  She is not concerned about the lightheadedness as she is better, but worried she may have Lyme disease or something causing her lightheadedness as she is also reporting pain in both arms for over a month. Lyme, ESR, and CRP were negative end of August..  Cannot put things in the microwave due to pain  Some slight discomfort in thighs  Stiffness in feet and ankles in the morning.   No RA, lupus, etc in family.  No headache or vision loss  No URI symptoms in the past couple months      REVIEW OF SYSTEMS:    Pertinent items are noted in HPI.    Current Outpatient Medications   Medication Sig Dispense Refill     albuterol (2.5 MG/3ML) 0.083% neb solution Take 1 vial (2.5 mg) by nebulization 4 times daily 360 mL 3     albuterol (PROAIR HFA) 108 (90 Base) MCG/ACT inhaler Inhale 2 puffs into the lungs 4 times daily as needed 3 Inhaler 0     alendronate (FOSAMAX) 70 MG tablet Take 1 tablet (70 mg) by mouth every 7 days Take in the morning on empty stomach with full glass of water. Do not lie down for 1 hr. 13 tablet 3     azelastine (ASTEPRO) 0.15 % nasal spray Spray 2 sprays into both nostrils daily 30 mL 11     diclofenac (VOLTAREN) 1 % topical gel Apply 2 g topically 4 times daily 150 g 1     ferrous sulfate (FEROSUL) 325 (65 Fe) MG tablet Take 325 mg by mouth       fluticasone (FLONASE) 50 MCG/ACT nasal spray Spray 1 spray into both nostrils daily 18 g 11     gabapentin (NEURONTIN) 400 MG capsule Take 1 capsule (400 mg)  by mouth daily AND 2 capsules (800 mg) At Bedtime. 270 capsule 3     hydrOXYzine (ATARAX) 10 MG tablet Take 1-2 tablets (10-20 mg) by mouth 3 times daily as needed 60 tablet 3     lisinopril (PRINIVIL/ZESTRIL) 5 MG tablet Take 1 tablet (5 mg) by mouth daily 90 tablet 4     sodium chloride 3 % NEBU neb solution INHALE 3ML AS DIRECTED       traZODone (DESYREL) 150 MG tablet Take 1 tablet (150 mg) by mouth At Bedtime 90 tablet 3     traZODone (DESYREL) 50 MG tablet Take 50 mg by mouth       venlafaxine (EFFEXOR-XR) 150 MG 24 hr capsule Take 2 capsules (300 mg) by mouth daily 180 capsule 3     No Known Allergies    OBJECTIVE:  /80   Pulse 82   Temp 98.7  F (37.1  C) (Temporal)   Resp 17   Wt 59.2 kg (130 lb 9.6 oz)   SpO2 94%   BMI 22.42 kg/m      EXAM:  General Appearance: Alert. No acute distress  Eyes: No nystagmus  Chest/Respiratory Exam: Clear to auscultation bilaterally  Cardiovascular Exam: Regular rate and rhythm. S1, S2, no murmur, gallop, or rubs. Zio in place  Musculoskeletal: No synovitis or tenderness to joints. Tender to palpation over upper arms. Nontender in thighs  Extremities: 2+ pedal pulses.  No lower extremity edema.  Psychiatric: Normal affect and mentation        ASSESSMENT/PLAN:    ICD-10-CM    1. Myalgia  M79.10 CRP inflammation     Sedimentation Rate (ESR)     Lyme Disease Ab with reflex to WB Serum     Anti Nuclear Blanca IgG by IFA with Reflex     Parvovirus B19 antibodies IgG IgM     CK Total     Hepatitis C Screen Reflex to HCV RNA Quant and Genotype     Vitamin D Total     Vitamin D Total     Hepatitis C Screen Reflex to HCV RNA Quant and Genotype     CK Total     Parvovirus B19 antibodies IgG IgM     Lyme Disease Ab with reflex to WB Serum     Sedimentation Rate (ESR)     CRP inflammation   2. Fatigue, unspecified type  R53.83 Vitamin D Total     Vitamin D Total   3. Iron deficiency anemia, unspecified iron deficiency anemia type  D50.9 Iron Binding Panel     omeprazole  (PRILOSEC) 20 MG DR capsule     Iron Binding Panel   4. Needs flu shot  Z23 [facility]-IMM- FLU VAC PRESRV FREE QUAD SPLIT VIR > 6 MONTHS IM       She is concerned that another tick disease has caused symptoms, but that is not likely. We will check for myalgia and arthralgia causes.     Hemoglobin was low in ED, was normal previous, but overdoses on trazodone and alcohol. Could have gastritis. On iron and omeprazole.    Given flu shot    Follow-up pending labs. May consult rheumatology even with negative results given amount of pain. Likely follow up before referral    Greater than 50% of this 28 minute appointment spent on counseling     Silvino Peace MD    This document was prepared using a combination of typing and voice generated software.  While every attempt was made for accuracy, spelling and grammatical errors may exist.

## 2020-10-02 NOTE — DISCHARGE INSTRUCTIONS
As discussed, your workup was reassuring. Continue to consume plenty of fluids. If you get considerably worse overnight please return, otherwise follow up with your provider at planned appointment tomorrow.

## 2020-10-02 NOTE — ED NOTES
"Daughter Valentine called.  She stated that she want the provider to know that her mom was rude to both her and her dad which is not like her.  Daughter is worried about \"mini strokes\".  Provider was notified of daughter concerns.    Ning Vernon RN on 10/1/2020 at 11:12 PM    "

## 2020-10-02 NOTE — ED PROVIDER NOTES
History     Chief Complaint   Patient presents with     Dizziness     Nausea     Shortness of Breath     HPI  Lisy Telles is a 60 year old female who presents with dizziness.  Patient reports periodic short-lived, self resolving episodes of dizziness over the past several days lasting several minutes.  However this evening she developed persistent dizziness but did not resolve lasting several hours.  She follows with a cardiologist for a possible arrhythmia for which she is wearing a Zio patch, and her cardiologist recommended that she present to the ED if she developed persistent dizziness.  Her dizziness is described as lightheadedness and denies any sense of vertigo or provoking of symptoms with positional changes.  Denies dehydration, fever, chills, chest pain, cough, new onset dyspnea beyond her baseline COPD dyspnea, diarrhea, dysuria, imbalance.  She does have appointment with her main provider tomorrow.    Allergies:  No Known Allergies    Problem List:    Patient Active Problem List    Diagnosis Date Noted     PVC's (premature ventricular contractions) 09/29/2020     Priority: Medium     Moderate mitral regurgitation 09/29/2020     Priority: Medium     Hypokalemia 09/29/2020     Priority: Medium     Mixed hyperlipidemia 09/29/2020     Priority: Medium     Tobacco abuse counseling 09/29/2020     Priority: Medium     Bilateral enlargement of atria 09/29/2020     Priority: Medium     Irregular heart rate 09/29/2020     Priority: Medium     S/p bilateral carpal tunnel release 04/16/2019     Priority: Medium     Right carpal tunnel syndrome 03/27/2019     Priority: Medium     Bilateral carpal tunnel syndrome 12/04/2018     Priority: Medium     De Quervain's disease (tenosynovitis) 12/04/2018     Priority: Medium     Restless legs syndrome (RLS) 06/29/2018     Priority: Medium     Anxiety 01/24/2018     Priority: Medium     COPD 01/24/2018     Priority: Medium     Alcoholism in recovery (H) 10/05/2017      Priority: Medium     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) 10/05/2017     Priority: Medium     Lung nodule < 6cm on CT 10/05/2017     Priority: Medium     Age-related osteoporosis with current pathological fracture with routine healing 09/20/2017     Priority: Medium     Bronchiectasis with acute lower respiratory infection (H) 09/13/2017     Priority: Medium     Tobacco abuse 09/11/2017     Priority: Medium     Axillary nerve palsy 06/24/2016     Priority: Medium     Closed 4-part fracture of proximal end of left humerus 06/07/2016     Priority: Medium     Liver laceration, grade IV, with open wound into cavity 09/04/2015     Priority: Medium     Pneumothorax, closed, traumatic 09/04/2015     Priority: Medium     Spleen laceration 09/04/2015     Priority: Medium     Traumatic brain injury with loss of consciousness of 30 minutes or less (H) 09/04/2015     Priority: Medium     Alcohol withdrawal (H) 04/08/2015     Priority: Medium     Essential hypertension 07/14/2014     Priority: Medium     Sprain of unspecified site of back 08/15/2012     Priority: Medium     Recurrent major depression in partial remission (H) 10/13/2010     Priority: Medium        Past Medical History:    Past Medical History:   Diagnosis Date     Age-related osteoporosis with current pathological fracture with routine healing      Anxiety disorder      Appendicitis      Benign neoplasm of connective and other soft tissue, unspecified      Bilateral carpal tunnel syndrome      Brachial plexus disorders      Chronic obstructive pulmonary disease (H)      Essential (primary) hypertension      Major depressive disorder, single episode      Other displaced fracture of upper end of left humerus, subsequent encounter for fracture with routine healing      Other fracture of unspecified lower leg, initial encounter for closed fracture      Personal history of other medical treatment (CODE)      Pure hypercholesterolemia      Rectal polyp       Tobacco use      Uncomplicated alcohol dependence (H)        Past Surgical History:    Past Surgical History:   Procedure Laterality Date     BRONCHOSCOPY DIAGNOSTIC  2018    MAC infection     CLOSED REDUCTION ANKLE      07,Open reduction/internal fixation, right ankle fracture 07.     COLONOSCOPY      ,screening for family history,  follow-up recommended in five years.     COLONOSCOPY      ,Colonoscopy 2007.  Follow-up recommended in five years.     COLONOSCOPY  2013,Normal, hemorrhoids - follow up 5 years for family history of colon cnacer     HYSTERECTOMY TOTAL ABDOMINAL      2007,Laparoscopic supracervical hysterectomy and bilateral salpingo-oophorectomy for bleeding fibroid     LAPAROSCOPIC BYPASS GASTRIC           LAPAROSCOPIC TUBAL LIGATION      1985     LOBECTOMY LUNG  2018    VATS - LOBECTOMY UPPER LUNG, [place], Dr Kohler     LUMPECTOMY BREAST      Lumpectomy of the right breast     RELEASE CARPAL TUNNEL Left 2019    Procedure: RELEASE CARPAL TUNNEL;  Surgeon: Andrew Sahni MD;  Location: [facility] OR     RELEASE CARPAL TUNNEL Right 2019    Procedure: Open Carpal Tunnel Release;  Surgeon: Andrew Sahni MD;  Location: [facility] OR     SKIN GRAFT, EACH ADDN 100SQCM  2007    Skin grafting post right ankle fracture       Family History:    Family History   Problem Relation Age of Onset     Heart Disease Father 36        Heart Disease,MI  - Quadruple bypass     Hypertension Father         Hypertension     Other - See Comments Father         Obesity     Colon Cancer Mother 40        Cancer-colon,surgical resection--remission     Hypertension Mother         Hypertension     Other - See Comments Mother         Obesity     Colon Cancer Maternal Grandmother         Cancer-colon     Thyroid Disease Sister         Thyroid Disease     Breast Cancer Paternal Aunt 60        Cancer-breast,       Social History:  Marital Status:    "[2]  Social History     Tobacco Use     Smoking status: Light Tobacco Smoker     Packs/day: 0.00     Years: 43.00     Pack years: 0.00     Types: Cigarettes     Smokeless tobacco: Never Used     Tobacco comment: 5 per day   Substance Use Topics     Alcohol use: Not Currently     Alcohol/week: 0.0 standard drinks     Drug use: No     Comment: Drug use: No        Medications:         albuterol (2.5 MG/3ML) 0.083% neb solution       albuterol (PROAIR HFA) 108 (90 Base) MCG/ACT inhaler       alendronate (FOSAMAX) 70 MG tablet       azelastine (ASTEPRO) 0.15 % nasal spray       diclofenac (VOLTAREN) 1 % topical gel       ferrous sulfate (FEROSUL) 325 (65 Fe) MG tablet       fluticasone (FLONASE) 50 MCG/ACT nasal spray       gabapentin (NEURONTIN) 400 MG capsule       hydrOXYzine (ATARAX) 10 MG tablet       venlafaxine (EFFEXOR-XR) 150 MG 24 hr capsule       lisinopril (PRINIVIL/ZESTRIL) 5 MG tablet       sodium chloride 3 % NEBU neb solution       traZODone (DESYREL) 150 MG tablet       traZODone (DESYREL) 50 MG tablet          Review of Systems   Constitutional: Negative for chills, fever and unexpected weight change.   HENT: Negative for sinus pressure.    Eyes: Negative for visual disturbance.   Respiratory: Negative for cough and shortness of breath.    Cardiovascular: Negative for chest pain, palpitations and leg swelling.   Gastrointestinal: Negative for abdominal pain, blood in stool, diarrhea, nausea and vomiting.   Genitourinary: Negative for dysuria, frequency and urgency.   Musculoskeletal: Negative for arthralgias and myalgias.   Skin: Negative for color change, rash and wound.   Neurological: Positive for dizziness and light-headedness. Negative for syncope, weakness and headaches.   Psychiatric/Behavioral: Negative.        Physical Exam   BP: (!) 151/82  Pulse: 77  Temp: 98.3  F (36.8  C)  Resp: 18  Height: 162.6 cm (5' 4\")  Weight: 59.9 kg (132 lb)  SpO2: 99 %  Lying Orthostatic BP: 121/84  Lying " Orthostatic Pulse: 97 bpm  Sitting Orthostatic BP: 115/90(stated she was a little lightheaded upon sitting)  Sitting Orthostatic Pulse: 98 bpm  Standing Orthostatic BP: 117/78(still a little lightheaded)  Standing Orthostatic Pulse: 93 bpm      Physical Exam  Vitals signs reviewed.   Constitutional:       General: She is not in acute distress.     Appearance: Normal appearance. She is not ill-appearing, toxic-appearing or diaphoretic.   HENT:      Head: Normocephalic and atraumatic.      Right Ear: External ear normal.      Left Ear: External ear normal.      Nose: Nose normal.      Mouth/Throat:      Pharynx: No posterior oropharyngeal erythema.   Eyes:      Extraocular Movements: Extraocular movements intact.      Conjunctiva/sclera: Conjunctivae normal.   Neck:      Musculoskeletal: Normal range of motion and neck supple. No neck rigidity or muscular tenderness.   Cardiovascular:      Rate and Rhythm: Normal rate. Rhythm irregular.      Pulses: Normal pulses.           Radial pulses are 2+ on the right side and 2+ on the left side.        Dorsalis pedis pulses are 2+ on the right side and 2+ on the left side.      Heart sounds: No murmur. No friction rub.   Pulmonary:      Effort: Pulmonary effort is normal.      Breath sounds: Normal breath sounds and air entry. No wheezing or rales.   Chest:      Chest wall: No tenderness.   Abdominal:      General: Bowel sounds are normal. There is no distension.      Palpations: Abdomen is soft.   Musculoskeletal: Normal range of motion.         General: No swelling.      Right lower leg: No edema.      Left lower leg: No edema.   Skin:     General: Skin is warm and dry.          Neurological:      General: No focal deficit present.      Mental Status: She is alert and oriented to person, place, and time.      GCS: GCS eye subscore is 4. GCS verbal subscore is 5. GCS motor subscore is 6.      Cranial Nerves: Cranial nerves are intact. No cranial nerve deficit, dysarthria or  "facial asymmetry.      Sensory: Sensation is intact. No sensory deficit.      Motor: Motor function is intact. No weakness, abnormal muscle tone or pronator drift.      Coordination: Coordination is intact. Coordination normal. Finger-Nose-Finger Test normal.      Gait: Gait is intact. Gait normal.      Deep Tendon Reflexes:      Reflex Scores:       Bicep reflexes are 2+ on the right side and 2+ on the left side.       Patellar reflexes are 2+ on the right side and 2+ on the left side.     Comments: No nystagmus.  No vertigo with positional changes.   Psychiatric:         Mood and Affect: Mood normal.         Behavior: Behavior normal.         ED Course                EKG Interpretation:      Interpreted by Raghavendra Flores MD  Time reviewed: 21:46  Symptoms at time of EKG: Lightedheadness  Rhythm: normal sinus w/ occasional PVC  Rate: normal  Axis: normal  Conduction: normal  ST Segments/ T Waves: No ST-T wave changes  Q Waves: none  Comparison to prior: PVCs no present vs 9/29/20    Clinical Impression: nonischemic EKG with occasional PVC        Results for orders placed or performed during the hospital encounter of 10/01/20 (from the past 24 hour(s))   XR Chest 2 Views    Narrative    PROCEDURE INFORMATION:   Exam: XR Chest, 2 Views   Exam date and time: 10/1/2020 9:18 PM   Age: 60 years old   Clinical indication: Shortness of breath; Patient HX: PT states she has been   lightheaded for the past couple days, but tonight it has lasted approx. 2   hours. PT also reports nausea. PT states she \"has a leaky valve\". PT reports   \"throwing pvcs\" per her provider she needs to come in with these issues. PT   reports SOB, but does have copd. ; Additional info: SOB, dizziness w/   substernal pressure     TECHNIQUE:   Imaging protocol: XR of the chest   Views: 2 views.     COMPARISON:   COMPARISON MORE: CR - XR CHEST 2 VW 10/7/2019 3:00:28 PM    FINDINGS:   Lungs: Hyperaerated. No consolidation.   Pleural space: No " significant pleural effusion. No pneumothorax.   Heart/Mediastinum: Unremarkable.   Bones/joints: Unchanged.       Impression    IMPRESSION:   No evidence of acute pathology.     THIS DOCUMENT HAS BEEN ELECTRONICALLY SIGNED BY CORA MALDONADO MD   CBC with platelets differential   Result Value Ref Range    WBC 6.1 4.0 - 11.0 10e9/L    RBC Count 4.00 3.8 - 5.2 10e12/L    Hemoglobin 11.2 (L) 11.7 - 15.7 g/dL    Hematocrit 34.7 (L) 35.0 - 47.0 %    MCV 87 78 - 100 fl    MCH 28.0 26.5 - 33.0 pg    MCHC 32.3 31.5 - 36.5 g/dL    RDW 15.9 (H) 10.0 - 15.0 %    Platelet Count 238 150 - 450 10e9/L    Diff Method Automated Method     % Neutrophils 51.9 %    % Lymphocytes 33.3 %    % Monocytes 7.7 %    % Eosinophils 6.1 %    % Basophils 0.8 %    % Immature Granulocytes 0.2 %    Absolute Neutrophil 3.2 1.6 - 8.3 10e9/L    Absolute Lymphocytes 2.0 0.8 - 5.3 10e9/L    Absolute Monocytes 0.5 0.0 - 1.3 10e9/L    Absolute Eosinophils 0.4 0.0 - 0.7 10e9/L    Absolute Basophils 0.1 0.0 - 0.2 10e9/L    Abs Immature Granulocytes 0.0 0 - 0.4 10e9/L   Basic metabolic panel   Result Value Ref Range    Sodium 140 134 - 144 mmol/L    Potassium 4.5 3.5 - 5.1 mmol/L    Chloride 104 98 - 107 mmol/L    Carbon Dioxide 28 21 - 31 mmol/L    Anion Gap 8 3 - 14 mmol/L    Glucose 90 70 - 105 mg/dL    Urea Nitrogen 14 7 - 25 mg/dL    Creatinine 0.88 0.60 - 1.20 mg/dL    GFR Estimate 66 >60 mL/min/[1.73_m2]    GFR Estimate If Black 80 >60 mL/min/[1.73_m2]    Calcium 8.8 8.6 - 10.3 mg/dL   Troponin [facility]   Result Value Ref Range    Troponin 4.5 <34.0 pg/mL       Medications   ondansetron (ZOFRAN) injection 4 mg (4 mg Intravenous Given 10/1/20 2119)       Assessments & Plan (with Medical Decision Making)     I have reviewed the nursing notes.    Patient presenting with worsening dizziness.  Differential included stroke, ACS, electrolyte abnormality, significant anemia, arrhythmia, infection, BPPV.  Vitals including orthostatics unremarkable.  Normal exam  including neurologic with no nystagmus or positional vertigo.  EKG nonischemic with occasional PVC.  Labs including troponin unremarkable except for mild anemia 11.2.  CXR with no acute process.  TSH, BNP, anemic biomarkers recently tested and overall are unremarkable. Recent 9/29/20 TTE with moderate MR and patient follows with cardiology. Patient ambulated without any dizziness.  Unclear etiology for dizziness.  Return instructions provided for worsening symptoms. Patient will be following up with her doctor tomorrow 10/2.      I have reviewed the findings, diagnosis, plan and need for follow up with the patient.    Discharge Medication List as of 10/1/2020 11:28 PM          Final diagnoses:   Dizziness       10/1/2020   [facility]     Raghavendra Flores MD  10/02/20 0528       Raghavendra Flores MD  10/02/20 1926

## 2020-10-03 ASSESSMENT — ANXIETY QUESTIONNAIRES: GAD7 TOTAL SCORE: 0

## 2020-10-04 LAB — B BURGDOR IGG+IGM SER QL: 0.04 (ref 0–0.89)

## 2020-10-05 LAB
ANA SER QL IF: NEGATIVE
HCV AB SERPL QL IA: NONREACTIVE

## 2020-10-06 LAB
B19V IGG SER IA-ACNC: 5.46 IV
B19V IGM SER IA-ACNC: 0.12 IV

## 2020-10-07 ENCOUNTER — MYC MEDICAL ADVICE (OUTPATIENT)
Dept: FAMILY MEDICINE | Facility: OTHER | Age: 60
End: 2020-10-07

## 2020-10-07 DIAGNOSIS — B34.3 PARVOVIRUS ARTHRITIS OF MULTIPLE SITES (H): Primary | ICD-10-CM

## 2020-10-07 DIAGNOSIS — M01.X9 PARVOVIRUS ARTHRITIS OF MULTIPLE SITES (H): Primary | ICD-10-CM

## 2020-10-08 RX ORDER — PREDNISONE 20 MG/1
TABLET ORAL
Qty: 15 TABLET | Refills: 0 | Status: SHIPPED | OUTPATIENT
Start: 2020-10-08 | End: 2020-10-18

## 2020-10-26 ENCOUNTER — MYC MEDICAL ADVICE (OUTPATIENT)
Dept: FAMILY MEDICINE | Facility: OTHER | Age: 60
End: 2020-10-26

## 2020-10-26 DIAGNOSIS — B34.3 PARVOVIRUS ARTHRITIS OF MULTIPLE SITES (H): Primary | ICD-10-CM

## 2020-10-26 DIAGNOSIS — M01.X9 PARVOVIRUS ARTHRITIS OF MULTIPLE SITES (H): Primary | ICD-10-CM

## 2020-10-26 DIAGNOSIS — M79.10 MYALGIA: ICD-10-CM

## 2020-10-27 RX ORDER — MELOXICAM 15 MG/1
15 TABLET ORAL DAILY
Qty: 30 TABLET | Refills: 0 | Status: SHIPPED | OUTPATIENT
Start: 2020-10-27 | End: 2020-11-02

## 2020-11-02 ENCOUNTER — OFFICE VISIT (OUTPATIENT)
Dept: FAMILY MEDICINE | Facility: OTHER | Age: 60
End: 2020-11-02
Attending: FAMILY MEDICINE
Payer: COMMERCIAL

## 2020-11-02 VITALS
TEMPERATURE: 97.8 F | SYSTOLIC BLOOD PRESSURE: 102 MMHG | OXYGEN SATURATION: 97 % | RESPIRATION RATE: 18 BRPM | DIASTOLIC BLOOD PRESSURE: 70 MMHG | HEART RATE: 86 BPM | BODY MASS INDEX: 22.49 KG/M2 | WEIGHT: 131 LBS

## 2020-11-02 DIAGNOSIS — M01.X9 PARVOVIRUS ARTHRITIS OF MULTIPLE SITES (H): Primary | ICD-10-CM

## 2020-11-02 DIAGNOSIS — B34.3 PARVOVIRUS ARTHRITIS OF MULTIPLE SITES (H): Primary | ICD-10-CM

## 2020-11-02 DIAGNOSIS — F33.41 RECURRENT MAJOR DEPRESSION IN PARTIAL REMISSION (H): ICD-10-CM

## 2020-11-02 DIAGNOSIS — F51.01 PRIMARY INSOMNIA: ICD-10-CM

## 2020-11-02 PROCEDURE — 99213 OFFICE O/P EST LOW 20 MIN: CPT | Performed by: FAMILY MEDICINE

## 2020-11-02 RX ORDER — MELOXICAM 15 MG/1
15 TABLET ORAL DAILY
Qty: 30 TABLET | Refills: 0 | Status: SHIPPED | OUTPATIENT
Start: 2020-11-02 | End: 2020-12-10

## 2020-11-02 RX ORDER — VENLAFAXINE HYDROCHLORIDE 150 MG/1
300 CAPSULE, EXTENDED RELEASE ORAL DAILY
Qty: 180 CAPSULE | Refills: 3 | Status: SHIPPED | OUTPATIENT
Start: 2020-11-02 | End: 2022-01-25

## 2020-11-02 RX ORDER — TRAZODONE HYDROCHLORIDE 150 MG/1
150 TABLET ORAL AT BEDTIME
Qty: 90 TABLET | Refills: 3 | Status: SHIPPED | OUTPATIENT
Start: 2020-11-02 | End: 2022-01-11

## 2020-11-02 ASSESSMENT — PAIN SCALES - GENERAL: PAINLEVEL: MODERATE PAIN (4)

## 2020-11-02 NOTE — PROGRESS NOTES
Nursing Notes:   Estefanía Sahni LPN  11/2/2020  2:38 PM  Signed  Pt presents to clinic today for back pain and medication refills.      Medication Reconciliation: complete  Estefanía Sahni LPN        SUBJECTIVE:  60 year old female presents to follow up on diffuse myalgias and athralgias  Her parvovirus IgG was positive. IgM negative. Other polyathralgia labs were negative  Improved on prednisone  Now on meloxicam with improvement in pain  Both feet and ankles hurt when she gets out of bed, but only 5 minutes  She had some heel pain for a day  Otherwise more of a diffuse aching and pain in arms, thighs, feet and ankles.  Dealing with pain between scapulae for 2 weeks. No clear injury.  No numbness or tingling to the arms or legs.    Requesting refills of trazodone and venlafaxine.  She is on stable doses for years.      REVIEW OF SYSTEMS:    Pertinent items are noted in HPI.    Current Outpatient Medications   Medication Sig Dispense Refill     albuterol (2.5 MG/3ML) 0.083% neb solution Take 1 vial (2.5 mg) by nebulization 4 times daily 360 mL 3     albuterol (PROAIR HFA) 108 (90 Base) MCG/ACT inhaler Inhale 2 puffs into the lungs 4 times daily as needed 3 Inhaler 0     alendronate (FOSAMAX) 70 MG tablet Take 1 tablet (70 mg) by mouth every 7 days Take in the morning on empty stomach with full glass of water. Do not lie down for 1 hr. 13 tablet 3     azelastine (ASTEPRO) 0.15 % nasal spray Spray 2 sprays into both nostrils daily 30 mL 11     diclofenac (VOLTAREN) 1 % topical gel Apply 2 g topically 4 times daily 150 g 1     ferrous sulfate (FEROSUL) 325 (65 Fe) MG tablet Take 325 mg by mouth       fluticasone (FLONASE) 50 MCG/ACT nasal spray Spray 1 spray into both nostrils daily 18 g 11     gabapentin (NEURONTIN) 400 MG capsule Take 1 capsule (400 mg) by mouth daily AND 2 capsules (800 mg) At Bedtime. 270 capsule 3     hydrOXYzine (ATARAX) 10 MG tablet Take 1-2 tablets (10-20 mg) by mouth 3 times daily as needed  60 tablet 3     lisinopril (PRINIVIL/ZESTRIL) 5 MG tablet Take 1 tablet (5 mg) by mouth daily 90 tablet 4     meloxicam (MOBIC) 15 MG tablet Take 1 tablet (15 mg) by mouth daily 30 tablet 0     omeprazole (PRILOSEC) 20 MG DR capsule Take 1 capsule (20 mg) by mouth daily 90 capsule 0     sodium chloride 3 % NEBU neb solution INHALE 3ML AS DIRECTED       traZODone (DESYREL) 150 MG tablet Take 1 tablet (150 mg) by mouth At Bedtime 90 tablet 3     venlafaxine (EFFEXOR-XR) 150 MG 24 hr capsule Take 2 capsules (300 mg) by mouth daily 180 capsule 3     No Known Allergies    OBJECTIVE:  /70   Pulse 86   Temp 97.8  F (36.6  C) (Temporal)   Resp 18   Wt 59.4 kg (131 lb)   SpO2 97%   BMI 22.49 kg/m      EXAM:  General Appearance: Alert. No acute distress  Psychiatric: Normal affect and mentation  Musculoskeletal: Tender to thoracic paraspinal muscles and mid thoracic region between scapulae.  No worsening with rotation.  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 Primary insomnia  F51.01 traZODone (DESYREL) 150 MG tablet   3. Recurrent major depression in partial remission (H)  F33.41 venlafaxine (EFFEXOR-XR) 150 MG 24 hr capsule       Testing has only shown positive IgG for parvovirus.  She improved with a short course of prednisone and has noticed improvement with meloxicam.  Has an appointment in early December with rheumatology in [place].  Continue meloxicam until that time.    Reassured her about back pain, does not seem to relate to other joint problems.  Could try physical therapy.  She would prefer to work on some stretches at home.    Refilled trazodone and venlafaxine  Follow-up after rheumatology    Silvino Peace MD    This document was prepared using a combination of typing and voice generated software.  While every attempt was made for  accuracy, spelling and grammatical errors may exist.

## 2020-11-02 NOTE — NURSING NOTE
Pt presents to clinic today for back pain and medication refills.      Medication Reconciliation: complete  Estefanía Sahni LPN

## 2020-11-12 ENCOUNTER — OFFICE VISIT (OUTPATIENT)
Dept: CARDIOLOGY | Facility: OTHER | Age: 60
End: 2020-11-12
Attending: INTERNAL MEDICINE
Payer: COMMERCIAL

## 2020-11-12 VITALS
SYSTOLIC BLOOD PRESSURE: 110 MMHG | HEIGHT: 64 IN | BODY MASS INDEX: 22.53 KG/M2 | TEMPERATURE: 96.9 F | OXYGEN SATURATION: 99 % | RESPIRATION RATE: 18 BRPM | DIASTOLIC BLOOD PRESSURE: 74 MMHG | WEIGHT: 132 LBS | HEART RATE: 67 BPM

## 2020-11-12 DIAGNOSIS — I51.7 BILATERAL ENLARGEMENT OF ATRIA: ICD-10-CM

## 2020-11-12 DIAGNOSIS — I49.3 PVC'S (PREMATURE VENTRICULAR CONTRACTIONS): ICD-10-CM

## 2020-11-12 DIAGNOSIS — Z11.59 ENCOUNTER FOR SCREENING FOR OTHER VIRAL DISEASES: Primary | ICD-10-CM

## 2020-11-12 DIAGNOSIS — Z71.6 TOBACCO ABUSE COUNSELING: ICD-10-CM

## 2020-11-12 DIAGNOSIS — E78.2 MIXED HYPERLIPIDEMIA: ICD-10-CM

## 2020-11-12 DIAGNOSIS — E87.6 HYPOKALEMIA: ICD-10-CM

## 2020-11-12 DIAGNOSIS — I10 ESSENTIAL HYPERTENSION: ICD-10-CM

## 2020-11-12 DIAGNOSIS — I34.0 MODERATE MITRAL REGURGITATION: Primary | ICD-10-CM

## 2020-11-12 DIAGNOSIS — Z72.0 TOBACCO ABUSE: ICD-10-CM

## 2020-11-12 DIAGNOSIS — I49.9 IRREGULAR HEART RATE: ICD-10-CM

## 2020-11-12 PROCEDURE — 99214 OFFICE O/P EST MOD 30 MIN: CPT | Performed by: INTERNAL MEDICINE

## 2020-11-12 ASSESSMENT — MIFFLIN-ST. JEOR: SCORE: 1155.26

## 2020-11-12 ASSESSMENT — PAIN SCALES - GENERAL: PAINLEVEL: NO PAIN (0)

## 2020-11-12 NOTE — PROGRESS NOTES
[facility] HEART CARE   CARDIOLOGY PROGRESS NOTE     Chief Complaint   Patient presents with     Follow Up     6 week          Diagnosis:  1.  Moderate mitral regurgitation on his echocardiogram from 9/14/20.  2.  PVCs at 6.4% on a Zio patch on 9/30/2020.  3.  Hypokalemia-resolved.  4.  COPD-moderate on 9/25/2017.  5.  Lung nodule.  6.  Hypertension-controlled.  7.  Hyperlipidemia-controlled.  8.  Tobacco abuse with counseling.  9.  Bilateral atrial enlargement-severe.  10.  Irregular heart rate.      Assessment/Plan:    1.  Related to an EKG showing multiple PVCs on 8/27/2020, multiple months of an irregular heart rate suspicious for PVCs, obtained a ZIO Patch.  ZIO Patch showing PVCs at 6.4% burden.  Patient has symptoms x2-3 times a week, lasting approximately 5 minutes.  Patient relieved.  Patient declined medications today.  She understands in the future, if she has symptoms, these can be treated with medications.  No A. fib seen.  2.  Related to her echocardiogram that shows moderate mitral insufficiency, will obtain a GUSTAVO.  GUSTAVO previously ordered on 9/20/2020.  Never scheduled.  We will try to reschedule today..  3.  Reviewed her echo from 9/14/2020 that shows moderate mitral regurgitation with severe biatrial enlargement.  EF low normal at 50-55%.  6.  Follow-up after completion of GUSTAVO to assess severity of mitral regurgitation.    Interval history:  Overall, she is doing well.  We reviewed her Zio patch.  She reports having palpitations 2-3 times a week lasting 5 minutes.  She has been increasingly fatigued with joint pain and muscle aches.  She plans to see rheumatology with rheumatology concerns on 12/2/2020 in [place].  She was relieved that she did not have anything serious on her Zio patch.  Being that her PVC burden is less than 20%, it would not require treatment.  However, I am still concerned with her mitral insufficiency.  As mentioned above, she is due for a GUSTAVO.  GUSTAVO ordered and plan to be obtained  at [facility].  For some reason reason, this test was never completed.  We will schedule this test today and see her in follow-up.      HPI:    For the last few months, she has noted an irregular pulse. She has been increasingly fatigued.  She was seen in the clinic on 8/27/2024 for an intentional overdose on 7/6/2020 with trazodone.  She was taking iron for a mildly low hemoglobin.  Patient reports significant fatigue.  No chest pain but has shortness of breath with presumptive COPD with a long history of tobacco abuse starting at age 13. She has smoked up to 1.5 packs/day currently at a pack a day.  She has a quit date of October 15, 2020.  Lyme disease ordered with concerns for tick bite.  No history of autoimmune disease but describes pain in both arms and across the chest that is tender.  Patient does not believe she has sleep apnea denying snoring or stopping breathing at night.  Her  told her that she occasionally snores but not significant.  As part of the work-up, a stress echo was ordered to assess for heart disease as causing her symptoms.     She was to have a stress echo.  As part of pulmonary images, she was found to have moderate mitral insufficiency with a low normal EF of 50-55%.  Noted frequent PVCs as well.  There was no wall motion normality at rest.  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  Based on frequent PVCs as noted on EKG as well as a stress test and her moderate mitral insufficiency, she was referred to cardiology.     Of note, she was found to have a potassium of 2.6 on 7/6/2020.  There is also history of alcohol abuse and suicidal intentions.  She was noted to have lung nodule previously.      Relevant testing:  Zio patch on 9/30/20:  The patient was monitored for 9 days, 16 hours.  Minimum HR was 55, average HR was 85, maximum HR was 207.  There were 10 triggered events.  During the patient triggered events, the rhythm was sinus with ectopy.  There were rare  supraventricular ectopic beats, longest consisted of 13 beats.  There were frequent (6.4%) ventricular ectopic beats, longest consisted of 20 beats.  Atrial fibrillation is present     Impression:  Cardiac event monitor revealing paroxysmal supraventricular tachycardia, paroxysmal ventricular tachycardia, paroxysmal atrial fibrillation.    ECHO on 20:  Stress test not performed due to underlying MR and PVCs.   Borderline (EF 50-55%) reduced left ventricular function is present. Moderate   LV dilation. No regional wall motion abnormalities are seen.   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   IVC diameter <2.1 cm collapsing >50% with sniff suggests a normal RA pressure   of 3 mmHg.   No pericardial effusion is present.   Mild dilatation of the aorta is present.   There is no prior study for direct comparison.       Past Medical History:   Diagnosis Date     Age-related osteoporosis with current pathological fracture with routine healing     2017     Anxiety disorder     No Comments Provided     Appendicitis     No Comments Provided     Benign neoplasm of connective and other soft tissue, unspecified     Right Breast     Bilateral carpal tunnel syndrome      Brachial plexus disorders     2016     Chronic obstructive pulmonary disease (H)     No Comments Provided     Essential (primary) hypertension     No Comments Provided     Major depressive disorder, single episode     Depression with insomnia and tiredness     Other displaced fracture of upper end of left humerus, subsequent encounter for fracture with routine healing     2016     Other fracture of unspecified lower leg, initial encounter for closed fracture     Fracture, right ankle     Personal history of other medical treatment (CODE)      4, Para 4-0-0-4     Pure hypercholesterolemia     No Comments Provided     Rectal polyp     Hyperplastic polyp times three noted on colonoscopy April  2007     Tobacco use     No Comments Provided     Uncomplicated alcohol dependence (H)     No Comments Provided       Past Surgical History:   Procedure Laterality Date     BRONCHOSCOPY DIAGNOSTIC  08/07/2018    MAC infection     CLOSED REDUCTION ANKLE      12/26/07,Open reduction/internal fixation, right ankle fracture 12/26/07.     COLONOSCOPY      2007,screening for family history,  follow-up recommended in five years.     COLONOSCOPY      2007,Colonoscopy April 2007.  Follow-up recommended in five years.     COLONOSCOPY  02/18/2013 2/2013,Normal, hemorrhoids - follow up 5 years for family history of colon cnacer     HYSTERECTOMY TOTAL ABDOMINAL      04/26/2007,Laparoscopic supracervical hysterectomy and bilateral salpingo-oophorectomy for bleeding fibroid     LAPAROSCOPIC BYPASS GASTRIC      2007     LAPAROSCOPIC TUBAL LIGATION      1985     LOBECTOMY LUNG  08/07/2018    VATS - LOBECTOMY UPPER LUNG, Dr [address]     LUMPECTOMY BREAST      Lumpectomy of the right breast     RELEASE CARPAL TUNNEL Left 2/21/2019    Procedure: RELEASE CARPAL TUNNEL;  Surgeon: Andrew Sahni MD;  Location: [facility] OR     RELEASE CARPAL TUNNEL Right 4/4/2019    Procedure: Open Carpal Tunnel Release;  Surgeon: Andrew Sahni MD;  Location: [facility] OR     SKIN GRAFT, EACH ADDN 100SQCM  03/08/2007    Skin grafting post right ankle fracture       No Known Allergies    Current Outpatient Medications   Medication Sig Dispense Refill     albuterol (2.5 MG/3ML) 0.083% neb solution Take 1 vial (2.5 mg) by nebulization 4 times daily 360 mL 3     albuterol (PROAIR HFA) 108 (90 Base) MCG/ACT inhaler Inhale 2 puffs into the lungs 4 times daily as needed 3 Inhaler 0     alendronate (FOSAMAX) 70 MG tablet Take 1 tablet (70 mg) by mouth every 7 days Take in the morning on empty stomach with full glass of water. Do not lie down for 1 hr. 13 tablet 3     azelastine (ASTEPRO) 0.15 % nasal spray Spray 2 sprays into both nostrils daily 30 mL 11      diclofenac (VOLTAREN) 1 % topical gel Apply 2 g topically 4 times daily 150 g 1     ferrous sulfate (FEROSUL) 325 (65 Fe) MG tablet Take 325 mg by mouth       fluticasone (FLONASE) 50 MCG/ACT nasal spray Spray 1 spray into both nostrils daily 18 g 11     gabapentin (NEURONTIN) 400 MG capsule Take 1 capsule (400 mg) by mouth daily AND 2 capsules (800 mg) At Bedtime. 270 capsule 3     hydrOXYzine (ATARAX) 10 MG tablet Take 1-2 tablets (10-20 mg) by mouth 3 times daily as needed 60 tablet 3     lisinopril (PRINIVIL/ZESTRIL) 5 MG tablet Take 1 tablet (5 mg) by mouth daily 90 tablet 4     meloxicam (MOBIC) 15 MG tablet Take 1 tablet (15 mg) by mouth daily 30 tablet 0     omeprazole (PRILOSEC) 20 MG DR capsule Take 1 capsule (20 mg) by mouth daily 90 capsule 0     sodium chloride 3 % NEBU neb solution INHALE 3ML AS DIRECTED       traZODone (DESYREL) 150 MG tablet Take 1 tablet (150 mg) by mouth At Bedtime 90 tablet 3     venlafaxine (EFFEXOR-XR) 150 MG 24 hr capsule Take 2 capsules (300 mg) by mouth daily 180 capsule 3       Social History     Socioeconomic History     Marital status:      Spouse name: noris     Number of children: Not on file     Years of education: 11 GED     Highest education level: Not on file   Occupational History     Not on file   Social Needs     Financial resource strain: Not on file     Food insecurity     Worry: Not on file     Inability: Not on file     Transportation needs     Medical: Not on file     Non-medical: Not on file   Tobacco Use     Smoking status: Light Tobacco Smoker     Packs/day: 0.00     Years: 43.00     Pack years: 0.00     Types: Cigarettes     Smokeless tobacco: Never Used     Tobacco comment: 5 per day   Substance and Sexual Activity     Alcohol use: Not Currently     Alcohol/week: 0.0 standard drinks     Drug use: No     Comment: Drug use: No     Sexual activity: Yes     Partners: Male     Birth control/protection: Surgical   Lifestyle     Physical activity      Days per week: Not on file     Minutes per session: Not on file     Stress: Not on file   Relationships     Social connections     Talks on phone: Not on file     Gets together: Not on file     Attends [employer] service: Not on file     Active member of club or organization: Not on file     Attends meetings of clubs or organizations: Not on file     Relationship status: Not on file     Intimate partner violence     Fear of current or ex partner: Not on file     Emotionally abused: Not on file     Physically abused: Not on file     Forced sexual activity: Not on file   Other Topics Concern     Parent/sibling w/ CABG, MI or angioplasty before 65F 55M? Not Asked   Social History Narrative    . Drinks 6 beers daily.       LAB RESULTS:   Office Visit on 10/02/2020   Component Date Value Ref Range Status     TERRANCE interpretation 10/02/2020 Negative  NEG^Negative Final     Iron 10/02/2020 51  50 - 212 ug/dL Final     UIBC (Unsaturated) 10/02/2020 401.20  mg/dL Final     Iron Binding Capacity 10/02/2020 452.20* 245.00 - 400.00 ug/dL Final     Iron Saturation 10/02/2020 11* 20 - 55 % Final     Vitamin D Total 10/02/2020 29.4  ng/mL Final     Hepatitis C Antibody 10/02/2020 Nonreactive  NR^Nonreactive Final     CK Total 10/02/2020 79  30 - 223 U/L Final     Parvovirus B19 IgG 10/02/2020 5.46* <=0.90 IV Final     Parvovirus B19 IgM 10/02/2020 0.12  <=0.90 IV Final     Lyme Disease Antibodies Serum 10/02/2020 0.04  0.00 - 0.89 Final     Sed Rate 10/02/2020 14  1 - 15 mm/h Final     CRP Inflammation 10/02/2020 1.3  <10.0 mg/L Final   Office Visit on 09/29/2020   Component Date Value Ref Range Status     Interpretation ECG 09/29/2020 Click View Image link to view waveform and result   Final     Sodium 09/29/2020 140  134 - 144 mmol/L Final     Potassium 09/29/2020 4.4  3.5 - 5.1 mmol/L Final     Chloride 09/29/2020 104  98 - 107 mmol/L Final     Carbon Dioxide 09/29/2020 30  21 - 31 mmol/L Final     Anion Gap 09/29/2020 6  3  "- 14 mmol/L Final     Glucose 09/29/2020 81  70 - 105 mg/dL Final     Urea Nitrogen 09/29/2020 14  7 - 25 mg/dL Final     Creatinine 09/29/2020 1.02  0.60 - 1.20 mg/dL Final     GFR Estimate 09/29/2020 55* >60 mL/min/[1.73_m2] Final     GFR Estimate If Black 09/29/2020 67  >60 mL/min/[1.73_m2] Final     Calcium 09/29/2020 9.5  8.6 - 10.3 mg/dL Final     Bilirubin Total 09/29/2020 0.4  0.3 - 1.0 mg/dL Final     Albumin 09/29/2020 4.1  3.5 - 5.7 g/dL Final     Protein Total 09/29/2020 6.9  6.4 - 8.9 g/dL Final     Alkaline Phosphatase 09/29/2020 67  34 - 104 U/L Final     ALT 09/29/2020 21  7 - 52 U/L Final     AST 09/29/2020 26  13 - 39 U/L Final     Magnesium 09/29/2020 1.9  1.9 - 2.7 mg/dL Final     Folate 09/29/2020 7.6  >5.21 ng/mL Final     Vitamin B12 09/29/2020 302  180 - 914 pg/mL Final     N-Terminal Pro Bnp 09/29/2020 132* 0 - 100 pg/mL Final        Review of systems: Negative except that which was noted in the HPI.    Physical examination:  /74 (BP Location: Right arm, Patient Position: Sitting, Cuff Size: Adult Regular)   Pulse 67   Temp 96.9  F (36.1  C) (Tympanic)   Resp 18   Ht 1.62 m (5' 3.78\")   Wt 59.9 kg (132 lb)   SpO2 99%   BMI 22.81 kg/m      GENERAL APPEARANCE: healthy, alert and no distress  HEENT: no icterus, no xanthelasmas, normal pupil size and reaction, no cyanosis.  NECK: no adenopathy, no asymmetry, masses, or scars, no cervical bruits, JVP is not visible  CHEST: lungs clear to auscultation - no rales, rhonchi or wheezes, no use of accessory muscles, no retractions, respirations are unlabored, normal respiratory rate  CARDIOVASCULAR: regular rhythm, normal S1 with physiologic split S2, no S3 or S4 with (+) murmur, click or rub  ABDOMEN: soft, non tender, bowel sounds normal  EXTREMITIES: no clubbing, cyanosis with mild peripheral edema  NEURO: alert and oriented normal speech, and affect  VASC: No vascular bruits heard.  SKIN: no ecchymoses, no rashes      Thank you for " allowing me to participate in the care of your patient. Please do not hesitate to contact me if you have any questions.     Jose Guadalupe Lechuga, DO

## 2020-11-12 NOTE — PATIENT INSTRUCTIONS
You were seen by  Jose Guadalupe Lechuga, DO       1. A GUSTAVO transesophageal echocardiogram has been ordered. You will be called to schedule this. You will receive instructions for testing at that time. You will be contacted with results.       2. Follow up when GUSTAVO is completed.      You will follow up with [facility]  when GUSTAVO is completed, sooner if needed.       Please call the cardiology office with problems, questions, or concerns at [phone].    If you experience chest pain, chest pressure, chest tightness, shortness of breath, fainting, lightheadedness, nausea, vomiting, or other concerning symptoms, please report to the Emergency Department or call 911. These symptoms may be emergent, and best treated in the Emergency Department.     Cardiology Nurses  MAJO Carrion, MARIAM ACOSTA LPN  [facility] (Unit [address])  [phone]

## 2020-11-12 NOTE — NURSING NOTE
"Patient comes in for 6 week follow up.  Jaycee Zarate LPN ....................11/12/2020   10:49 AM  Chief Complaint   Patient presents with     Follow Up     6 week       Initial /74 (BP Location: Right arm, Patient Position: Sitting, Cuff Size: Adult Regular)   Pulse 67   Temp 96.9  F (36.1  C) (Tympanic)   Resp 18   Ht 1.62 m (5' 3.78\")   Wt 59.9 kg (132 lb)   SpO2 99%   BMI 22.81 kg/m   Estimated body mass index is 22.81 kg/m  as calculated from the following:    Height as of this encounter: 1.62 m (5' 3.78\").    Weight as of this encounter: 59.9 kg (132 lb).  Meds Reconciled: complete  Pt is not on Aspirin  Pt is not on a Statin  PHQ and/or NOA reviewed. Pt referred to PCP/MH Provider as appropriate.    Jaycee Zarate LPN      "

## 2020-11-19 ENCOUNTER — ALLIED HEALTH/NURSE VISIT (OUTPATIENT)
Dept: FAMILY MEDICINE | Facility: OTHER | Age: 60
End: 2020-11-19
Attending: INTERNAL MEDICINE
Payer: COMMERCIAL

## 2020-11-19 DIAGNOSIS — Z11.59 ENCOUNTER FOR SCREENING FOR OTHER VIRAL DISEASES: ICD-10-CM

## 2020-11-19 DIAGNOSIS — Z20.822 COVID-19 RULED OUT: Primary | ICD-10-CM

## 2020-11-19 PROCEDURE — C9803 HOPD COVID-19 SPEC COLLECT: HCPCS

## 2020-11-19 PROCEDURE — U0003 INFECTIOUS AGENT DETECTION BY NUCLEIC ACID (DNA OR RNA); SEVERE ACUTE RESPIRATORY SYNDROME CORONAVIRUS 2 (SARS-COV-2) (CORONAVIRUS DISEASE [COVID-19]), AMPLIFIED PROBE TECHNIQUE, MAKING USE OF HIGH THROUGHPUT TECHNOLOGIES AS DESCRIBED BY CMS-2020-01-R: HCPCS | Mod: ZL | Performed by: INTERNAL MEDICINE

## 2020-11-19 PROCEDURE — 99207 PR NO CHARGE NURSE ONLY: CPT

## 2020-11-19 NOTE — PROGRESS NOTES
Patient swabbed for COVID-19 testing.  Le Caballero LPN on 11/19/2020 at 2:37 PM    Procedure at  of  on 11/24/20.

## 2020-11-24 ENCOUNTER — HOSPITAL ENCOUNTER (OUTPATIENT)
Dept: CARDIOLOGY | Facility: CLINIC | Age: 60
Discharge: HOME | End: 2020-11-24
Attending: INTERNAL MEDICINE | Admitting: INTERNAL MEDICINE
Payer: COMMERCIAL

## 2020-11-24 VITALS
RESPIRATION RATE: 12 BRPM | HEART RATE: 65 BPM | SYSTOLIC BLOOD PRESSURE: 131 MMHG | DIASTOLIC BLOOD PRESSURE: 76 MMHG | OXYGEN SATURATION: 97 %

## 2020-11-24 DIAGNOSIS — I51.7 BILATERAL ENLARGEMENT OF ATRIA: ICD-10-CM

## 2020-11-24 DIAGNOSIS — I34.0 MODERATE MITRAL REGURGITATION: Primary | ICD-10-CM

## 2020-11-24 DIAGNOSIS — I34.0 MODERATE MITRAL REGURGITATION: ICD-10-CM

## 2020-11-24 LAB
SARS-COV-2 RNA SPEC QL NAA+PROBE: NOT DETECTED
SPECIMEN SOURCE: NORMAL

## 2020-11-24 PROCEDURE — 93312 ECHO TRANSESOPHAGEAL: CPT | Mod: 26 | Performed by: INTERNAL MEDICINE

## 2020-11-24 PROCEDURE — 76376 3D RENDER W/INTRP POSTPROCES: CPT | Mod: 26 | Performed by: INTERNAL MEDICINE

## 2020-11-24 PROCEDURE — 93325 DOPPLER ECHO COLOR FLOW MAPG: CPT | Mod: 26 | Performed by: INTERNAL MEDICINE

## 2020-11-24 PROCEDURE — 250N000009 HC RX 250: Performed by: INTERNAL MEDICINE

## 2020-11-24 PROCEDURE — 93325 DOPPLER ECHO COLOR FLOW MAPG: CPT

## 2020-11-24 PROCEDURE — 93320 DOPPLER ECHO COMPLETE: CPT | Mod: 26 | Performed by: INTERNAL MEDICINE

## 2020-11-24 PROCEDURE — 250N000011 HC RX IP 250 OP 636: Performed by: INTERNAL MEDICINE

## 2020-11-24 PROCEDURE — 76376 3D RENDER W/INTRP POSTPROCES: CPT

## 2020-11-24 RX ORDER — NALOXONE HYDROCHLORIDE 0.4 MG/ML
0.4 INJECTION, SOLUTION INTRAMUSCULAR; INTRAVENOUS; SUBCUTANEOUS
Status: DISCONTINUED | OUTPATIENT
Start: 2020-11-24 | End: 2020-11-25 | Stop reason: HOSPADM

## 2020-11-24 RX ORDER — LIDOCAINE 40 MG/G
CREAM TOPICAL
Status: DISCONTINUED | OUTPATIENT
Start: 2020-11-24 | End: 2020-11-25 | Stop reason: HOSPADM

## 2020-11-24 RX ORDER — FENTANYL CITRATE 50 UG/ML
25 INJECTION, SOLUTION INTRAMUSCULAR; INTRAVENOUS
Status: DISCONTINUED | OUTPATIENT
Start: 2020-11-24 | End: 2020-11-25 | Stop reason: HOSPADM

## 2020-11-24 RX ORDER — ACYCLOVIR 200 MG/1
9.5 CAPSULE ORAL
Status: DISCONTINUED | OUTPATIENT
Start: 2020-11-24 | End: 2020-11-25 | Stop reason: HOSPADM

## 2020-11-24 RX ORDER — SODIUM CHLORIDE 9 MG/ML
INJECTION, SOLUTION INTRAVENOUS CONTINUOUS PRN
Status: DISCONTINUED | OUTPATIENT
Start: 2020-11-24 | End: 2020-11-25 | Stop reason: HOSPADM

## 2020-11-24 RX ORDER — NALOXONE HYDROCHLORIDE 0.4 MG/ML
0.2 INJECTION, SOLUTION INTRAMUSCULAR; INTRAVENOUS; SUBCUTANEOUS
Status: DISCONTINUED | OUTPATIENT
Start: 2020-11-24 | End: 2020-11-25 | Stop reason: HOSPADM

## 2020-11-24 RX ORDER — FENTANYL CITRATE 50 UG/ML
50 INJECTION, SOLUTION INTRAMUSCULAR; INTRAVENOUS ONCE
Status: DISCONTINUED | OUTPATIENT
Start: 2020-11-24 | End: 2020-11-25 | Stop reason: HOSPADM

## 2020-11-24 RX ORDER — FLUMAZENIL 0.1 MG/ML
0.2 INJECTION, SOLUTION INTRAVENOUS
Status: DISCONTINUED | OUTPATIENT
Start: 2020-11-24 | End: 2020-11-25 | Stop reason: HOSPADM

## 2020-11-24 RX ORDER — LIDOCAINE HYDROCHLORIDE 20 MG/ML
15 SOLUTION OROPHARYNGEAL ONCE
Status: COMPLETED | OUTPATIENT
Start: 2020-11-24 | End: 2020-11-24

## 2020-11-24 RX ADMIN — LIDOCAINE HYDROCHLORIDE 30 ML: 20 SOLUTION ORAL; TOPICAL at 13:30

## 2020-11-24 RX ADMIN — MIDAZOLAM 2 MG: 1 INJECTION INTRAMUSCULAR; INTRAVENOUS at 13:40

## 2020-11-24 RX ADMIN — TOPICAL ANESTHETIC 0.5 ML: 200 SPRAY DENTAL; PERIODONTAL at 13:30

## 2020-11-24 RX ADMIN — MIDAZOLAM 1 MG: 1 INJECTION INTRAMUSCULAR; INTRAVENOUS at 13:53

## 2020-11-24 RX ADMIN — FENTANYL CITRATE 50 MCG: 50 INJECTION, SOLUTION INTRAMUSCULAR; INTRAVENOUS at 13:40

## 2020-11-24 NOTE — SEDATION DOCUMENTATION
Pt here for GUSTAVO. Procedure was explained to the pt and the consent was signed. Discharge instructions were reviewed and a copy was given to the pt. Pt was given Fentanyl 50 mcg IV and Versed 3 mg IV for sedation. Pt tolerated the GUSTAVO without any adverse effects. Probe # 58 was used. Pt to remain NPO until 3:30pm. Pt denies any pain or discomfort post procedure. Pt will be discharged with her  to drive home.

## 2020-12-10 ENCOUNTER — OFFICE VISIT (OUTPATIENT)
Dept: CARDIOLOGY | Facility: OTHER | Age: 60
End: 2020-12-10
Attending: INTERNAL MEDICINE
Payer: COMMERCIAL

## 2020-12-10 VITALS
OXYGEN SATURATION: 100 % | RESPIRATION RATE: 18 BRPM | SYSTOLIC BLOOD PRESSURE: 128 MMHG | TEMPERATURE: 98.4 F | DIASTOLIC BLOOD PRESSURE: 80 MMHG | BODY MASS INDEX: 22.02 KG/M2 | WEIGHT: 129 LBS | HEIGHT: 64 IN | HEART RATE: 71 BPM

## 2020-12-10 DIAGNOSIS — I49.3 PVC'S (PREMATURE VENTRICULAR CONTRACTIONS): ICD-10-CM

## 2020-12-10 DIAGNOSIS — E78.2 MIXED HYPERLIPIDEMIA: ICD-10-CM

## 2020-12-10 DIAGNOSIS — I49.9 IRREGULAR HEART RATE: ICD-10-CM

## 2020-12-10 DIAGNOSIS — Z72.0 TOBACCO ABUSE: ICD-10-CM

## 2020-12-10 DIAGNOSIS — I51.7 BILATERAL ENLARGEMENT OF ATRIA: ICD-10-CM

## 2020-12-10 DIAGNOSIS — I34.0 MODERATE MITRAL REGURGITATION: Primary | ICD-10-CM

## 2020-12-10 DIAGNOSIS — Z71.6 TOBACCO ABUSE COUNSELING: ICD-10-CM

## 2020-12-10 DIAGNOSIS — I10 ESSENTIAL HYPERTENSION: ICD-10-CM

## 2020-12-10 DIAGNOSIS — E87.6 HYPOKALEMIA: ICD-10-CM

## 2020-12-10 PROCEDURE — 99214 OFFICE O/P EST MOD 30 MIN: CPT | Performed by: INTERNAL MEDICINE

## 2020-12-10 ASSESSMENT — ANXIETY QUESTIONNAIRES
6. BECOMING EASILY ANNOYED OR IRRITABLE: NOT AT ALL
5. BEING SO RESTLESS THAT IT IS HARD TO SIT STILL: NOT AT ALL
3. WORRYING TOO MUCH ABOUT DIFFERENT THINGS: NOT AT ALL
IF YOU CHECKED OFF ANY PROBLEMS ON THIS QUESTIONNAIRE, HOW DIFFICULT HAVE THESE PROBLEMS MADE IT FOR YOU TO DO YOUR WORK, TAKE CARE OF THINGS AT HOME, OR GET ALONG WITH OTHER PEOPLE: NOT DIFFICULT AT ALL
GAD7 TOTAL SCORE: 0
7. FEELING AFRAID AS IF SOMETHING AWFUL MIGHT HAPPEN: NOT AT ALL
1. FEELING NERVOUS, ANXIOUS, OR ON EDGE: NOT AT ALL
2. NOT BEING ABLE TO STOP OR CONTROL WORRYING: NOT AT ALL

## 2020-12-10 ASSESSMENT — PAIN SCALES - GENERAL: PAINLEVEL: MODERATE PAIN (4)

## 2020-12-10 ASSESSMENT — PATIENT HEALTH QUESTIONNAIRE - PHQ9
5. POOR APPETITE OR OVEREATING: NOT AT ALL
SUM OF ALL RESPONSES TO PHQ QUESTIONS 1-9: 9

## 2020-12-10 ASSESSMENT — MIFFLIN-ST. JEOR: SCORE: 1141.65

## 2020-12-10 NOTE — NURSING NOTE
"Patient comes in for follow up after GUSTAVO.  Jaycee Zarate LPN ....................12/10/2020   3:25 PM  Chief Complaint   Patient presents with     Follow Up     after GUSTAVO       Initial /80 (BP Location: Right arm, Patient Position: Sitting, Cuff Size: Adult Regular)   Pulse 71   Temp 98.4  F (36.9  C) (Tympanic)   Resp 18   Ht 1.62 m (5' 3.78\")   Wt 58.5 kg (129 lb)   SpO2 100%   BMI 22.30 kg/m   Estimated body mass index is 22.3 kg/m  as calculated from the following:    Height as of this encounter: 1.62 m (5' 3.78\").    Weight as of this encounter: 58.5 kg (129 lb).  Meds Reconciled: complete  Pt is not on Aspirin  Pt is not on a Statin  PHQ and/or NOA reviewed. Pt referred to PCP/MH Provider as appropriate.    Jaycee Zarate LPN      "

## 2020-12-10 NOTE — PROGRESS NOTES
[facility] HEART [facility]   CARDIOLOGY PROGRESS NOTE     Chief Complaint   Patient presents with     Follow Up     after GUSTAVO          Diagnosis:  1.  Moderate mitral regurgitation on her echocardiogram from 9/14/20. GUSTAVO with mild/mod MR on 11/24/20.  2.  PVCs at 6.4% on a Zio patch on 9/30/2020.  3.  Hypokalemia-resolved.  4.  COPD-moderate on 9/25/2017.  5.  Lung nodule.  6.  Hypertension-controlled.  7.  Hyperlipidemia-controlled.  8.  Tobacco abuse with counseling.  9.  Bilateral atrial enlargement-severe.  10.  Irregular heart rate.  11.  Suspected fibromyalgia by rheumatology.      Assessment/Plan:    1.  Related to an EKG showing multiple PVCs on 8/27/2020, multiple months of an irregular heart rate suspicious for PVCs, obtained a ZIO Patch.  ZIO Patch showing PVCs at 6.4% burden.  Patient has symptoms x2-3 times a week, lasting approximately 5 minutes.  Patient relieved.  Patient declined medications today.  She understands in the future, if she has symptoms, these can be treated with medications.  No A. fib seen.  2.  Related to her echocardiogram that shows moderate mitral insufficiency, obtained a GUSTAVO. GUSTAVO completed on 11/24/2020 at the [facility]. GUSTAVO shows mild to moderate mitral regurg.  3.  Patient continues to be dizzy, have encouraged her to increase her fluid intake.  She thinks she consumes minimal fluid.  4.  Repeat echo in 6 months related to mild to moderate mitral regurgitation.  5.  Patient would like to be called with the results and is okay not being seen if things are stable.      Interval history:  Overall, she is doing well.  We reviewed her Zio patch.  She reports having palpitations 2-3 times a week lasting 5 minutes.  Zio patch showed PVCs at 6.4%.  We discussed medications.  Medications declined.  She has been increasingly fatigued with joint pain and muscle aches. Seen by rheumatology on 12/2/2020 in [place].  She reports that she had multiple labs and work-up.  They are concerned that she may  have fibromyalgia.  She also had a GUSTAVO at the [facility] on 11/24/2020.  Showed mild to moderate mitral insufficiency.  Patient relieved.  No surgery indicated at this point.  I offered an echo in approximately 6 to 12 months.  She like to do in 6 months.  An echo was largely unchanged, she states she is okay with a phone call without follow-up.    HPI:    For the last few months, she has noted an irregular pulse. She has been increasingly fatigued.  She was seen in the clinic on 8/27/2024 for an intentional overdose on 7/6/2020 with trazodone.  She was taking iron for a mildly low hemoglobin.  Patient reports significant fatigue.  No chest pain but has shortness of breath with presumptive COPD with a long history of tobacco abuse starting at age 13. She has smoked up to 1.5 packs/day currently at a pack a day.  She has a quit date of October 15, 2020.  Lyme disease ordered with concerns for tick bite.  No history of autoimmune disease but describes pain in both arms and across the chest that is tender.  Patient does not believe she has sleep apnea denying snoring or stopping breathing at night.  Her  told her that she occasionally snores but not significant.  As part of the work-up, a stress echo was ordered to assess for heart disease as causing her symptoms.     She was to have a stress echo.  As part of pulmonary images, she was found to have moderate mitral insufficiency with a low normal EF of 50-55%.  Noted frequent PVCs as well.  There was no wall motion normality at rest.  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  Based on frequent PVCs as noted on EKG as well as a stress test and her moderate mitral insufficiency, she was referred to cardiology.     Of note, she was found to have a potassium of 2.6 on 7/6/2020.  There is also history of alcohol abuse and suicidal intentions.  She was noted to have lung nodule previously.      Relevant testing:  Zio patch on 9/30/20:  The patient was  monitored for 9 days, 16 hours.  Minimum HR was 55, average HR was 85, maximum HR was 207.  There were 10 triggered events.  During the patient triggered events, the rhythm was sinus with ectopy.  There were rare supraventricular ectopic beats, longest consisted of 13 beats.  There were frequent (6.4%) ventricular ectopic beats, longest consisted of 20 beats.  Atrial fibrillation is present     Impression:  Cardiac event monitor revealing paroxysmal supraventricular tachycardia, paroxysmal ventricular tachycardia, paroxysmal atrial fibrillation.    ECHO on 9/14/20:  Stress test not performed due to underlying MR and PVCs.   Borderline (EF 50-55%) reduced left ventricular function is present. Moderate   LV dilation. No regional wall motion abnormalities are seen.   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   IVC diameter <2.1 cm collapsing >50% with sniff suggests a normal RA pressure   of 3 mmHg.   No pericardial effusion is present.   Mild dilatation of the aorta is present.   There is no prior study for direct comparison.       Past Medical History:   Diagnosis Date     Age-related osteoporosis with current pathological fracture with routine healing     9/20/2017     Anxiety disorder     No Comments Provided     Appendicitis     No Comments Provided     Benign neoplasm of connective and other soft tissue, unspecified     Right Breast     Bilateral carpal tunnel syndrome      Brachial plexus disorders     6/24/2016     Chronic obstructive pulmonary disease (H)     No Comments Provided     Essential (primary) hypertension     No Comments Provided     Major depressive disorder, single episode     Depression with insomnia and tiredness     Other displaced fracture of upper end of left humerus, subsequent encounter for fracture with routine healing     6/7/2016     Other fracture of unspecified lower leg, initial encounter for closed fracture     Fracture, right ankle      Personal history of other medical treatment (CODE)      4, Para 4-0-0-4     Pure hypercholesterolemia     No Comments Provided     Rectal polyp     Hyperplastic polyp times three noted on colonoscopy 2007     Tobacco use     No Comments Provided     Uncomplicated alcohol dependence (H)     No Comments Provided       Past Surgical History:   Procedure Laterality Date     BRONCHOSCOPY DIAGNOSTIC  2018    MAC infection     CLOSED REDUCTION ANKLE      07,Open reduction/internal fixation, right ankle fracture 07.     COLONOSCOPY      ,screening for family history,  follow-up recommended in five years.     COLONOSCOPY      ,Colonoscopy 2007.  Follow-up recommended in five years.     COLONOSCOPY  2013,Normal, hemorrhoids - follow up 5 years for family history of colon cnacer     HYSTERECTOMY TOTAL ABDOMINAL      2007,Laparoscopic supracervical hysterectomy and bilateral salpingo-oophorectomy for bleeding fibroid     LAPAROSCOPIC BYPASS GASTRIC           LAPAROSCOPIC TUBAL LIGATION      1985     LOBECTOMY LUNG  2018    VATS - LOBECTOMY UPPER LUNG, [place], Dr Kohler     LUMPECTOMY BREAST      Lumpectomy of the right breast     RELEASE CARPAL TUNNEL Left 2019    Procedure: RELEASE CARPAL TUNNEL;  Surgeon: Andrew Sahni MD;  Location: [facility] OR     RELEASE CARPAL TUNNEL Right 2019    Procedure: Open Carpal Tunnel Release;  Surgeon: Andrew Sahni MD;  Location: [facility] OR     SKIN GRAFT, EACH ADDN 100SQCM  2007    Skin grafting post right ankle fracture       No Known Allergies    Current Outpatient Medications   Medication Sig Dispense Refill     albuterol (2.5 MG/3ML) 0.083% neb solution Take 1 vial (2.5 mg) by nebulization 4 times daily 360 mL 3     albuterol (PROAIR HFA) 108 (90 Base) MCG/ACT inhaler Inhale 2 puffs into the lungs 4 times daily as needed 3 Inhaler 0     alendronate (FOSAMAX) 70 MG tablet Take 1 tablet (70 mg) by mouth  every 7 days Take in the morning on empty stomach with full glass of water. Do not lie down for 1 hr. 13 tablet 3     azelastine (ASTEPRO) 0.15 % nasal spray Spray 2 sprays into both nostrils daily 30 mL 11     diclofenac (VOLTAREN) 1 % topical gel Apply 2 g topically 4 times daily 150 g 1     ferrous sulfate (FEROSUL) 325 (65 Fe) MG tablet Take 325 mg by mouth       fluticasone (FLONASE) 50 MCG/ACT nasal spray Spray 1 spray into both nostrils daily 18 g 11     gabapentin (NEURONTIN) 400 MG capsule Take 1 capsule (400 mg) by mouth daily AND 2 capsules (800 mg) At Bedtime. 270 capsule 3     hydrOXYzine (ATARAX) 10 MG tablet Take 1-2 tablets (10-20 mg) by mouth 3 times daily as needed 60 tablet 3     lisinopril (PRINIVIL/ZESTRIL) 5 MG tablet Take 1 tablet (5 mg) by mouth daily 90 tablet 4     omeprazole (PRILOSEC) 20 MG DR capsule Take 1 capsule (20 mg) by mouth daily 90 capsule 0     sodium chloride 3 % NEBU neb solution INHALE 3ML AS DIRECTED       traZODone (DESYREL) 150 MG tablet Take 1 tablet (150 mg) by mouth At Bedtime 90 tablet 3     venlafaxine (EFFEXOR-XR) 150 MG 24 hr capsule Take 2 capsules (300 mg) by mouth daily 180 capsule 3       Social History     Socioeconomic History     Marital status:      Spouse name: noris     Number of children: Not on file     Years of education: 11 GED     Highest education level: Not on file   Occupational History     Not on file   Social Needs     Financial resource strain: Not on file     Food insecurity     Worry: Not on file     Inability: Not on file     Transportation needs     Medical: Not on file     Non-medical: Not on file   Tobacco Use     Smoking status: Light Tobacco Smoker     Packs/day: 0.00     Years: 43.00     Pack years: 0.00     Types: Cigarettes     Smokeless tobacco: Never Used     Tobacco comment: 5 per day-has patches   Substance and Sexual Activity     Alcohol use: Not Currently     Alcohol/week: 0.0 standard drinks     Drug use: No      Comment: Drug use: No     Sexual activity: Yes     Partners: Male     Birth control/protection: Surgical   Lifestyle     Physical activity     Days per week: Not on file     Minutes per session: Not on file     Stress: Not on file   Relationships     Social connections     Talks on phone: Not on file     Gets together: Not on file     Attends [employer] service: Not on file     Active member of club or organization: Not on file     Attends meetings of clubs or organizations: Not on file     Relationship status: Not on file     Intimate partner violence     Fear of current or ex partner: Not on file     Emotionally abused: Not on file     Physically abused: Not on file     Forced sexual activity: Not on file   Other Topics Concern     Parent/sibling w/ CABG, MI or angioplasty before 65F 55M? Not Asked   Social History Narrative    . Drinks 6 beers daily.       LAB RESULTS:   Office Visit on 10/02/2020   Component Date Value Ref Range Status     TERRANCE interpretation 10/02/2020 Negative  NEG^Negative Final     Iron 10/02/2020 51  50 - 212 ug/dL Final     UIBC (Unsaturated) 10/02/2020 401.20  mg/dL Final     Iron Binding Capacity 10/02/2020 452.20* 245.00 - 400.00 ug/dL Final     Iron Saturation 10/02/2020 11* 20 - 55 % Final     Vitamin D Total 10/02/2020 29.4  ng/mL Final     Hepatitis C Antibody 10/02/2020 Nonreactive  NR^Nonreactive Final     CK Total 10/02/2020 79  30 - 223 U/L Final     Parvovirus B19 IgG 10/02/2020 5.46* <=0.90 IV Final     Parvovirus B19 IgM 10/02/2020 0.12  <=0.90 IV Final     Lyme Disease Antibodies Serum 10/02/2020 0.04  0.00 - 0.89 Final     Sed Rate 10/02/2020 14  1 - 15 mm/h Final     CRP Inflammation 10/02/2020 1.3  <10.0 mg/L Final   Office Visit on 09/29/2020   Component Date Value Ref Range Status     Interpretation ECG 09/29/2020 Click View Image link to view waveform and result   Final     Sodium 09/29/2020 140  134 - 144 mmol/L Final     Potassium 09/29/2020 4.4  3.5 - 5.1  "mmol/L Final     Chloride 09/29/2020 104  98 - 107 mmol/L Final     Carbon Dioxide 09/29/2020 30  21 - 31 mmol/L Final     Anion Gap 09/29/2020 6  3 - 14 mmol/L Final     Glucose 09/29/2020 81  70 - 105 mg/dL Final     Urea Nitrogen 09/29/2020 14  7 - 25 mg/dL Final     Creatinine 09/29/2020 1.02  0.60 - 1.20 mg/dL Final     GFR Estimate 09/29/2020 55* >60 mL/min/[1.73_m2] Final     GFR Estimate If Black 09/29/2020 67  >60 mL/min/[1.73_m2] Final     Calcium 09/29/2020 9.5  8.6 - 10.3 mg/dL Final     Bilirubin Total 09/29/2020 0.4  0.3 - 1.0 mg/dL Final     Albumin 09/29/2020 4.1  3.5 - 5.7 g/dL Final     Protein Total 09/29/2020 6.9  6.4 - 8.9 g/dL Final     Alkaline Phosphatase 09/29/2020 67  34 - 104 U/L Final     ALT 09/29/2020 21  7 - 52 U/L Final     AST 09/29/2020 26  13 - 39 U/L Final     Magnesium 09/29/2020 1.9  1.9 - 2.7 mg/dL Final     Folate 09/29/2020 7.6  >5.21 ng/mL Final     Vitamin B12 09/29/2020 302  180 - 914 pg/mL Final     N-Terminal Pro Bnp 09/29/2020 132* 0 - 100 pg/mL Final        Review of systems: Negative except that which was noted in the HPI.    Physical examination:  /80 (BP Location: Right arm, Patient Position: Sitting, Cuff Size: Adult Regular)   Pulse 71   Temp 98.4  F (36.9  C) (Tympanic)   Resp 18   Ht 1.62 m (5' 3.78\")   Wt 58.5 kg (129 lb)   SpO2 100%   BMI 22.30 kg/m      GENERAL APPEARANCE: healthy, alert and no distress  HEENT: no icterus, no xanthelasmas, normal pupil size and reaction, no cyanosis.  NECK: no adenopathy, no asymmetry, masses, or scars, no cervical bruits, JVP is not visible  CHEST: lungs clear to auscultation - no rales, rhonchi or wheezes, no use of accessory muscles, no retractions, respirations are unlabored, normal respiratory rate  CARDIOVASCULAR: regular rhythm, normal S1 with physiologic split S2, no S3 or S4 with (+) murmur, click or rub  ABDOMEN: soft, non tender, bowel sounds normal  EXTREMITIES: no clubbing, cyanosis with mild " peripheral edema  NEURO: alert and oriented normal speech, and affect  VASC: No vascular bruits heard.  SKIN: no ecchymoses, no rashes      Thank you for allowing me to participate in the care of your patient. Please do not hesitate to contact me if you have any questions.     Jose Guadalupe Lechuga, DO

## 2020-12-11 ASSESSMENT — ANXIETY QUESTIONNAIRES: GAD7 TOTAL SCORE: 0

## 2020-12-15 ENCOUNTER — OFFICE VISIT (OUTPATIENT)
Dept: FAMILY MEDICINE | Facility: OTHER | Age: 60
End: 2020-12-15
Attending: FAMILY MEDICINE
Payer: COMMERCIAL

## 2020-12-15 VITALS
BODY MASS INDEX: 22.36 KG/M2 | SYSTOLIC BLOOD PRESSURE: 112 MMHG | WEIGHT: 129.4 LBS | OXYGEN SATURATION: 100 % | RESPIRATION RATE: 20 BRPM | DIASTOLIC BLOOD PRESSURE: 78 MMHG | TEMPERATURE: 98.3 F | HEART RATE: 84 BPM

## 2020-12-15 DIAGNOSIS — N30.01 ACUTE CYSTITIS WITH HEMATURIA: Primary | ICD-10-CM

## 2020-12-15 LAB
ALBUMIN UR-MCNC: 100 MG/DL
APPEARANCE UR: ABNORMAL
BACTERIA #/AREA URNS HPF: ABNORMAL /HPF
BILIRUB UR QL STRIP: NEGATIVE
COLOR UR AUTO: YELLOW
GLUCOSE UR STRIP-MCNC: NEGATIVE MG/DL
HGB UR QL STRIP: ABNORMAL
KETONES UR STRIP-MCNC: NEGATIVE MG/DL
LEUKOCYTE ESTERASE UR QL STRIP: ABNORMAL
NITRATE UR QL: NEGATIVE
PH UR STRIP: 7.5 PH (ref 5–7)
RBC #/AREA URNS AUTO: >182 /HPF (ref 0–2)
SOURCE: ABNORMAL
SP GR UR STRIP: 1.02 (ref 1–1.03)
SQUAMOUS #/AREA URNS AUTO: 4 /HPF (ref 0–1)
UROBILINOGEN UR STRIP-MCNC: 2 MG/DL (ref 0–2)
WBC #/AREA URNS AUTO: >182 /HPF (ref 0–5)

## 2020-12-15 PROCEDURE — 87088 URINE BACTERIA CULTURE: CPT | Mod: ZL | Performed by: FAMILY MEDICINE

## 2020-12-15 PROCEDURE — 99213 OFFICE O/P EST LOW 20 MIN: CPT | Performed by: FAMILY MEDICINE

## 2020-12-15 PROCEDURE — 87086 URINE CULTURE/COLONY COUNT: CPT | Mod: ZL | Performed by: FAMILY MEDICINE

## 2020-12-15 PROCEDURE — 81001 URINALYSIS AUTO W/SCOPE: CPT | Mod: ZL | Performed by: FAMILY MEDICINE

## 2020-12-15 RX ORDER — SULFAMETHOXAZOLE/TRIMETHOPRIM 800-160 MG
1 TABLET ORAL 2 TIMES DAILY
Qty: 14 TABLET | Refills: 0 | Status: SHIPPED | OUTPATIENT
Start: 2020-12-15 | End: 2020-12-22

## 2020-12-15 ASSESSMENT — PAIN SCALES - GENERAL: PAINLEVEL: MODERATE PAIN (4)

## 2020-12-15 NOTE — NURSING NOTE
Patient here for hematuria and dysuria for the past 4 days. Medication Reconciliation: complete.    Shirin Charles LPN  12/15/2020 3:17 PM

## 2020-12-15 NOTE — PROGRESS NOTES
SUBJECTIVE:   Lisy Telles is a 60 year old female who presents to clinic today for the following health issues: Dysuria    Patient arrives here for dysuria.  States ambulatory.  She states it feels like previous tract infections in the past.  Reports fevers chills.  No back pain out of the ordinary.  She is also noticed blood in the urine.  She has been trying to drink plenty of fluids without much relief.        Patient Active Problem List    Diagnosis Date Noted     PVC's (premature ventricular contractions) 09/29/2020     Priority: Medium     Moderate mitral regurgitation 09/29/2020     Priority: Medium     Hypokalemia 09/29/2020     Priority: Medium     Mixed hyperlipidemia 09/29/2020     Priority: Medium     Tobacco abuse counseling 09/29/2020     Priority: Medium     Bilateral enlargement of atria 09/29/2020     Priority: Medium     Irregular heart rate 09/29/2020     Priority: Medium     S/p bilateral carpal tunnel release 04/16/2019     Priority: Medium     Right carpal tunnel syndrome 03/27/2019     Priority: Medium     Bilateral carpal tunnel syndrome 12/04/2018     Priority: Medium     De Quervain's disease (tenosynovitis) 12/04/2018     Priority: Medium     Restless legs syndrome (RLS) 06/29/2018     Priority: Medium     Anxiety 01/24/2018     Priority: Medium     COPD 01/24/2018     Priority: Medium     Alcoholism in recovery (H) 10/05/2017     Priority: Medium     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) 10/05/2017     Priority: Medium     Lung nodule < 6cm on CT 10/05/2017     Priority: Medium     Age-related osteoporosis with current pathological fracture with routine healing 09/20/2017     Priority: Medium     Bronchiectasis with acute lower respiratory infection (H) 09/13/2017     Priority: Medium     Tobacco abuse 09/11/2017     Priority: Medium     Axillary nerve palsy 06/24/2016     Priority: Medium     Closed 4-part fracture of proximal end of left humerus 06/07/2016      Priority: Medium     Liver laceration, grade IV, with open wound into cavity 09/04/2015     Priority: Medium     Pneumothorax, closed, traumatic 09/04/2015     Priority: Medium     Spleen laceration 09/04/2015     Priority: Medium     Traumatic brain injury with loss of consciousness of 30 minutes or less (H) 09/04/2015     Priority: Medium     Alcohol withdrawal (H) 04/08/2015     Priority: Medium     Essential hypertension 07/14/2014     Priority: Medium     Sprain of unspecified site of back 08/15/2012     Priority: Medium     Recurrent major depression in partial remission (H) 10/13/2010     Priority: Medium       Review of Systems     OBJECTIVE:     /78   Pulse 84   Temp 98.3  F (36.8  C)   Resp 20   Wt 58.7 kg (129 lb 6.4 oz)   SpO2 100%   BMI 22.36 kg/m    Body mass index is 22.36 kg/m .  Physical Exam  Constitutional:       Appearance: Normal appearance.   Abdominal:      General: There is no distension.      Palpations: There is no mass.      Tenderness: There is no abdominal tenderness.   Musculoskeletal:      Comments: No CVA tenderness   Neurological:      Mental Status: She is alert.         Diagnostic Test Results:  Results for orders placed or performed in visit on 12/15/20 (from the past 24 hour(s))   UA reflex to Microscopic and Culture    Specimen: Unspecified Urine   Result Value Ref Range    Color Urine Yellow     Appearance Urine Slightly Cloudy     Glucose Urine Negative NEG^Negative mg/dL    Bilirubin Urine Negative NEG^Negative    Ketones Urine Negative NEG^Negative mg/dL    Specific Gravity Urine 1.021 1.003 - 1.035    Blood Urine Large (A) NEG^Negative    pH Urine 7.5 (H) 5.0 - 7.0 pH    Protein Albumin Urine 100 (A) NEG^Negative mg/dL    Urobilinogen mg/dL 2.0 0.0 - 2.0 mg/dL    Nitrite Urine Negative NEG^Negative    Leukocyte Esterase Urine Large (A) NEG^Negative    Source Unspecified Urine     RBC Urine >182 (H) 0 - 2 /HPF    WBC Urine >182 (H) 0 - 5 /HPF    Bacteria Urine  Few (A) NEG^Negative /HPF    Squamous Epithelial /HPF Urine 4 (H) 0 - 1 /HPF       ASSESSMENT/PLAN:         1. Hematuria    - UA reflex to Microscopic and Culture  - Urine Culture Aerobic Bacterial    2. Acute cystitis with hematuria  We will start Bactrim DS.  Follow-up if not improving.  - sulfamethoxazole-trimethoprim (BACTRIM DS) 800-160 MG tablet; Take 1 tablet by mouth 2 times daily for 7 days  Dispense: 14 tablet; Refill: 0      Sandro Bolden MD  [facility]

## 2020-12-17 LAB
BACTERIA SPEC CULT: ABNORMAL
SPECIMEN SOURCE: ABNORMAL

## 2021-01-06 ENCOUNTER — OFFICE VISIT (OUTPATIENT)
Dept: FAMILY MEDICINE | Facility: OTHER | Age: 61
End: 2021-01-06
Attending: FAMILY MEDICINE
Payer: COMMERCIAL

## 2021-01-06 VITALS
RESPIRATION RATE: 22 BRPM | TEMPERATURE: 97.4 F | WEIGHT: 129.25 LBS | BODY MASS INDEX: 22.34 KG/M2 | HEART RATE: 84 BPM | OXYGEN SATURATION: 97 % | DIASTOLIC BLOOD PRESSURE: 70 MMHG | SYSTOLIC BLOOD PRESSURE: 110 MMHG

## 2021-01-06 DIAGNOSIS — Z80.0 FAMILY HISTORY OF COLON CANCER: ICD-10-CM

## 2021-01-06 DIAGNOSIS — Z87.891 PERSONAL HISTORY OF TOBACCO USE: Primary | ICD-10-CM

## 2021-01-06 DIAGNOSIS — Z12.31 VISIT FOR SCREENING MAMMOGRAM: ICD-10-CM

## 2021-01-06 PROCEDURE — 99213 OFFICE O/P EST LOW 20 MIN: CPT | Mod: 25 | Performed by: FAMILY MEDICINE

## 2021-01-06 PROCEDURE — 90471 IMMUNIZATION ADMIN: CPT | Performed by: FAMILY MEDICINE

## 2021-01-06 PROCEDURE — 90732 PPSV23 VACC 2 YRS+ SUBQ/IM: CPT | Performed by: FAMILY MEDICINE

## 2021-01-06 PROCEDURE — G0296 VISIT TO DETERM LDCT ELIG: HCPCS | Performed by: FAMILY MEDICINE

## 2021-01-06 ASSESSMENT — ANXIETY QUESTIONNAIRES
2. NOT BEING ABLE TO STOP OR CONTROL WORRYING: NOT AT ALL
1. FEELING NERVOUS, ANXIOUS, OR ON EDGE: NOT AT ALL
5. BEING SO RESTLESS THAT IT IS HARD TO SIT STILL: NOT AT ALL
IF YOU CHECKED OFF ANY PROBLEMS ON THIS QUESTIONNAIRE, HOW DIFFICULT HAVE THESE PROBLEMS MADE IT FOR YOU TO DO YOUR WORK, TAKE CARE OF THINGS AT HOME, OR GET ALONG WITH OTHER PEOPLE: NOT DIFFICULT AT ALL
3. WORRYING TOO MUCH ABOUT DIFFERENT THINGS: NOT AT ALL
6. BECOMING EASILY ANNOYED OR IRRITABLE: NOT AT ALL
7. FEELING AFRAID AS IF SOMETHING AWFUL MIGHT HAPPEN: NOT AT ALL
GAD7 TOTAL SCORE: 0

## 2021-01-06 ASSESSMENT — PAIN SCALES - GENERAL: PAINLEVEL: MILD PAIN (2)

## 2021-01-06 ASSESSMENT — PATIENT HEALTH QUESTIONNAIRE - PHQ9
SUM OF ALL RESPONSES TO PHQ QUESTIONS 1-9: 0
5. POOR APPETITE OR OVEREATING: NOT AT ALL

## 2021-01-06 NOTE — PATIENT INSTRUCTIONS

## 2021-01-06 NOTE — PROGRESS NOTES
"Nursing Notes:   Laura Gee LPN  1/6/2021  3:54 PM  Signed  Patient presents to the clinic to discuss CT lung cancer screening.      Chief Complaint   Patient presents with     Clinic Care Coordination - Follow-up     lung cancer screening       Initial /70 (BP Location: Right arm, Patient Position: Sitting, Cuff Size: Adult Regular)   Pulse 84   Temp 97.4  F (36.3  C) (Tympanic)   Resp 22   Wt 58.6 kg (129 lb 4 oz)   SpO2 97%   BMI 22.34 kg/m   Estimated body mass index is 22.34 kg/m  as calculated from the following:    Height as of 12/10/20: 1.62 m (5' 3.78\").    Weight as of this encounter: 58.6 kg (129 lb 4 oz).  Medication Reconciliation: complete    Laura Gee LPN           Assessment & Plan       ICD-10-CM    1. Personal history of tobacco use  Z87.891 Prof fee: Shared Decisionmaking for Lung Cancer Screening     CT Chest Lung Cancer Scrn Low Dose wo     [facility]-  PNEUMOCOCCAL VACCINE,ADULT,SQ OR IM   2. Visit for screening mammogram  Z12.31 MA Screen Bilateral w/Rhys   3. Family history of colon cancer  Z80.0 GASTROENTEROLOGY ADULT REF PROCEDURE ONLY     CT ordered for lung cancer screening to be obtained over 1 year from last, so after Jan 20, 2021.  Recommend smoking cessation, she is not ready at this time and declines assistance.     Mammogram last in 2013, ordered    Referral for colonoscopy, 6 year from last with FHx of colon cancer in mother.      Follow up as needed     Silvino Peace MD   [facility]      SUBJECTIVE:  60 year old female presents for cancer screenings.    She has been participating in annual low dose CT scanning for lung cancer. Last scan Jan 2020 with multiple small nodules. Continues smoking 1/2 ppd. She knows she should quit, but with COVID feels mental health is not conducive to quitting.    No recent mammogram, agreeable to obtain    Due for colonoscopy with FHx of colon cancer in mother.     Still dealing with aching, saw " rheumatology and no underlying auto-immunte etiology was determined. Diagnosed with fibromyalgia. Working on improving sleep.    REVIEW OF SYSTEMS:    Constitutional: negative  Respiratory: negative  Cardiovascular: negative    Current Outpatient Medications   Medication Sig Dispense Refill     albuterol (2.5 MG/3ML) 0.083% neb solution Take 1 vial (2.5 mg) by nebulization 4 times daily 360 mL 3     albuterol (PROAIR HFA) 108 (90 Base) MCG/ACT inhaler Inhale 2 puffs into the lungs 4 times daily as needed 3 Inhaler 0     alendronate (FOSAMAX) 70 MG tablet Take 1 tablet (70 mg) by mouth every 7 days Take in the morning on empty stomach with full glass of water. Do not lie down for 1 hr. 13 tablet 3     azelastine (ASTEPRO) 0.15 % nasal spray Spray 2 sprays into both nostrils daily 30 mL 11     diclofenac (VOLTAREN) 1 % topical gel Apply 2 g topically 4 times daily 150 g 1     ferrous sulfate (FEROSUL) 325 (65 Fe) MG tablet Take 325 mg by mouth       fluticasone (FLONASE) 50 MCG/ACT nasal spray Spray 1 spray into both nostrils daily 18 g 11     gabapentin (NEURONTIN) 400 MG capsule Take 1 capsule (400 mg) by mouth daily AND 2 capsules (800 mg) At Bedtime. 270 capsule 3     hydrOXYzine (ATARAX) 10 MG tablet Take 1-2 tablets (10-20 mg) by mouth 3 times daily as needed 60 tablet 3     lisinopril (PRINIVIL/ZESTRIL) 5 MG tablet Take 1 tablet (5 mg) by mouth daily 90 tablet 4     omeprazole (PRILOSEC) 20 MG DR capsule Take 1 capsule (20 mg) by mouth daily 90 capsule 0     sodium chloride 3 % NEBU neb solution INHALE 3ML AS DIRECTED       traZODone (DESYREL) 150 MG tablet Take 1 tablet (150 mg) by mouth At Bedtime 90 tablet 3     venlafaxine (EFFEXOR-XR) 150 MG 24 hr capsule Take 2 capsules (300 mg) by mouth daily 180 capsule 3     No Known Allergies    OBJECTIVE:  /70 (BP Location: Right arm, Patient Position: Sitting, Cuff Size: Adult Regular)   Pulse 84   Temp 97.4  F (36.3  C) (Tympanic)   Resp 22   Wt 58.6 kg  (129 lb 4 oz)   SpO2 97%   BMI 22.34 kg/m      EXAM:  General Appearance: Alert. No acute distress  Psychiatric: Normal affect and mentation        This document was prepared using a combination of typing and voice generated software.  While every attempt was made for accuracy, spelling and grammatical errors may exist.       Lung Cancer Screening Shared Decision Making Visit     Lisy Telles is eligible for lung cancer screening on the basis of the information provided in my signed lung cancer screening order.     I have discussed with patient the risks and benefits of screening for lung cancer with low-dose CT.     The risks include:  radiation exposure: one low dose chest CT has as much ionizing radiation as about 15 chest x-rays or 6 months of background radiation living in [place]    false positives: 96% of positive findings/nodules are NOT cancer, but some might still require additional diagnostic evaluation, including biopsy  over-diagnosis: some slow growing cancers that might never have been clinically significant will be detected and treated unnecessarily     The benefit of early detection of lung cancer is contingent upon adherence to annual screening or more frequent follow up if indicated.     Furthermore, reaping the benefits of screening requires Lisy Telles to be willing and physically able to undergo diagnostic procedures, if indicated. Although no specific guide is available for determining severity of comorbidities, it is reasonable to withhold screening in patients who have greater mortality risk from other diseases.     We did discuss that the only way to prevent lung cancer is to not smoke. Smoking cessation counseling was given, duration < 3 minutes.      I did not offer risk estimation using a calculator such as this one:    ShouldIScreen

## 2021-01-06 NOTE — NURSING NOTE
"Patient presents to the clinic to discuss CT lung cancer screening.      Chief Complaint   Patient presents with     Clinic Care Coordination - Follow-up     lung cancer screening       Initial /70 (BP Location: Right arm, Patient Position: Sitting, Cuff Size: Adult Regular)   Pulse 84   Temp 97.4  F (36.3  C) (Tympanic)   Resp 22   Wt 58.6 kg (129 lb 4 oz)   SpO2 97%   BMI 22.34 kg/m   Estimated body mass index is 22.34 kg/m  as calculated from the following:    Height as of 12/10/20: 1.62 m (5' 3.78\").    Weight as of this encounter: 58.6 kg (129 lb 4 oz).  Medication Reconciliation: complete    Laura Gee LPN      "

## 2021-01-06 NOTE — NURSING NOTE
Immunization Documentation    Prior to Immunization administration, verified patients identity using patient's name and date of birth. Please see IMMUNIZATIONS  and order for additional information.  Patient / Parent instructed to remain in clinic for 15 minutes and report any adverse reaction to staff immediately.    Was entire vial of medication used? Yes  Vial/Syringe: Zamzam Gee LPN  1/6/2021   4:15 PM

## 2021-01-07 DIAGNOSIS — Z80.0 FH: COLON CANCER: Primary | ICD-10-CM

## 2021-01-07 RX ORDER — BISACODYL 5 MG
TABLET, DELAYED RELEASE (ENTERIC COATED) ORAL
Qty: 2 TABLET | Refills: 0 | Status: ON HOLD | OUTPATIENT
Start: 2021-01-07 | End: 2021-02-01

## 2021-01-07 ASSESSMENT — ANXIETY QUESTIONNAIRES: GAD7 TOTAL SCORE: 0

## 2021-01-07 NOTE — TELEPHONE ENCOUNTER
Screening Questions for the Scheduling of Screening Colonoscopies   (If Colonoscopy is diagnostic, Provider should review the chart before scheduling.)  Are you younger than 50 or older than 80?   NO   Do you take aspirin or fish oil?  NO  (if yes, tell patient to stop 1 week prior to Colonoscopy)  Do you take warfarin (Coumadin), clopidogrel (Plavix), apixaban (Eliquis), dabigatram (Pradaxa), rivaroxaban (Xarelto) or any blood thinner? NO   Do you use oxygen at home?  NO   Do you have kidney disease? NO   Are you on dialysis? NO   Have you had a stroke or heart attack in the last year? NO   Have you had a stent in your heart or any blood vessel in the last year? NO   Have you had a transplant of any organ? NO   Have you had a colonoscopy or upper endoscopy (EGD) before? YES          When?  5 YRS AGO   Date of scheduled Colonoscopy. 02/01/2021  Provider TRACEY   [facility]

## 2021-01-25 ENCOUNTER — HOSPITAL ENCOUNTER (OUTPATIENT)
Dept: CT IMAGING | Facility: OTHER | Age: 61
End: 2021-01-25
Attending: FAMILY MEDICINE
Payer: COMMERCIAL

## 2021-01-25 ENCOUNTER — HOSPITAL ENCOUNTER (OUTPATIENT)
Dept: MAMMOGRAPHY | Facility: OTHER | Age: 61
End: 2021-01-25
Attending: FAMILY MEDICINE
Payer: COMMERCIAL

## 2021-01-25 DIAGNOSIS — Z87.891 PERSONAL HISTORY OF TOBACCO USE: ICD-10-CM

## 2021-01-25 DIAGNOSIS — Z12.31 VISIT FOR SCREENING MAMMOGRAM: ICD-10-CM

## 2021-01-25 PROCEDURE — 77063 BREAST TOMOSYNTHESIS BI: CPT

## 2021-01-25 PROCEDURE — 71271 CT THORAX LUNG CANCER SCR C-: CPT

## 2021-01-26 PROBLEM — Z80.0 FH: COLON CANCER: Status: ACTIVE | Noted: 2021-01-26

## 2021-01-28 ENCOUNTER — ALLIED HEALTH/NURSE VISIT (OUTPATIENT)
Dept: FAMILY MEDICINE | Facility: OTHER | Age: 61
End: 2021-01-28
Attending: SURGERY
Payer: COMMERCIAL

## 2021-01-28 DIAGNOSIS — Z80.0 FH: COLON CANCER: ICD-10-CM

## 2021-01-28 LAB
SARS-COV-2 RNA RESP QL NAA+PROBE: NORMAL
SPECIMEN SOURCE: NORMAL

## 2021-01-28 PROCEDURE — U0005 INFEC AGEN DETEC AMPLI PROBE: HCPCS | Mod: ZL | Performed by: SURGERY

## 2021-01-28 PROCEDURE — U0003 INFECTIOUS AGENT DETECTION BY NUCLEIC ACID (DNA OR RNA); SEVERE ACUTE RESPIRATORY SYNDROME CORONAVIRUS 2 (SARS-COV-2) (CORONAVIRUS DISEASE [COVID-19]), AMPLIFIED PROBE TECHNIQUE, MAKING USE OF HIGH THROUGHPUT TECHNOLOGIES AS DESCRIBED BY CMS-2020-01-R: HCPCS | Mod: ZL | Performed by: SURGERY

## 2021-01-28 PROCEDURE — C9803 HOPD COVID-19 SPEC COLLECT: HCPCS

## 2021-01-29 LAB
LABORATORY COMMENT REPORT: NORMAL
SARS-COV-2 RNA RESP QL NAA+PROBE: NEGATIVE
SPECIMEN SOURCE: NORMAL

## 2021-02-01 ENCOUNTER — HOSPITAL ENCOUNTER (OUTPATIENT)
Facility: OTHER | Age: 61
Discharge: HOME | End: 2021-02-01
Attending: SURGERY | Admitting: SURGERY
Payer: COMMERCIAL

## 2021-02-01 ENCOUNTER — ANESTHESIA EVENT (OUTPATIENT)
Dept: SURGERY | Facility: OTHER | Age: 61
End: 2021-02-01
Payer: COMMERCIAL

## 2021-02-01 ENCOUNTER — ANESTHESIA (OUTPATIENT)
Dept: SURGERY | Facility: OTHER | Age: 61
End: 2021-02-01
Payer: COMMERCIAL

## 2021-02-01 VITALS
SYSTOLIC BLOOD PRESSURE: 107 MMHG | OXYGEN SATURATION: 98 % | BODY MASS INDEX: 22.34 KG/M2 | DIASTOLIC BLOOD PRESSURE: 63 MMHG | HEART RATE: 70 BPM | WEIGHT: 129.25 LBS | RESPIRATION RATE: 14 BRPM | TEMPERATURE: 97.4 F

## 2021-02-01 PROBLEM — K63.5 COLON POLYP: Status: ACTIVE | Noted: 2021-02-01

## 2021-02-01 PROCEDURE — 45380 COLONOSCOPY AND BIOPSY: CPT | Performed by: SURGERY

## 2021-02-01 PROCEDURE — 250N000013 HC RX MED GY IP 250 OP 250 PS 637: Performed by: SURGERY

## 2021-02-01 PROCEDURE — 45384 COLONOSCOPY W/LESION REMOVAL: CPT | Mod: PT | Performed by: SURGERY

## 2021-02-01 PROCEDURE — 999N000010 HC STATISTIC ANES STAT CODE-CRNA PER MINUTE: Performed by: SURGERY

## 2021-02-01 PROCEDURE — 45384 COLONOSCOPY W/LESION REMOVAL: CPT | Performed by: NURSE ANESTHETIST, CERTIFIED REGISTERED

## 2021-02-01 PROCEDURE — 258N000003 HC RX IP 258 OP 636: Performed by: SURGERY

## 2021-02-01 PROCEDURE — 250N000011 HC RX IP 250 OP 636: Performed by: NURSE ANESTHETIST, CERTIFIED REGISTERED

## 2021-02-01 PROCEDURE — 250N000009 HC RX 250: Performed by: SURGERY

## 2021-02-01 PROCEDURE — 88305 TISSUE EXAM BY PATHOLOGIST: CPT

## 2021-02-01 PROCEDURE — 250N000009 HC RX 250: Performed by: NURSE ANESTHETIST, CERTIFIED REGISTERED

## 2021-02-01 RX ORDER — PROPOFOL 10 MG/ML
INJECTION, EMULSION INTRAVENOUS CONTINUOUS PRN
Status: DISCONTINUED | OUTPATIENT
Start: 2021-02-01 | End: 2021-02-01

## 2021-02-01 RX ORDER — SIMETHICONE 40MG/0.6ML
SUSPENSION, DROPS(FINAL DOSAGE FORM)(ML) ORAL PRN
Status: DISCONTINUED | OUTPATIENT
Start: 2021-02-01 | End: 2021-02-01 | Stop reason: HOSPADM

## 2021-02-01 RX ORDER — SODIUM CHLORIDE, SODIUM LACTATE, POTASSIUM CHLORIDE, CALCIUM CHLORIDE 600; 310; 30; 20 MG/100ML; MG/100ML; MG/100ML; MG/100ML
INJECTION, SOLUTION INTRAVENOUS CONTINUOUS
Status: DISCONTINUED | OUTPATIENT
Start: 2021-02-01 | End: 2021-02-01 | Stop reason: HOSPADM

## 2021-02-01 RX ORDER — NALOXONE HYDROCHLORIDE 0.4 MG/ML
0.2 INJECTION, SOLUTION INTRAMUSCULAR; INTRAVENOUS; SUBCUTANEOUS
Status: DISCONTINUED | OUTPATIENT
Start: 2021-02-01 | End: 2021-02-01 | Stop reason: HOSPADM

## 2021-02-01 RX ORDER — PROPOFOL 10 MG/ML
INJECTION, EMULSION INTRAVENOUS PRN
Status: DISCONTINUED | OUTPATIENT
Start: 2021-02-01 | End: 2021-02-01

## 2021-02-01 RX ORDER — NALOXONE HYDROCHLORIDE 0.4 MG/ML
0.4 INJECTION, SOLUTION INTRAMUSCULAR; INTRAVENOUS; SUBCUTANEOUS
Status: DISCONTINUED | OUTPATIENT
Start: 2021-02-01 | End: 2021-02-01 | Stop reason: HOSPADM

## 2021-02-01 RX ORDER — LIDOCAINE 40 MG/G
CREAM TOPICAL
Status: DISCONTINUED | OUTPATIENT
Start: 2021-02-01 | End: 2021-02-01 | Stop reason: HOSPADM

## 2021-02-01 RX ORDER — ONDANSETRON 2 MG/ML
4 INJECTION INTRAMUSCULAR; INTRAVENOUS
Status: DISCONTINUED | OUTPATIENT
Start: 2021-02-01 | End: 2021-02-01 | Stop reason: HOSPADM

## 2021-02-01 RX ORDER — LIDOCAINE HYDROCHLORIDE 20 MG/ML
INJECTION, SOLUTION INFILTRATION; PERINEURAL PRN
Status: DISCONTINUED | OUTPATIENT
Start: 2021-02-01 | End: 2021-02-01

## 2021-02-01 RX ORDER — FLUMAZENIL 0.1 MG/ML
0.2 INJECTION, SOLUTION INTRAVENOUS
Status: DISCONTINUED | OUTPATIENT
Start: 2021-02-01 | End: 2021-02-01 | Stop reason: HOSPADM

## 2021-02-01 RX ADMIN — SODIUM CHLORIDE, POTASSIUM CHLORIDE, SODIUM LACTATE AND CALCIUM CHLORIDE: 600; 310; 30; 20 INJECTION, SOLUTION INTRAVENOUS at 09:44

## 2021-02-01 RX ADMIN — PROPOFOL 140 MCG/KG/MIN: 10 INJECTION, EMULSION INTRAVENOUS at 09:59

## 2021-02-01 RX ADMIN — LIDOCAINE HYDROCHLORIDE 40 MG: 20 INJECTION, SOLUTION INFILTRATION; PERINEURAL at 09:59

## 2021-02-01 RX ADMIN — PROPOFOL 40 MG: 10 INJECTION, EMULSION INTRAVENOUS at 09:59

## 2021-02-01 ASSESSMENT — ENCOUNTER SYMPTOMS: DYSRHYTHMIAS: 1

## 2021-02-01 ASSESSMENT — LIFESTYLE VARIABLES: TOBACCO_USE: 1

## 2021-02-01 ASSESSMENT — COPD QUESTIONNAIRES
COPD: 1
CAT_SEVERITY: MODERATE

## 2021-02-01 NOTE — ANESTHESIA POSTPROCEDURE EVALUATION
Patient: Lisy Telles    Procedure(s):  COLONOSCOPY, WITH POLYPECTOMY AND BIOPSY    Diagnosis:Screening for colon cancer [Z12.11]  Family history of colon cancer [Z80.0]  Diagnosis Additional Information: No value filed.    Anesthesia Type:  MAC    Note:  Disposition: Outpatient   Postop Pain Control: Uneventful            Sign Out: Well controlled pain   PONV: No   Neuro/Psych: Uneventful            Sign Out: Acceptable/Baseline neuro status   Airway/Respiratory: Uneventful            Sign Out: Acceptable/Baseline resp. status   CV/Hemodynamics: Uneventful            Sign Out: Acceptable CV status   Other NRE: NONE   DID A NON-ROUTINE EVENT OCCUR? No         Last vitals:  Vitals:    02/01/21 0926 02/01/21 1030 02/01/21 1045   BP: 119/74 (!) 87/53 (!) 106/27   Pulse: 66 63 64   Resp: 16 14    Temp: 98.4  F (36.9  C) 97.4  F (36.3  C)    SpO2: 100% 94% 99%       Electronically Signed By: KELI Smith CRNA  February 1, 2021  11:46 AM

## 2021-02-01 NOTE — ANESTHESIA CARE TRANSFER NOTE
Patient: Lisy Telles    Procedure(s):  COLONOSCOPY, WITH POLYPECTOMY AND BIOPSY    Diagnosis: Screening for colon cancer [Z12.11]  Family history of colon cancer [Z80.0]  Diagnosis Additional Information: No value filed.    Anesthesia Type:   MAC     Note:      Level of Consciousness: drowsy  Oxygen Supplementation: room air    Independent Airway: airway patency satisfactory and stable    Vital Signs Stable: post-procedure vital signs reviewed and stable  Report to RN Given: handoff report given  Patient transferred to: Phase II    Handoff Report: Identifed the Patient, Identified the Reponsible Provider, Reviewed the pertinent medical history, Discussed the surgical course, Reviewed Intra-OP anesthesia mangement and issues during anesthesia, Set expectations for post-procedure period and Allowed opportunity for questions and acknowledgement of understanding      Vitals: (Last set prior to Anesthesia Care Transfer)  CRNA VITALS  2/1/2021 0956 - 2/1/2021 1027      2/1/2021             Resp Rate (set):  10        Electronically Signed By: KELI ALLEN CRNA  February 1, 2021  10:27 AM

## 2021-02-01 NOTE — DISCHARGE INSTRUCTIONS
[facility]  Adult Discharge Orders & Instructions    ________________________________________________________________          For 12 hours after surgery  1. Get plenty of rest.  A responsible adult must stay with you for at least 12 hours after you leave the hospital.   2. You may feel lightheaded.  IF so, sit for a few minutes before standing.  Have someone help you get up.   3. You may have a slight fever. Call the doctor if your fever is over 101 F (38.3 C) (taken under the tongue) or lasts longer than 24 hours.  4. You may have a dry mouth, a sore throat, muscle aches or trouble sleeping.  These should go away after 24 hours.  5. Do not make important or legal decisions.  6.   Do not drive or use heavy equipment.  If you have weakness or tingling, don't drive or use heavy equipment until this feeling goes away.    To contact a doctor, call   [phone]

## 2021-02-01 NOTE — H&P
History and Physical    CHIEF COMPLAINT / REASON FOR PROCEDURE:   colon cancer    PERTINENT HISTORY   Patient is a 60 year old female who presents today for colonoscopy for FH colon cancer.   Last colonoscopy .  Patient has no complaints.    Past Medical History:   Diagnosis Date     Age-related osteoporosis with current pathological fracture with routine healing     2017     Anxiety disorder     No Comments Provided     Appendicitis     No Comments Provided     Benign neoplasm of connective and other soft tissue, unspecified     Right Breast     Bilateral carpal tunnel syndrome      Brachial plexus disorders     2016     Chronic obstructive pulmonary disease (H)     No Comments Provided     Essential (primary) hypertension     No Comments Provided     Major depressive disorder, single episode     Depression with insomnia and tiredness     Other displaced fracture of upper end of left humerus, subsequent encounter for fracture with routine healing     2016     Other fracture of unspecified lower leg, initial encounter for closed fracture     Fracture, right ankle     Personal history of other medical treatment (CODE)      4, Para 4-0-0-4     Pure hypercholesterolemia     No Comments Provided     Rectal polyp     Hyperplastic polyp times three noted on colonoscopy 2007     Tobacco use     No Comments Provided     Uncomplicated alcohol dependence (H)     No Comments Provided     Past Surgical History:   Procedure Laterality Date     BRONCHOSCOPY DIAGNOSTIC  2018    MAC infection     CLOSED REDUCTION ANKLE      07,Open reduction/internal fixation, right ankle fracture 07.     COLONOSCOPY      ,screening for family history,  follow-up recommended in five years.     COLONOSCOPY      ,Colonoscopy 2007.  Follow-up recommended in five years.     COLONOSCOPY  2013,Normal, hemorrhoids - follow up 5 years for family history of colon cnacer      HYSTERECTOMY TOTAL ABDOMINAL      04/26/2007,Laparoscopic supracervical hysterectomy and bilateral salpingo-oophorectomy for bleeding fibroid     LAPAROSCOPIC BYPASS GASTRIC      2007     LAPAROSCOPIC TUBAL LIGATION      1985     LOBECTOMY LUNG  08/07/2018    VATS - LOBECTOMY UPPER LUNG, Dr [address]ita [address]     LUMPECTOMY BREAST      Lumpectomy of the right breast     RELEASE CARPAL TUNNEL Left 2/21/2019    Procedure: RELEASE CARPAL TUNNEL;  Surgeon: Andrew Sahni MD;  Location: [facility] OR     RELEASE CARPAL TUNNEL Right 4/4/2019    Procedure: Open Carpal Tunnel Release;  Surgeon: Andrew Sahni MD;  Location: [facility] OR     SKIN GRAFT, EACH ADDN 100SQCM  03/08/2007    Skin grafting post right ankle fracture       Bleeding tendencies:  No    ALLERGIES/SENSITIVITIES: No Known Allergies     CURRENT MEDICATIONS:    Prior to Admission medications    Medication Sig Start Date End Date Taking? Authorizing Provider   albuterol (2.5 MG/3ML) 0.083% neb solution Take 1 vial (2.5 mg) by nebulization 4 times daily 11/20/18  Yes Silvino Peace MD   albuterol (PROAIR HFA) 108 (90 Base) MCG/ACT inhaler Inhale 2 puffs into the lungs 4 times daily as needed 1/16/20  Yes Silvino Peace MD   alendronate (FOSAMAX) 70 MG tablet Take 1 tablet (70 mg) by mouth every 7 days Take in the morning on empty stomach with full glass of water. Do not lie down for 1 hr. 3/6/20  Yes Silvino Peace MD   azelastine (ASTEPRO) 0.15 % nasal spray Spray 2 sprays into both nostrils daily 1/16/20  Yes Silvino Peace MD   diclofenac (VOLTAREN) 1 % topical gel Apply 2 g topically 4 times daily 8/27/20  Yes Yanique Sahni APRN CNP   fluticasone (FLONASE) 50 MCG/ACT nasal spray Spray 1 spray into both nostrils daily 1/16/20  Yes Silvino Peace MD   gabapentin (NEURONTIN) 400 MG capsule Take 1 capsule (400 mg) by mouth daily AND 2 capsules (800 mg) At Bedtime. 3/6/20  Yes Silvino Peace MD   hydrOXYzine (ATARAX) 10 MG tablet Take 1-2 tablets  (10-20 mg) by mouth 3 times daily as needed 1/30/20  Yes Silvino Peace MD   omeprazole (PRILOSEC) 20 MG DR capsule Take 1 capsule (20 mg) by mouth daily 10/2/20  Yes Silvino Peace MD   sodium chloride 3 % NEBU neb solution INHALE 3ML AS DIRECTED 11/21/17  Yes Reported, Patient   traZODone (DESYREL) 150 MG tablet Take 1 tablet (150 mg) by mouth At Bedtime 11/2/20  Yes Silvino Peace MD   venlafaxine (EFFEXOR-XR) 150 MG 24 hr capsule Take 2 capsules (300 mg) by mouth daily 11/2/20  Yes Silvino Peace MD   ferrous sulfate (FEROSUL) 325 (65 Fe) MG tablet Take 325 mg by mouth 7/9/20   Reported, Patient   lisinopril (PRINIVIL/ZESTRIL) 5 MG tablet Take 1 tablet (5 mg) by mouth daily 1/16/20   Silvino Peace MD       Physical Exam:  /74 (Cuff Size: Adult Regular)   Pulse 66   Temp 98.4  F (36.9  C) (Tympanic)   Resp 16   Wt 58.6 kg (129 lb 4 oz)   SpO2 100%   BMI 22.34 kg/m    EXAM:  Chest/Respiratory Exam: Normal - Clear to auscultation without rales, rhonchi, or wheezing.  Cardiovascular Exam: normal, regular rate and rhythm        PLAN: COLONOSCOPY .  Patient understands risks of bleeding, perforation, potential inability to reach cecum, aspiration and wishes to proceed. MAC needed for ASA III.

## 2021-02-01 NOTE — ANESTHESIA PREPROCEDURE EVALUATION
Anesthesia Pre-Procedure Evaluation    Patient: Lisy Telles   MRN: [id number] : 1960        Preoperative Diagnosis: Screening for colon cancer [Z12.11]  Family history of colon cancer [Z80.0]   Procedure : Procedure(s):  COLONOSCOPY     Past Medical History:   Diagnosis Date     Age-related osteoporosis with current pathological fracture with routine healing     2017     Anxiety disorder     No Comments Provided     Appendicitis     No Comments Provided     Benign neoplasm of connective and other soft tissue, unspecified     Right Breast     Bilateral carpal tunnel syndrome      Brachial plexus disorders     2016     Chronic obstructive pulmonary disease (H)     No Comments Provided     Essential (primary) hypertension     No Comments Provided     Major depressive disorder, single episode     Depression with insomnia and tiredness     Other displaced fracture of upper end of left humerus, subsequent encounter for fracture with routine healing     2016     Other fracture of unspecified lower leg, initial encounter for closed fracture     Fracture, right ankle     Personal history of other medical treatment (CODE)      4, Para 4-0-0-4     Pure hypercholesterolemia     No Comments Provided     Rectal polyp     Hyperplastic polyp times three noted on colonoscopy 2007     Tobacco use     No Comments Provided     Uncomplicated alcohol dependence (H)     No Comments Provided      Past Surgical History:   Procedure Laterality Date     BRONCHOSCOPY DIAGNOSTIC  2018    MAC infection     CLOSED REDUCTION ANKLE      07,Open reduction/internal fixation, right ankle fracture 07.     COLONOSCOPY      ,screening for family history,  follow-up recommended in five years.     COLONOSCOPY      ,Colonoscopy 2007.  Follow-up recommended in five years.     COLONOSCOPY  2013,Normal, hemorrhoids - follow up 5 years for family history of colon cnacer      HYSTERECTOMY TOTAL ABDOMINAL      04/26/2007,Laparoscopic supracervical hysterectomy and bilateral salpingo-oophorectomy for bleeding fibroid     LAPAROSCOPIC BYPASS GASTRIC      2007     LAPAROSCOPIC TUBAL LIGATION      1985     LOBECTOMY LUNG  08/07/2018    VATS - LOBECTOMY UPPER LUNG, Dr Edita [address]     LUMPECTOMY BREAST      Lumpectomy of the right breast     RELEASE CARPAL TUNNEL Left 2/21/2019    Procedure: RELEASE CARPAL TUNNEL;  Surgeon: Andrew Sahni MD;  Location: [facility] OR     RELEASE CARPAL TUNNEL Right 4/4/2019    Procedure: Open Carpal Tunnel Release;  Surgeon: Andrew Sahni MD;  Location: [facility] OR     SKIN GRAFT, EACH ADDN 100SQCM  03/08/2007    Skin grafting post right ankle fracture      No Known Allergies   Social History     Tobacco Use     Smoking status: Light Tobacco Smoker     Packs/day: 0.00     Years: 43.00     Pack years: 0.00     Types: Cigarettes     Smokeless tobacco: Never Used     Tobacco comment: 5 per day-has patches   Substance Use Topics     Alcohol use: Not Currently     Alcohol/week: 0.0 standard drinks      Wt Readings from Last 1 Encounters:   01/06/21 58.6 kg (129 lb 4 oz)        Anesthesia Evaluation   Pt has had prior anesthetic.     No history of anesthetic complications       ROS/MED HX  ENT/Pulmonary: Comment: Emphysema, s/p lobe resection    (+) tobacco use, Current use, 1 packs/day, moderate,  COPD,     Neurologic: Comment: Axillary nerve palsy, hx Bilateral CTS, hx TBI      Cardiovascular:     (+) Dyslipidemia hypertension-----CHAMBERLAIN. dysrhythmias, PVCs, valvular problems/murmurs type: MR Moderate.     METS/Exercise Tolerance: 1 - Eating, dressing    Hematologic:  - neg hematologic  ROS     Musculoskeletal:   (+) arthritis,     GI/Hepatic: Comment: Hx liver/spleen laceration      (+) GERD, Asymptomatic on medication, bowel prep,  (-) liver disease   Renal/Genitourinary:  - neg Renal ROS     Endo:  - neg endo ROS     Psychiatric/Substance Use:     (+) psychiatric  history depression and anxiety alcohol abuse     Infectious Disease:  - neg infectious disease ROS     Malignancy:  - neg malignancy ROS     Other:  - neg other ROS          Physical Exam    Airway        Mallampati: I   TM distance: < 3 FB   Neck ROM: full   Mouth opening: < 3 cm    Respiratory Devices and Support         Dental  no notable dental history         Cardiovascular   cardiovascular exam normal          Pulmonary   pulmonary exam normal        breath sounds clear to auscultation           OUTSIDE LABS:  CBC:   Lab Results   Component Value Date    WBC 6.1 10/01/2020    WBC 6.4 08/27/2020    HGB 11.2 (L) 10/01/2020    HGB 12.2 08/27/2020    HCT 34.7 (L) 10/01/2020    HCT 38.4 08/27/2020     10/01/2020     08/27/2020     BMP:   Lab Results   Component Value Date     10/01/2020     09/29/2020    POTASSIUM 4.5 10/01/2020    POTASSIUM 4.4 09/29/2020    CHLORIDE 104 10/01/2020    CHLORIDE 104 09/29/2020    CO2 28 10/01/2020    CO2 30 09/29/2020    BUN 14 10/01/2020    BUN 14 09/29/2020    CR 0.88 10/01/2020    CR 1.02 09/29/2020    GLC 90 10/01/2020    GLC 81 09/29/2020     COAGS:   Lab Results   Component Value Date    INR 0.9 09/11/2017     POC: No results found for: BGM, HCG, HCGS  HEPATIC:   Lab Results   Component Value Date    ALBUMIN 4.1 09/29/2020    PROTTOTAL 6.9 09/29/2020    ALT 21 09/29/2020    AST 26 09/29/2020    ALKPHOS 67 09/29/2020    BILITOTAL 0.4 09/29/2020    KATERYNA 17 09/04/2015     OTHER:   Lab Results   Component Value Date    SYED 8.8 10/01/2020    MAG 1.9 09/29/2020    CRP 1.3 10/02/2020    SED 14 10/02/2020       Anesthesia Plan     History & Physical Review      ASA Status:  3. NPO Status:  NPO Appropriate. .  Plan for MAC Reason for MAC:  Chronic cardiopulmonary disease (G9).            Consents  Anesthesia Plan(s) and associated risks, benefits, and realistic alternatives discussed.    Questions answered and patient/representative(s) expressed  understanding.    Discussed with:  Patient.       Extended Intubation/Ventilatory Support Discussed No No     Use of blood products discussed: No.          Postoperative Care            KELI Smith CRNA

## 2021-02-01 NOTE — OP NOTE
PROCEDURE NOTE    DATE OF SERVICE: 2/1/2021    SURGEON: Marcel Dee MD    PRE-OP DIAGNOSIS:    Family history of colon cancer        POST-OP DIAGNOSIS:  Same  Polyp at 20 cm    PROCEDURE:   Colonoscopy with hot biopsy    ANESTHESIA:  STERLING Hunt CRNA    INDICATION FOR THE PROCEDURE: The patient is a 60 year old female with FH colon cancer . The patient has no other complaints  . After explaining the risks to include bleeding, perforation, potential inability toreach the cecum, the patient wished to proceed.    PROCEDURE:After adequate sedation, the patient was in the left lateral decubitus position.  Rectal exam was performed.  There was normal tone and no palpable masses and multiple anal tags.  The colonoscope was introduced into the rectum and advanced to the cecum with Moderate difficulty.  The patient's prep was good.  The terminal cecum was reached.  The cecum, ascending, transverse, descending and sigmoid colon was with small (0.4 cm ) polyp at 20 cm that was hot biopsied and destroyed .  The scope was retroflexed in the rectum.  The rectum was unremarkable  .  The scope was straightened and removed.  The patient tolerated the procedure well.     ESTIMATED BLOOD LOSS: none    COMPLICATIONS:  None    TISSUE REMOVED:  Yes    RECOMMEND:      Follow-up pending pathology  Or 5 years due to FH      Marcel Dee MD FACS

## 2021-02-04 PROBLEM — K63.5 COLON POLYP: Status: RESOLVED | Noted: 2021-02-01 | Resolved: 2021-02-04

## 2021-02-16 ENCOUNTER — OFFICE VISIT (OUTPATIENT)
Dept: FAMILY MEDICINE | Facility: OTHER | Age: 61
End: 2021-02-16
Attending: FAMILY MEDICINE
Payer: COMMERCIAL

## 2021-02-16 VITALS
BODY MASS INDEX: 22.04 KG/M2 | WEIGHT: 127.5 LBS | OXYGEN SATURATION: 96 % | HEART RATE: 68 BPM | TEMPERATURE: 97.9 F | SYSTOLIC BLOOD PRESSURE: 118 MMHG | DIASTOLIC BLOOD PRESSURE: 68 MMHG | RESPIRATION RATE: 20 BRPM

## 2021-02-16 DIAGNOSIS — N30.01 ACUTE CYSTITIS WITH HEMATURIA: ICD-10-CM

## 2021-02-16 DIAGNOSIS — R30.0 DYSURIA: Primary | ICD-10-CM

## 2021-02-16 LAB
ALBUMIN UR-MCNC: 30 MG/DL
APPEARANCE UR: ABNORMAL
BILIRUB UR QL STRIP: NEGATIVE
COLOR UR AUTO: YELLOW
GLUCOSE UR STRIP-MCNC: NEGATIVE MG/DL
HGB UR QL STRIP: ABNORMAL
KETONES UR STRIP-MCNC: 10 MG/DL
LEUKOCYTE ESTERASE UR QL STRIP: ABNORMAL
MUCOUS THREADS #/AREA URNS LPF: PRESENT /LPF
NITRATE UR QL: NEGATIVE
PH UR STRIP: 6.5 PH (ref 5–7)
RBC #/AREA URNS AUTO: 9 /HPF (ref 0–2)
SOURCE: ABNORMAL
SP GR UR STRIP: 1.03 (ref 1–1.03)
SQUAMOUS #/AREA URNS AUTO: 44 /HPF (ref 0–1)
UROBILINOGEN UR STRIP-MCNC: 4 MG/DL (ref 0–2)
WBC #/AREA URNS AUTO: >182 /HPF (ref 0–5)

## 2021-02-16 PROCEDURE — 87086 URINE CULTURE/COLONY COUNT: CPT | Mod: ZL | Performed by: FAMILY MEDICINE

## 2021-02-16 PROCEDURE — 81001 URINALYSIS AUTO W/SCOPE: CPT | Mod: ZL | Performed by: FAMILY MEDICINE

## 2021-02-16 PROCEDURE — 87088 URINE BACTERIA CULTURE: CPT | Mod: ZL | Performed by: FAMILY MEDICINE

## 2021-02-16 PROCEDURE — 99213 OFFICE O/P EST LOW 20 MIN: CPT | Performed by: FAMILY MEDICINE

## 2021-02-16 RX ORDER — NITROFURANTOIN 25; 75 MG/1; MG/1
100 CAPSULE ORAL 2 TIMES DAILY
Qty: 14 CAPSULE | Refills: 0 | Status: SHIPPED | OUTPATIENT
Start: 2021-02-16 | End: 2021-02-18 | Stop reason: ALTCHOICE

## 2021-02-16 ASSESSMENT — ANXIETY QUESTIONNAIRES
IF YOU CHECKED OFF ANY PROBLEMS ON THIS QUESTIONNAIRE, HOW DIFFICULT HAVE THESE PROBLEMS MADE IT FOR YOU TO DO YOUR WORK, TAKE CARE OF THINGS AT HOME, OR GET ALONG WITH OTHER PEOPLE: NOT DIFFICULT AT ALL
1. FEELING NERVOUS, ANXIOUS, OR ON EDGE: NOT AT ALL
5. BEING SO RESTLESS THAT IT IS HARD TO SIT STILL: NOT AT ALL
7. FEELING AFRAID AS IF SOMETHING AWFUL MIGHT HAPPEN: NOT AT ALL
2. NOT BEING ABLE TO STOP OR CONTROL WORRYING: NOT AT ALL
3. WORRYING TOO MUCH ABOUT DIFFERENT THINGS: NOT AT ALL
6. BECOMING EASILY ANNOYED OR IRRITABLE: NOT AT ALL
GAD7 TOTAL SCORE: 0

## 2021-02-16 ASSESSMENT — PAIN SCALES - GENERAL: PAINLEVEL: MILD PAIN (3)

## 2021-02-16 ASSESSMENT — PATIENT HEALTH QUESTIONNAIRE - PHQ9
5. POOR APPETITE OR OVEREATING: NOT AT ALL
SUM OF ALL RESPONSES TO PHQ QUESTIONS 1-9: 0

## 2021-02-16 NOTE — NURSING NOTE
"Patient presents to the clinic for the following urinary concerns since 0400 today: burning, pelvic pain and blood in the urine.      Urine analysis initiated per verbal orderthat was read back and verified.        Chief Complaint   Patient presents with     Urinary Problem       Initial /68 (BP Location: Right arm, Patient Position: Sitting, Cuff Size: Adult Regular)   Pulse 68   Temp 97.9  F (36.6  C) (Temporal)   Resp 20   Wt 57.8 kg (127 lb 8 oz)   SpO2 96%   BMI 22.04 kg/m   Estimated body mass index is 22.04 kg/m  as calculated from the following:    Height as of 12/10/20: 1.62 m (5' 3.78\").    Weight as of this encounter: 57.8 kg (127 lb 8 oz).  Medication Reconciliation: complete    Laura Gee LPN    "

## 2021-02-16 NOTE — PROGRESS NOTES
"Nursing Notes:   Laura Gee LPN  2/16/2021 10:55 AM  Signed  Patient presents to the clinic for the following urinary concerns since 0400 today: burning, pelvic pain and blood in the urine.      Urine analysis initiated per verbal orderthat was read back and verified.        Chief Complaint   Patient presents with     Urinary Problem       Initial /68 (BP Location: Right arm, Patient Position: Sitting, Cuff Size: Adult Regular)   Pulse 68   Temp 97.9  F (36.6  C) (Temporal)   Resp 20   Wt 57.8 kg (127 lb 8 oz)   SpO2 96%   BMI 22.04 kg/m   Estimated body mass index is 22.04 kg/m  as calculated from the following:    Height as of 12/10/20: 1.62 m (5' 3.78\").    Weight as of this encounter: 57.8 kg (127 lb 8 oz).  Medication Reconciliation: complete    Laura Gee LPN         Assessment & Plan       ICD-10-CM    1. Dysuria  R30.0 UA reflex to Microscopic   2. Acute cystitis with hematuria  N30.01 nitroFURantoin macrocrystal-monohydrate (MACROBID) 100 MG capsule     Urine Culture Aerobic Bacterial     UA with large leukocyte esterase, negative nitrite.  She has lots of white cells but no bacteria.  Some red cells.  Certainly kidney stone could explain these findings, but based on symptoms UTI is possible.  Elected to start nitrofurantoin twice daily for 7 days.  Urine sent for culture to confirm if infection present.  We will let her know if resistance is shown.  Aware that Macrobid will not cover Martin and so definitely needs to return for fever, worsening of symptoms, or flank pain.      Tobacco Cessation:   reports that she has been smoking cigarettes. She has been smoking about 0.00 packs per day for the past 43.00 years. She has never used smokeless tobacco.  Tobacco Cessation Action Plan: has patches when ready to quit      Follow up as needed     Silvino Peace MD     [facility]      SUBJECTIVE:  60 year old female presents for dysuria starting last night, severe at " 4 am. Hematuria noted. Not much urgency. No fever or flank pain.  She is feeling better now and is wondering if she potentially passed a kidney stone.  No history of kidney stones though.    Was treated in December 2020 for UTI with culture growing E. coli.      REVIEW OF SYSTEMS:    Pertinent items are noted in HPI.    Current Outpatient Medications   Medication Sig Dispense Refill     albuterol (2.5 MG/3ML) 0.083% neb solution Take 1 vial (2.5 mg) by nebulization 4 times daily 360 mL 3     albuterol (PROAIR HFA) 108 (90 Base) MCG/ACT inhaler Inhale 2 puffs into the lungs 4 times daily as needed 3 Inhaler 0     alendronate (FOSAMAX) 70 MG tablet Take 1 tablet (70 mg) by mouth every 7 days Take in the morning on empty stomach with full glass of water. Do not lie down for 1 hr. 13 tablet 3     azelastine (ASTEPRO) 0.15 % nasal spray Spray 2 sprays into both nostrils daily 30 mL 11     diclofenac (VOLTAREN) 1 % topical gel Apply 2 g topically 4 times daily 150 g 1     fluticasone (FLONASE) 50 MCG/ACT nasal spray Spray 1 spray into both nostrils daily 18 g 11     gabapentin (NEURONTIN) 400 MG capsule Take 1 capsule (400 mg) by mouth daily AND 2 capsules (800 mg) At Bedtime. 270 capsule 3     hydrOXYzine (ATARAX) 10 MG tablet Take 1-2 tablets (10-20 mg) by mouth 3 times daily as needed 60 tablet 3     sodium chloride 3 % NEBU neb solution INHALE 3ML AS DIRECTED       traZODone (DESYREL) 150 MG tablet Take 1 tablet (150 mg) by mouth At Bedtime 90 tablet 3     venlafaxine (EFFEXOR-XR) 150 MG 24 hr capsule Take 2 capsules (300 mg) by mouth daily 180 capsule 3     No Known Allergies    OBJECTIVE:  /68 (BP Location: Right arm, Patient Position: Sitting, Cuff Size: Adult Regular)   Pulse 68   Temp 97.9  F (36.6  C) (Temporal)   Resp 20   Wt 57.8 kg (127 lb 8 oz)   SpO2 96%   BMI 22.04 kg/m      EXAM:  General Appearance: Alert. No acute distress  Psychiatric: Normal affect and mentation      Results for orders  placed or performed in visit on 02/16/21   UA reflex to Microscopic     Status: Abnormal   Result Value Ref Range    Color Urine Yellow     Appearance Urine Slightly Cloudy     Glucose Urine Negative NEG^Negative mg/dL    Bilirubin Urine Negative NEG^Negative    Ketones Urine 10 (A) NEG^Negative mg/dL    Specific Gravity Urine 1.032 1.003 - 1.035    Blood Urine Small (A) NEG^Negative    pH Urine 6.5 5.0 - 7.0 pH    Protein Albumin Urine 30 (A) NEG^Negative mg/dL    Urobilinogen mg/dL 4.0 (H) 0.0 - 2.0 mg/dL    Nitrite Urine Negative NEG^Negative    Leukocyte Esterase Urine Large (A) NEG^Negative    Source Midstream Urine     RBC Urine 9 (H) 0 - 2 /HPF    WBC Urine >182 (H) 0 - 5 /HPF    Squamous Epithelial /HPF Urine 44 (H) 0 - 1 /HPF    Mucous Urine Present (A) NEG^Negative /LPF          This document was prepared using a combination of typing and voice generated software.  While every attempt was made for accuracy, spelling and grammatical errors may exist.

## 2021-02-17 ASSESSMENT — ANXIETY QUESTIONNAIRES: GAD7 TOTAL SCORE: 0

## 2021-02-18 ENCOUNTER — TELEPHONE (OUTPATIENT)
Dept: FAMILY MEDICINE | Facility: OTHER | Age: 61
End: 2021-02-18

## 2021-02-18 DIAGNOSIS — N30.01 ACUTE CYSTITIS WITH HEMATURIA: Primary | ICD-10-CM

## 2021-02-18 LAB
BACTERIA SPEC CULT: ABNORMAL
SPECIMEN SOURCE: ABNORMAL

## 2021-02-18 RX ORDER — CIPROFLOXACIN 500 MG/1
500 TABLET, FILM COATED ORAL 2 TIMES DAILY
Qty: 14 TABLET | Refills: 0 | Status: SHIPPED | OUTPATIENT
Start: 2021-02-18 | End: 2022-01-25

## 2021-02-18 NOTE — TELEPHONE ENCOUNTER
Urine culture shows no sensitivity to Macrobid. New prescription for Cipro sent in. Patient will be notified. Follow up as needed.     Kiersten Choi PA-C on 2/18/2021 at 11:04 AM

## 2021-04-02 ENCOUNTER — IMMUNIZATION (OUTPATIENT)
Dept: FAMILY MEDICINE | Facility: OTHER | Age: 61
End: 2021-04-02
Attending: FAMILY MEDICINE
Payer: COMMERCIAL

## 2021-04-02 PROCEDURE — 91300 PR COVID VAC PFIZER DIL RECON 30 MCG/0.3 ML IM: CPT

## 2021-04-02 PROCEDURE — 0001A PR COVID VAC PFIZER DIL RECON 30 MCG/0.3 ML IM: CPT

## 2021-04-23 ENCOUNTER — IMMUNIZATION (OUTPATIENT)
Dept: FAMILY MEDICINE | Facility: OTHER | Age: 61
End: 2021-04-23
Attending: FAMILY MEDICINE
Payer: COMMERCIAL

## 2021-04-23 PROCEDURE — 0002A PR COVID VAC PFIZER DIL RECON 30 MCG/0.3 ML IM: CPT

## 2021-04-23 PROCEDURE — 91300 PR COVID VAC PFIZER DIL RECON 30 MCG/0.3 ML IM: CPT

## 2021-04-25 ENCOUNTER — HEALTH MAINTENANCE LETTER (OUTPATIENT)
Age: 61
End: 2021-04-25

## 2021-05-21 DIAGNOSIS — F51.01 PRIMARY INSOMNIA: ICD-10-CM

## 2021-05-21 RX ORDER — TRAZODONE HYDROCHLORIDE 150 MG/1
TABLET ORAL
Qty: 90 TABLET | Refills: 3 | OUTPATIENT
Start: 2021-05-21

## 2021-05-21 NOTE — TELEPHONE ENCOUNTER
Redundant refill request refused: Too soon:    traZODone (DESYREL) 150 MG tablet 90 tablet 3 11/2/2020  No   Sig - Route: Take 1 tablet (150 mg) by mouth At Bedtime - Oral   Sent to pharmacy as: traZODone HCl 150 MG Oral Tablet (DESYREL)   Class: E-Prescribe   Order: [id number]   E-Prescribing Status: Receipt confirmed by pharmacy (11/2/2020  2:53 PM CST)     [id number] - [place], [place] - [address]     Unable to complete prescription refill per RN Medication Refill Policy. Qi Parekh RN .............. 5/21/2021  10:41 AM

## 2021-05-25 DIAGNOSIS — J44.89 COPD WITH CHRONIC BRONCHITIS AND EMPHYSEMA (H): ICD-10-CM

## 2021-05-25 DIAGNOSIS — J43.9 COPD WITH CHRONIC BRONCHITIS AND EMPHYSEMA (H): ICD-10-CM

## 2021-05-25 NOTE — TELEPHONE ENCOUNTER
[facility] sent Rx request for the following:   albuterol (PROAIR HFA/PROVENTIL HFA/VENTOLIN HFA) 108 (90 Base) MCG/ACT inhaler  Sig: INHALE 2 PUFFS INTO THE LUNGS 4 TIMES DAILY AS NEEDED    Last Prescription Date:   01/16/2020  Last Fill Qty/Refills:         3 inhaler, R-0    Last Office Visit:              02/16/2021 ([place])   Future Office visit:           None noted   Asthma Maintenance Inhalers - Anticholinergics Passed - 5/25/2021 10:30 AM   Needing revision in qty, no longer prescribed in 'inhaler' form. Routing for PCP review. Unable to complete prescription refill per RN Medication Refill Policy.................... Savita Hernandez RN ....................  5/25/2021   4:49 PM

## 2021-05-26 RX ORDER — ALBUTEROL SULFATE 90 UG/1
2 AEROSOL, METERED RESPIRATORY (INHALATION) 4 TIMES DAILY PRN
Qty: 8.5 G | Refills: 11 | Status: SHIPPED | OUTPATIENT
Start: 2021-05-26 | End: 2022-09-21

## 2021-06-10 ENCOUNTER — HOSPITAL ENCOUNTER (OUTPATIENT)
Dept: CARDIOLOGY | Facility: OTHER | Age: 61
Discharge: HOME | End: 2021-06-10
Attending: INTERNAL MEDICINE | Admitting: INTERNAL MEDICINE
Payer: COMMERCIAL

## 2021-06-10 DIAGNOSIS — I51.7 BILATERAL ENLARGEMENT OF ATRIA: Primary | ICD-10-CM

## 2021-06-10 DIAGNOSIS — I34.0 MODERATE MITRAL REGURGITATION: ICD-10-CM

## 2021-06-10 DIAGNOSIS — I10 ESSENTIAL HYPERTENSION: ICD-10-CM

## 2021-06-10 PROCEDURE — 93306 TTE W/DOPPLER COMPLETE: CPT

## 2021-06-10 PROCEDURE — 93306 TTE W/DOPPLER COMPLETE: CPT | Mod: 26 | Performed by: INTERNAL MEDICINE

## 2021-07-16 DIAGNOSIS — I10 ESSENTIAL HYPERTENSION: ICD-10-CM

## 2021-07-16 RX ORDER — LISINOPRIL 5 MG/1
TABLET ORAL
Qty: 90 TABLET | Refills: 4 | OUTPATIENT
Start: 2021-07-16

## 2021-07-16 NOTE — TELEPHONE ENCOUNTER
This Order Has Been Discontinued    Order Status Reason By On   Discontinued Stopped by Patient CHECOLaura Jefferson, LPN 2/16/21 1040     Unable to complete prescription refill per RN Medication Refill Policy.................... Savita Hernandez RN ....................  7/16/2021   11:17 AM

## 2021-07-26 DIAGNOSIS — I10 ESSENTIAL HYPERTENSION: ICD-10-CM

## 2021-07-26 DIAGNOSIS — G25.81 RESTLESS LEGS SYNDROME (RLS): ICD-10-CM

## 2021-07-27 RX ORDER — LISINOPRIL 5 MG/1
TABLET ORAL
Qty: 90 TABLET | Refills: 4 | Status: SHIPPED | OUTPATIENT
Start: 2021-07-27 | End: 2022-07-03

## 2021-07-27 RX ORDER — GABAPENTIN 400 MG/1
CAPSULE ORAL
Qty: 270 CAPSULE | Refills: 3 | Status: SHIPPED | OUTPATIENT
Start: 2021-07-27 | End: 2022-10-31

## 2021-07-27 NOTE — TELEPHONE ENCOUNTER
[facility] sent Rx request for the following:      GABAPENTIN 400MG CAPSULES  Sig: TAKE 1 CAPSULE BY MOUTH DAILY AND 2 CAPSULES AT BEDTIME      Last Prescription Date:   3/6/2020  Last Fill Qty/Refills:         270, R-3    Last Office Visit:              2/16/2021   Future Office visit:           none       LISINOPRIL 5MG TABLETS  Sig: TAKE 1 TABLET(5MG) BY MOUTH A DAY      Last Prescription Date:   1/16/2020   Last Fill Qty/Refills:         90, R-4        Routing refill request to provider for review/approval because:  Drug not on the [facility] or [facility] refill protocol or controlled substance  Drug not active on patient's medication list      Unable to complete prescription refill per RN Medication Refill Policy.................... Kofi Bear RN ....................  7/27/2021   9:30 AM

## 2021-10-09 ENCOUNTER — HEALTH MAINTENANCE LETTER (OUTPATIENT)
Age: 61
End: 2021-10-09

## 2022-01-03 ENCOUNTER — OFFICE VISIT (OUTPATIENT)
Dept: FAMILY MEDICINE | Facility: OTHER | Age: 62
End: 2022-01-03
Attending: STUDENT IN AN ORGANIZED HEALTH CARE EDUCATION/TRAINING PROGRAM
Payer: COMMERCIAL

## 2022-01-03 VITALS
OXYGEN SATURATION: 100 % | DIASTOLIC BLOOD PRESSURE: 86 MMHG | HEART RATE: 80 BPM | WEIGHT: 127.8 LBS | SYSTOLIC BLOOD PRESSURE: 128 MMHG | TEMPERATURE: 97.3 F | RESPIRATION RATE: 20 BRPM | BODY MASS INDEX: 21.82 KG/M2 | HEIGHT: 64 IN

## 2022-01-03 DIAGNOSIS — J43.8 OTHER EMPHYSEMA (H): Primary | ICD-10-CM

## 2022-01-03 PROCEDURE — 99214 OFFICE O/P EST MOD 30 MIN: CPT | Performed by: STUDENT IN AN ORGANIZED HEALTH CARE EDUCATION/TRAINING PROGRAM

## 2022-01-03 ASSESSMENT — PATIENT HEALTH QUESTIONNAIRE - PHQ9
SUM OF ALL RESPONSES TO PHQ QUESTIONS 1-9: 6
10. IF YOU CHECKED OFF ANY PROBLEMS, HOW DIFFICULT HAVE THESE PROBLEMS MADE IT FOR YOU TO DO YOUR WORK, TAKE CARE OF THINGS AT HOME, OR GET ALONG WITH OTHER PEOPLE: NOT DIFFICULT AT ALL
SUM OF ALL RESPONSES TO PHQ QUESTIONS 1-9: 6

## 2022-01-03 ASSESSMENT — MIFFLIN-ST. JEOR: SCORE: 1130.73

## 2022-01-03 ASSESSMENT — ANXIETY QUESTIONNAIRES
7. FEELING AFRAID AS IF SOMETHING AWFUL MIGHT HAPPEN: NOT AT ALL
GAD7 TOTAL SCORE: 5
3. WORRYING TOO MUCH ABOUT DIFFERENT THINGS: SEVERAL DAYS
5. BEING SO RESTLESS THAT IT IS HARD TO SIT STILL: NOT AT ALL
6. BECOMING EASILY ANNOYED OR IRRITABLE: SEVERAL DAYS
4. TROUBLE RELAXING: NOT AT ALL
2. NOT BEING ABLE TO STOP OR CONTROL WORRYING: NOT AT ALL
1. FEELING NERVOUS, ANXIOUS, OR ON EDGE: NEARLY EVERY DAY
7. FEELING AFRAID AS IF SOMETHING AWFUL MIGHT HAPPEN: NOT AT ALL
GAD7 TOTAL SCORE: 5
GAD7 TOTAL SCORE: 5

## 2022-01-03 ASSESSMENT — PAIN SCALES - GENERAL: PAINLEVEL: MILD PAIN (2)

## 2022-01-03 NOTE — NURSING NOTE
"Chief Complaint   Patient presents with     COPD     Issues with COPD       Initial /86 (BP Location: Right arm, Patient Position: Chair, Cuff Size: Adult Regular)   Pulse 80   Temp 97.3  F (36.3  C) (Tympanic)   Resp 20   Ht 1.619 m (5' 3.75\")   Wt 58 kg (127 lb 12.8 oz)   SpO2 100%   Breastfeeding No   BMI 22.11 kg/m   Estimated body mass index is 22.11 kg/m  as calculated from the following:    Height as of this encounter: 1.619 m (5' 3.75\").    Weight as of this encounter: 58 kg (127 lb 12.8 oz).  Medication Reconciliation: complete      FOOD SECURITY SCREENING QUESTIONS:    The next two questions are to help us understand your food security.  If you are feeling you need any assistance in this area, we have resources available to support you today.    Hunger Vital Signs:  Within the past 12 months we worried whether our food would run out before we got money to buy more. Kerri Gonzalez LPN on 1/3/2022 at 11:15 AM   "

## 2022-01-03 NOTE — PROGRESS NOTES
"  Assessment & Plan     COPD  Has COPD, not currently on any medication. Last PFTs in Sept 2017, showed obstruction pattern. Albuterol does not help so not taking it. No hospitalizations in the last year. No recorded exacerbations though she describes them occurring every few months. CAT score <10, without hospitalizations but has no improvement with a SOLE so will try a LAMA, Rx Spiriva. Follow up in 2-3 weeks with PCP. Consider repeat PFTs if no improvement in symptoms. Encouraged to continue working towards smoking cessation  - tiotropium (SPIRIVA RESPIMAT) 2.5 MCG/ACT inhaler; Inhale 2 puffs into the lungs daily    Marine Murry MD  [facility]    Subjective   Lisy is a 61 year old who presents for the following health issues     HPI     COPD   - feels like breathing is worse  - shortness of breath, hard to catch it or do anything   - has albuterol at home and it does not help SOB. Has tried albuterol inhaler and neb without relief  - no hospitalizations in the last year  - no exacerbations in the last year that she has been seen for but she does think she has had them that she should have come in for. Every few months has had 3-5 days at a time where she cannot catch her breath/hard to breathe.   - had a med in the past after part of her lung was removed. Then insurance did not cover so she stopped. That helped her COPD. Unsure name of it   - cut down from smoking 2 ppd to 8 cigarettes/day. Still working on totally quitting.   - last PFTs Sept 2017           Review of Systems   Constitutional, HEENT, cardiovascular, pulmonary, gi and gu systems are negative, except as otherwise noted.      Objective    /86 (BP Location: Right arm, Patient Position: Chair, Cuff Size: Adult Regular)   Pulse 80   Temp 97.3  F (36.3  C) (Tympanic)   Resp 20   Ht 1.619 m (5' 3.75\")   Wt 58 kg (127 lb 12.8 oz)   SpO2 100%   Breastfeeding No   BMI 22.11 kg/m    Body mass index is 22.11 " kg/m .  Physical Exam   GENERAL: healthy, alert and no distress  RESP: decreased breath sounds throughout, no wheezing or crackles.   CV: regular rate and rhythm, normal S1 S2, no S3 or S4, no murmur, click or rub, no peripheral edema and peripheral pulses strong  MS: no gross musculoskeletal defects noted, no edema  PSYCH: mentation appears normal, affect normal/bright                Answers for HPI/ROS submitted by the patient on 1/3/2022  If you checked off any problems, how difficult have these problems made it for you to do your work, take care of things at home, or get along with other people?: Not difficult at all  PHQ9 TOTAL SCORE: 6  NOA 7 TOTAL SCORE: 5

## 2022-01-04 ASSESSMENT — ANXIETY QUESTIONNAIRES: GAD7 TOTAL SCORE: 5

## 2022-01-04 ASSESSMENT — PATIENT HEALTH QUESTIONNAIRE - PHQ9: SUM OF ALL RESPONSES TO PHQ QUESTIONS 1-9: 6

## 2022-01-08 DIAGNOSIS — F51.01 PRIMARY INSOMNIA: ICD-10-CM

## 2022-01-11 RX ORDER — TRAZODONE HYDROCHLORIDE 150 MG/1
TABLET ORAL
Qty: 90 TABLET | Refills: 3 | Status: SHIPPED | OUTPATIENT
Start: 2022-01-11

## 2022-01-11 NOTE — TELEPHONE ENCOUNTER
"[id number] sent Rx request for the following:      Requested Prescriptions   Pending Prescriptions Disp Refills     traZODone (DESYREL) 150 MG tablet [Pharmacy Med Name: TRAZODONE 150MG (HUNDRED-FIFTY) TAB] 90 tablet 3     Sig: TAKE 1 TABLET(150 MG) BY MOUTH AT BEDTIME       Serotonin Modulators Passed - 1/8/2022  4:12 AM        Passed - Recent (12 mo) or future (30 days) visit within the authorizing provider's specialty     Patient has had an office visit with the authorizing provider or a provider within the authorizing providers department within the previous 12 mos or has a future within next 30 days. See \"Patient Info\" tab in inbasket, or \"Choose Columns\" in Meds & Orders section of the refill encounter.          Passed - Medication is active on med list        Passed - Patient is age 18 or older        Passed - No active pregnancy on record        Passed - No positive pregnancy test in past 12 months     Last Prescription Date:  11/2/20  Last Fill Qty/Refills:         90, R-3  Last Office Visit:              1/3/22   Future Office visit:             Next 5 appointments (look out 90 days)    Jan 25, 2022  9:40 AM  SHORT with Silvino Peace MD  [facility] ([facility] ) [address]  [place] [id number] [phone]        Routing refill request to provider for review/approval because:  Unable to complete prescription refill per RN Medication Refill Policy. Cristina Charles RN .............. 1/11/2022  10:05 AM  "

## 2022-01-25 ENCOUNTER — OFFICE VISIT (OUTPATIENT)
Dept: FAMILY MEDICINE | Facility: OTHER | Age: 62
End: 2022-01-25
Attending: FAMILY MEDICINE
Payer: COMMERCIAL

## 2022-01-25 VITALS
TEMPERATURE: 97.3 F | HEART RATE: 84 BPM | DIASTOLIC BLOOD PRESSURE: 76 MMHG | SYSTOLIC BLOOD PRESSURE: 118 MMHG | OXYGEN SATURATION: 98 % | RESPIRATION RATE: 20 BRPM | BODY MASS INDEX: 22.14 KG/M2 | WEIGHT: 128 LBS

## 2022-01-25 DIAGNOSIS — J43.9 COPD WITH CHRONIC BRONCHITIS AND EMPHYSEMA (H): Primary | ICD-10-CM

## 2022-01-25 DIAGNOSIS — M81.0 AGE-RELATED OSTEOPOROSIS WITHOUT CURRENT PATHOLOGICAL FRACTURE: ICD-10-CM

## 2022-01-25 DIAGNOSIS — F33.41 RECURRENT MAJOR DEPRESSION IN PARTIAL REMISSION (H): ICD-10-CM

## 2022-01-25 DIAGNOSIS — J30.0 VASOMOTOR RHINITIS: ICD-10-CM

## 2022-01-25 DIAGNOSIS — J44.89 COPD WITH CHRONIC BRONCHITIS AND EMPHYSEMA (H): Primary | ICD-10-CM

## 2022-01-25 DIAGNOSIS — Z87.891 PERSONAL HISTORY OF TOBACCO USE: ICD-10-CM

## 2022-01-25 DIAGNOSIS — F41.9 ANXIETY: ICD-10-CM

## 2022-01-25 DIAGNOSIS — Z23 NEED FOR PROPHYLACTIC VACCINATION AND INOCULATION AGAINST INFLUENZA: ICD-10-CM

## 2022-01-25 PROBLEM — A31.0: Status: ACTIVE | Noted: 2017-11-30

## 2022-01-25 PROBLEM — J96.01 ACUTE RESPIRATORY FAILURE WITH HYPOXIA AND HYPERCAPNIA (H): Status: ACTIVE | Noted: 2020-07-06

## 2022-01-25 PROBLEM — T50.902A DRUG OVERDOSE, INTENTIONAL SELF-HARM, INITIAL ENCOUNTER (H): Status: ACTIVE | Noted: 2020-07-06

## 2022-01-25 PROBLEM — Z99.11 ON MECHANICALLY ASSISTED VENTILATION (H): Status: ACTIVE | Noted: 2020-07-06

## 2022-01-25 PROBLEM — F17.200 NICOTINE DEPENDENCE: Status: ACTIVE | Noted: 2018-07-03

## 2022-01-25 PROBLEM — J96.02 ACUTE RESPIRATORY FAILURE WITH HYPOXIA AND HYPERCAPNIA (H): Status: ACTIVE | Noted: 2020-07-06

## 2022-01-25 PROCEDURE — 90471 IMMUNIZATION ADMIN: CPT | Performed by: FAMILY MEDICINE

## 2022-01-25 PROCEDURE — 90682 RIV4 VACC RECOMBINANT DNA IM: CPT | Performed by: FAMILY MEDICINE

## 2022-01-25 PROCEDURE — 99214 OFFICE O/P EST MOD 30 MIN: CPT | Mod: 25 | Performed by: FAMILY MEDICINE

## 2022-01-25 RX ORDER — ALBUTEROL SULFATE 0.83 MG/ML
2.5 SOLUTION RESPIRATORY (INHALATION) 4 TIMES DAILY
Qty: 360 ML | Refills: 3 | Status: SHIPPED | OUTPATIENT
Start: 2022-01-25

## 2022-01-25 RX ORDER — SODIUM CHLORIDE FOR INHALATION 3 %
3 VIAL, NEBULIZER (ML) INHALATION 3 TIMES DAILY PRN
Qty: 45 ML | Refills: 3 | Status: SHIPPED | OUTPATIENT
Start: 2022-01-25

## 2022-01-25 RX ORDER — ALENDRONATE SODIUM 70 MG/1
70 TABLET ORAL
Qty: 13 TABLET | Refills: 3 | Status: SHIPPED | OUTPATIENT
Start: 2022-01-25

## 2022-01-25 RX ORDER — VENLAFAXINE HYDROCHLORIDE 150 MG/1
300 CAPSULE, EXTENDED RELEASE ORAL DAILY
Qty: 180 CAPSULE | Refills: 3 | Status: SHIPPED | OUTPATIENT
Start: 2022-01-25 | End: 2022-07-03

## 2022-01-25 RX ORDER — HYDROXYZINE HYDROCHLORIDE 10 MG/1
10-20 TABLET, FILM COATED ORAL 3 TIMES DAILY PRN
Qty: 60 TABLET | Refills: 5 | Status: SHIPPED | OUTPATIENT
Start: 2022-01-25

## 2022-01-25 RX ORDER — CHOLECALCIFEROL (VITAMIN D3) 50 MCG
1 TABLET ORAL DAILY
COMMUNITY
Start: 2022-01-25

## 2022-01-25 ASSESSMENT — ANXIETY QUESTIONNAIRES
6. BECOMING EASILY ANNOYED OR IRRITABLE: NOT AT ALL
GAD7 TOTAL SCORE: 0
1. FEELING NERVOUS, ANXIOUS, OR ON EDGE: NOT AT ALL
GAD7 TOTAL SCORE: 0
7. FEELING AFRAID AS IF SOMETHING AWFUL MIGHT HAPPEN: NOT AT ALL
5. BEING SO RESTLESS THAT IT IS HARD TO SIT STILL: NOT AT ALL
7. FEELING AFRAID AS IF SOMETHING AWFUL MIGHT HAPPEN: NOT AT ALL
4. TROUBLE RELAXING: NOT AT ALL
2. NOT BEING ABLE TO STOP OR CONTROL WORRYING: NOT AT ALL
3. WORRYING TOO MUCH ABOUT DIFFERENT THINGS: NOT AT ALL
GAD7 TOTAL SCORE: 0

## 2022-01-25 ASSESSMENT — PATIENT HEALTH QUESTIONNAIRE - PHQ9
SUM OF ALL RESPONSES TO PHQ QUESTIONS 1-9: 0
10. IF YOU CHECKED OFF ANY PROBLEMS, HOW DIFFICULT HAVE THESE PROBLEMS MADE IT FOR YOU TO DO YOUR WORK, TAKE CARE OF THINGS AT HOME, OR GET ALONG WITH OTHER PEOPLE: NOT DIFFICULT AT ALL
SUM OF ALL RESPONSES TO PHQ QUESTIONS 1-9: 0

## 2022-01-25 ASSESSMENT — PAIN SCALES - GENERAL: PAINLEVEL: NO PAIN (0)

## 2022-01-25 NOTE — NURSING NOTE
"Patient presents to the clinic for COPD.     FOOD SECURITY SCREENING QUESTIONS:    The next two questions are to help us understand your food security.  If you are feeling you need any assistance in this area, we have resources available to support you today.    Hunger Vital Signs:  Within the past 12 months we worried whether our food would run out before we got money to buy more. Never  Within the past 12 months the food we bought just didn't last and we didn't have money to get more. Never    Advance Care Directive on file? no  Advance Care Directive provided to patient? Declined-reports having documents at home.  Chief Complaint   Patient presents with     Breathing Problem       Initial /76 (BP Location: Right arm, Patient Position: Sitting, Cuff Size: Adult Large)   Pulse 84   Temp 97.3  F (36.3  C) (Tympanic)   Resp 20   Wt 58.1 kg (128 lb)   SpO2 98%   Breastfeeding No   BMI 22.14 kg/m   Estimated body mass index is 22.14 kg/m  as calculated from the following:    Height as of 1/3/22: 1.619 m (5' 3.75\").    Weight as of this encounter: 58.1 kg (128 lb).  Medication Reconciliation: complete        Laura Gee LPN       "

## 2022-01-25 NOTE — PROGRESS NOTES
Assessment & Plan       ICD-10-CM    1. 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)  J44.9 albuterol (PROVENTIL) (2.5 MG/3ML) 0.083% neb solution     sodium chloride (NEBUSAL) 3 % neb solution   2. Anxiety  F41.9 hydrOXYzine (ATARAX) 10 MG tablet   3. Vasomotor rhinitis  J30.0    4. Recurrent major depression in partial remission (H)  F33.41 venlafaxine (EFFEXOR-XR) 150 MG 24 hr capsule   5. Age-related osteoporosis without current pathological fracture  M81.0 alendronate (FOSAMAX) 70 MG tablet     DX Hip/Pelvis/Spine     Magnesium     Basic Metabolic Panel     Vitamin D Total     vitamin D3 (CHOLECALCIFEROL) 50 mcg (2000 units) tablet   6. Need for prophylactic vaccination and inoculation against influenza  Z23 Prof fee: Shared Decisionmaking for Lung Cancer Screening     CT Chest Lung Cancer Scrn Low Dose wo   7. Personal history of tobacco use  Z87.891        COPD improved on Spiriva.  Continue use.  Refilled albuterol to use as needed.  Refilled saline neb to use sporadically as needed.  This is not a good consistent treatment for COPD, but reasonable to use for increased congestion.    Refilled venlafaxine and hydroxyzine which she takes as needed for panic attacks.    Reviewed significantly low T score and elevated fracture risk.  Recommend resuming alendronate.  In order to have an accurate baseline for comparison, repeat DEXA scan at this time.    Plan to obtain labs for medication monitoring including BMP, magnesium, vitamin D for osteoporosis.  She left without obtaining labs.  Can obtain in the future as a lab only appointment.    Received flu vaccine today.    Tobacco Cessation:   reports that she has been smoking cigarettes. She has a 48.40 pack-year smoking history. She has never used smokeless tobacco.  Tobacco Cessation Action Plan: Pharmacotherapies : Nicotine patch and other Nicotine replacement    Would like updated annual CT lung cancer screen.  She qualifies based on an approximately  "44-year pack smoking history.  Ordered CT scan.    Follow up by 1 year    Silvino Peace MD     [facility]      Nursing Notes:   Laura Gee LPN  1/25/2022  9:57 AM  Sign at exiting of workspace  Patient presents to the clinic for COPD.     FOOD SECURITY SCREENING QUESTIONS:    The next two questions are to help us understand your food security.  If you are feeling you need any assistance in this area, we have resources available to support you today.    Hunger Vital Signs:  Within the past 12 months we worried whether our food would run out before we got money to buy more. Never  Within the past 12 months the food we bought just didn't last and we didn't have money to get more. Never    Advance Care Directive on file? no  Advance Care Directive provided to patient? Declined-reports having documents at home.  Chief Complaint   Patient presents with     Breathing Problem       Initial /76 (BP Location: Right arm, Patient Position: Sitting, Cuff Size: Adult Large)   Pulse 84   Temp 97.3  F (36.3  C) (Tympanic)   Resp 20   Wt 58.1 kg (128 lb)   SpO2 98%   Breastfeeding No   BMI 22.14 kg/m   Estimated body mass index is 22.14 kg/m  as calculated from the following:    Height as of 1/3/22: 1.619 m (5' 3.75\").    Weight as of this encounter: 58.1 kg (128 lb).  Medication Reconciliation: complete        Laura Gee LPN              SUBJECTIVE:  Imm/Inj    History of Present Illness       COPD:  She presents for follow up of COPD.  Overall, COPD symptoms are better since last visit. She has same as usual fatigue or shortness of breath with exertion and same as usual shortness of breath at rest.  She sometimes coughs and does not have change in sputum. No recent fever. She can walk less than a mile without stopping to rest. She can walk 2 flights of stairs without resting.The patient has had no ED, urgent care, or hospital admissions because of COPD since the last visit.     She " eats 4 or more servings of fruits and vegetables daily.She consumes 0 sweetened beverage(s) daily.She exercises with enough effort to increase her heart rate 9 or less minutes per day.  She exercises with enough effort to increase her heart rate 3 or less days per week.   She is taking medications regularly.      61 year old female presents for follow up on COPD    Started on Spiriva in January with Dr Germaine Murry. It is helping. Still using albuterol inhaler daily. Nebs twice weekly when feeling worse.  Saline nebs were prescribed by mail when she was treated for MAC infection.  She will use this at times when she has more congestion.    Planning to quit smoking eventually. Has patches and gum at home.    Effexor working well for anxiety. Using hydroxyzine two to three times weekly for a panic attack.    Prescribed alendronate in 2017 due to osteoporosis with a -3.1 T score.  She took it for couple years, then stopped.  Has not been taking it consistently for a few years.      REVIEW OF SYSTEMS:    Pertinent items are noted in HPI.    Current Outpatient Medications   Medication Sig Dispense Refill     albuterol (2.5 MG/3ML) 0.083% neb solution Take 1 vial (2.5 mg) by nebulization 4 times daily 360 mL 3     albuterol (PROAIR HFA/PROVENTIL HFA/VENTOLIN HFA) 108 (90 Base) MCG/ACT inhaler INHALE 2 PUFFS INTO THE LUNGS 4 TIMES DAILY AS NEEDED 8.5 g 11     alendronate (FOSAMAX) 70 MG tablet Take 1 tablet (70 mg) by mouth every 7 days Take in the morning on empty stomach with full glass of water. Do not lie down for 1 hr. 13 tablet 3     azelastine (ASTEPRO) 0.15 % nasal spray Spray 2 sprays into both nostrils daily 30 mL 11     ciprofloxacin (CIPRO) 500 MG tablet Take 1 tablet (500 mg) by mouth 2 times daily 14 tablet 0     diclofenac (VOLTAREN) 1 % topical gel Apply 2 g topically 4 times daily 150 g 1     fluticasone (FLONASE) 50 MCG/ACT nasal spray Spray 1 spray into both nostrils daily 18 g 11     gabapentin  (NEURONTIN) 400 MG capsule TAKE 1 CAPSULE BY MOUTH DAILY AND 2 CAPSULES AT BEDTIME 270 capsule 3     hydrOXYzine (ATARAX) 10 MG tablet Take 1-2 tablets (10-20 mg) by mouth 3 times daily as needed 60 tablet 3     lisinopril (ZESTRIL) 5 MG tablet TAKE 1 TABLET(5MG) BY MOUTH A DAY 90 tablet 4     sodium chloride 3 % NEBU neb solution INHALE 3ML AS DIRECTED       tiotropium (SPIRIVA RESPIMAT) 2.5 MCG/ACT inhaler Inhale 2 puffs into the lungs daily 4 g 3     traZODone (DESYREL) 150 MG tablet TAKE 1 TABLET(150 MG) BY MOUTH AT BEDTIME 90 tablet 3     venlafaxine (EFFEXOR-XR) 150 MG 24 hr capsule Take 2 capsules (300 mg) by mouth daily 180 capsule 3     No Known Allergies    OBJECTIVE:  /76 (BP Location: Right arm, Patient Position: Sitting, Cuff Size: Adult Large)   Pulse 84   Temp 97.3  F (36.3  C) (Tympanic)   Resp 20   Wt 58.1 kg (128 lb)   SpO2 98%   Breastfeeding No   BMI 22.14 kg/m      EXAM:  General Appearance: Alert. No acute distress  Chest/Respiratory Exam: Distant breath sounds  Cardiovascular Exam: Regular rate and rhythm. S1, S2, no murmur, gallop, or rubs.  Extremities: 2+ pedal pulses.  No lower extremity edema.  Psychiatric: Normal affect and mentation        Lung Cancer Screening Shared Decision Making Visit     Lisy Telles is eligible for lung cancer screening on the basis of the information provided in my signed lung cancer screening order.     I have discussed with patient the risks and benefits of screening for lung cancer with low-dose CT.     The risks include:  radiation exposure: one low dose chest CT has as much ionizing radiation as about 15 chest x-rays or 6 months of background radiation living in [place]    false positives: 96% of positive findings/nodules are NOT cancer, but some might still require additional diagnostic evaluation, including biopsy  over-diagnosis: some slow growing cancers that might never have been clinically significant will be detected and treated  unnecessarily     The benefit of early detection of lung cancer is contingent upon adherence to annual screening or more frequent follow up if indicated.     Furthermore, reaping the benefits of screening requires Lisy Telles to be willing and physically able to undergo diagnostic procedures, if indicated. Although no specific guide is available for determining severity of comorbidities, it is reasonable to withhold screening in patients who have greater mortality risk from other diseases.     We did discuss that the only way to prevent lung cancer is to not smoke. Smoking cessation counseling was given, duration < 3 minutes.      I did not offer risk estimation using a calculator such as this one:    ShouldIScreen

## 2022-01-26 ASSESSMENT — ANXIETY QUESTIONNAIRES: GAD7 TOTAL SCORE: 0

## 2022-01-26 ASSESSMENT — PATIENT HEALTH QUESTIONNAIRE - PHQ9: SUM OF ALL RESPONSES TO PHQ QUESTIONS 1-9: 0

## 2022-02-02 ENCOUNTER — OFFICE VISIT (OUTPATIENT)
Dept: INTERNAL MEDICINE | Facility: OTHER | Age: 62
End: 2022-02-02
Attending: INTERNAL MEDICINE
Payer: COMMERCIAL

## 2022-02-02 VITALS
WEIGHT: 128.4 LBS | HEART RATE: 109 BPM | TEMPERATURE: 98.2 F | BODY MASS INDEX: 22.21 KG/M2 | SYSTOLIC BLOOD PRESSURE: 134 MMHG | RESPIRATION RATE: 18 BRPM | DIASTOLIC BLOOD PRESSURE: 88 MMHG | OXYGEN SATURATION: 97 %

## 2022-02-02 DIAGNOSIS — K11.20 SIALOADENITIS: Primary | ICD-10-CM

## 2022-02-02 PROCEDURE — 99213 OFFICE O/P EST LOW 20 MIN: CPT | Performed by: INTERNAL MEDICINE

## 2022-02-02 RX ORDER — DICLOXACILLIN SODIUM 500 MG
500 CAPSULE ORAL 4 TIMES DAILY
Qty: 20 CAPSULE | Refills: 0 | Status: SHIPPED | OUTPATIENT
Start: 2022-02-02 | End: 2022-02-07

## 2022-02-02 ASSESSMENT — ENCOUNTER SYMPTOMS
ALLERGIC/IMMUNOLOGIC NEGATIVE: 1
ENDOCRINE NEGATIVE: 1
CONSTITUTIONAL NEGATIVE: 1
EYES NEGATIVE: 1

## 2022-02-02 ASSESSMENT — PAIN SCALES - GENERAL: PAINLEVEL: NO PAIN (0)

## 2022-02-02 NOTE — PROGRESS NOTES
Chief Complaint:  Swollen glands.    HPI: She is complaining of swollen glands on either side of her neck.  This came on rather abruptly on Monday.  It is not associated with any other symptoms.  She does not have a fever.  She does not have a sore throat.  She does not have any sinus issues, etc.  Medications are reconciled today.    Past Medical History:   Diagnosis Date     Age-related osteoporosis with current pathological fracture with routine healing     2017     Anxiety disorder     No Comments Provided     Appendicitis     No Comments Provided     Benign neoplasm of connective and other soft tissue, unspecified     Right Breast     Bilateral carpal tunnel syndrome      Brachial plexus disorders     2016     Chronic obstructive pulmonary disease (H)     No Comments Provided     Essential (primary) hypertension     No Comments Provided     Major depressive disorder, single episode     Depression with insomnia and tiredness     Other displaced fracture of upper end of left humerus, subsequent encounter for fracture with routine healing     2016     Other fracture of unspecified lower leg, initial encounter for closed fracture     Fracture, right ankle     Personal history of other medical treatment (CODE)      4, Para 4-0-0-4     Pure hypercholesterolemia     No Comments Provided     Rectal polyp     Hyperplastic polyp times three noted on colonoscopy 2007     Tobacco use     No Comments Provided     Uncomplicated alcohol dependence (H)     No Comments Provided       Past Surgical History:   Procedure Laterality Date     BRONCHOSCOPY DIAGNOSTIC  2018    MAC infection     CLOSED REDUCTION ANKLE      07,Open reduction/internal fixation, right ankle fracture 07.     COLONOSCOPY      ,screening for family history,  follow-up recommended in five years.     COLONOSCOPY      ,Colonoscopy 2007.  Follow-up recommended in five years.     COLONOSCOPY  2013     2/2013,Normal, hemorrhoids - follow up 5 years for family history of colon cnacer     COLONOSCOPY N/A 02/01/2021    F/U 2026 FH. hyperplastic   Surgeon: Marcel Dee MD;  Location: [facility] OR     HYSTERECTOMY TOTAL ABDOMINAL      04/26/2007,Laparoscopic supracervical hysterectomy and bilateral salpingo-oophorectomy for bleeding fibroid     LAPAROSCOPIC BYPASS GASTRIC      2007     LAPAROSCOPIC TUBAL LIGATION      1985     LOBECTOMY LUNG  08/07/2018    VATS - LOBECTOMY UPPER LUNG, Dr Edita [address]     LUMPECTOMY BREAST      Lumpectomy of the right breast     RELEASE CARPAL TUNNEL Left 02/21/2019    Procedure: RELEASE CARPAL TUNNEL;  Surgeon: Andrew Sahni MD;  Location: [facility] OR     RELEASE CARPAL TUNNEL Right 04/04/2019    Procedure: Open Carpal Tunnel Release;  Surgeon: Andrew Sahni MD;  Location: [facility] OR     SKIN GRAFT, EACH ADDN 100SQCM  03/08/2007    Skin grafting post right ankle fracture       No Known Allergies    Current Outpatient Medications   Medication Sig Dispense Refill     albuterol (PROAIR HFA/PROVENTIL HFA/VENTOLIN HFA) 108 (90 Base) MCG/ACT inhaler INHALE 2 PUFFS INTO THE LUNGS 4 TIMES DAILY AS NEEDED 8.5 g 11     albuterol (PROVENTIL) (2.5 MG/3ML) 0.083% neb solution Take 1 vial (2.5 mg) by nebulization 4 times daily 360 mL 3     alendronate (FOSAMAX) 70 MG tablet Take 1 tablet (70 mg) by mouth every 7 days Take in the morning on empty stomach with full glass of water. Do not lie down for 1 hr. 13 tablet 3     azelastine (ASTEPRO) 0.15 % nasal spray Spray 2 sprays into both nostrils daily 30 mL 11     diclofenac (VOLTAREN) 1 % topical gel Apply 2 g topically 4 times daily 150 g 1     fluticasone (FLONASE) 50 MCG/ACT nasal spray Spray 1 spray into both nostrils daily 18 g 11     gabapentin (NEURONTIN) 400 MG capsule TAKE 1 CAPSULE BY MOUTH DAILY AND 2 CAPSULES AT BEDTIME 270 capsule 3     hydrOXYzine (ATARAX) 10 MG tablet Take 1-2 tablets (10-20 mg) by mouth 3 times daily as needed for anxiety 60  tablet 5     lisinopril (ZESTRIL) 5 MG tablet TAKE 1 TABLET(5MG) BY MOUTH A DAY 90 tablet 4     sodium chloride (NEBUSAL) 3 % neb solution Take 3 mLs by nebulization 3 times daily as needed for wheezing INHALE 3ML AS DIRECTED 45 mL 3     tiotropium (SPIRIVA RESPIMAT) 2.5 MCG/ACT inhaler Inhale 2 puffs into the lungs daily 4 g 3     traZODone (DESYREL) 150 MG tablet TAKE 1 TABLET(150 MG) BY MOUTH AT BEDTIME 90 tablet 3     venlafaxine (EFFEXOR-XR) 150 MG 24 hr capsule Take 2 capsules (300 mg) by mouth daily 180 capsule 3     vitamin D3 (CHOLECALCIFEROL) 50 mcg (2000 units) tablet Take 1 tablet (50 mcg) by mouth daily         Review of Systems   Constitutional: Negative.    Eyes: Negative.    Endocrine: Negative.    Allergic/Immunologic: Negative.        Physical Exam  Vitals and nursing note reviewed.   Constitutional:       General: She is not in acute distress.     Appearance: Normal appearance. She is not ill-appearing, toxic-appearing or diaphoretic.   HENT:      Right Ear: Tympanic membrane normal.      Left Ear: Tympanic membrane normal.      Nose:      Right Sinus: No maxillary sinus tenderness or frontal sinus tenderness.      Left Sinus: No maxillary sinus tenderness or frontal sinus tenderness.   Neck:      Comments: She has swelling of the salivary glands under the mandibular region bilaterally.  No definite worrisome palpable lymph nodes.  Neurological:      Mental Status: She is alert.         Assessment:        ICD-10-CM    1. Sialoadenitis  K11.20        Plan: Based on her history and exam it appears as though she has sialoadenitis.  This was reviewed with her today.  Recommended moist heat to the area.  Recommended adequate hydration and possibly lozenges or similar.  Dicloxacillin for 5 days.  Return if not improving or if any residual swelling persists.

## 2022-02-02 NOTE — NURSING NOTE
"Chief Complaint   Patient presents with     Other     Swollen Lymph Nodes       Initial /88   Pulse 109   Temp 98.2  F (36.8  C) (Tympanic)   Resp 18   Wt 58.2 kg (128 lb 6.4 oz)   SpO2 97%   Breastfeeding No   BMI 22.21 kg/m   Estimated body mass index is 22.21 kg/m  as calculated from the following:    Height as of 1/3/22: 1.619 m (5' 3.75\").    Weight as of this encounter: 58.2 kg (128 lb 6.4 oz).  FOOD SECURITY SCREENING QUESTIONS  Hunger Vital Signs:  Within the past 12 months we worried whether our food would run out before we got money to buy more. Never  Within the past 12 months the food we bought just didn't last and we didn't have money to get more. Never        Kishan Wall, MARIAM    "

## 2022-02-11 ENCOUNTER — HOSPITAL ENCOUNTER (OUTPATIENT)
Dept: CT IMAGING | Facility: OTHER | Age: 62
End: 2022-02-11
Attending: FAMILY MEDICINE
Payer: COMMERCIAL

## 2022-02-11 ENCOUNTER — LAB (OUTPATIENT)
Dept: LAB | Facility: OTHER | Age: 62
End: 2022-02-11
Attending: FAMILY MEDICINE
Payer: COMMERCIAL

## 2022-02-11 ENCOUNTER — HOSPITAL ENCOUNTER (OUTPATIENT)
Dept: BONE DENSITY | Facility: OTHER | Age: 62
End: 2022-02-11
Attending: FAMILY MEDICINE
Payer: COMMERCIAL

## 2022-02-11 DIAGNOSIS — Z23 NEED FOR PROPHYLACTIC VACCINATION AND INOCULATION AGAINST INFLUENZA: ICD-10-CM

## 2022-02-11 DIAGNOSIS — M81.0 AGE-RELATED OSTEOPOROSIS WITHOUT CURRENT PATHOLOGICAL FRACTURE: ICD-10-CM

## 2022-02-11 LAB
ALBUMIN SERPL-MCNC: 3.8 G/DL (ref 3.5–5.7)
ALP SERPL-CCNC: 53 U/L (ref 34–104)
ALT SERPL W P-5'-P-CCNC: 23 U/L (ref 7–52)
ANION GAP SERPL CALCULATED.3IONS-SCNC: 5 MMOL/L (ref 3–14)
AST SERPL W P-5'-P-CCNC: 24 U/L (ref 13–39)
BILIRUB DIRECT SERPL-MCNC: 0.1 MG/DL (ref 0–0.2)
BILIRUB SERPL-MCNC: 0.4 MG/DL (ref 0.3–1)
BUN SERPL-MCNC: 11 MG/DL (ref 7–25)
CALCIUM SERPL-MCNC: 8.9 MG/DL (ref 8.6–10.3)
CHLORIDE BLD-SCNC: 105 MMOL/L (ref 98–107)
CO2 SERPL-SCNC: 30 MMOL/L (ref 21–31)
CREAT SERPL-MCNC: 0.92 MG/DL (ref 0.6–1.2)
DEPRECATED CALCIDIOL+CALCIFEROL SERPL-MC: 16 UG/L (ref 30–100)
GFR SERPL CREATININE-BSD FRML MDRD: 71 ML/MIN/1.73M2
GLUCOSE BLD-MCNC: 84 MG/DL (ref 70–105)
MAGNESIUM SERPL-MCNC: 1.8 MG/DL (ref 1.9–2.7)
POTASSIUM BLD-SCNC: 4.4 MMOL/L (ref 3.5–5.1)
PROT SERPL-MCNC: 6.3 G/DL (ref 6.4–8.9)
SODIUM SERPL-SCNC: 140 MMOL/L (ref 134–144)

## 2022-02-11 PROCEDURE — 82248 BILIRUBIN DIRECT: CPT | Mod: ZL

## 2022-02-11 PROCEDURE — 71271 CT THORAX LUNG CANCER SCR C-: CPT

## 2022-02-11 PROCEDURE — 36415 COLL VENOUS BLD VENIPUNCTURE: CPT | Mod: ZL

## 2022-02-11 PROCEDURE — 82310 ASSAY OF CALCIUM: CPT | Mod: ZL

## 2022-02-11 PROCEDURE — 82306 VITAMIN D 25 HYDROXY: CPT | Mod: ZL

## 2022-02-11 PROCEDURE — 77080 DXA BONE DENSITY AXIAL: CPT

## 2022-02-11 PROCEDURE — 83735 ASSAY OF MAGNESIUM: CPT | Mod: ZL

## 2022-03-25 DIAGNOSIS — J43.9 COPD WITH CHRONIC BRONCHITIS AND EMPHYSEMA (H): Primary | ICD-10-CM

## 2022-03-25 DIAGNOSIS — J44.89 COPD WITH CHRONIC BRONCHITIS AND EMPHYSEMA (H): Primary | ICD-10-CM

## 2022-03-25 NOTE — TELEPHONE ENCOUNTER
Spoke with patient and she is going to call insurance company to check what alternative is for Spiriva with the plan she has. Will call back with name of medication.  Vilma Quispe LPN ....................  3/25/2022   10:49 AM

## 2022-03-25 NOTE — TELEPHONE ENCOUNTER
Pt cannot afford the co-pay for the prescription for Spiriva.  She would like to get an alternate/cheaper co-pay medication.  Please call.    Yosi Tavares on 3/25/2022 at 10:07 AM

## 2022-03-28 DIAGNOSIS — M25.50 MULTIPLE JOINT PAIN: ICD-10-CM

## 2022-03-28 NOTE — TELEPHONE ENCOUNTER
Patient called back and would like the generic for spiriva which is Spiriva Hand Inhaler. She would like this new RX sent to [facility].     Candace Shields on 3/28/2022 at 2:44 PM

## 2022-03-29 RX ORDER — TIOTROPIUM BROMIDE 18 UG/1
18 CAPSULE ORAL; RESPIRATORY (INHALATION) DAILY
Qty: 30 CAPSULE | Refills: 4 | Status: SHIPPED | OUTPATIENT
Start: 2022-03-29 | End: 2022-07-03

## 2022-05-21 ENCOUNTER — HEALTH MAINTENANCE LETTER (OUTPATIENT)
Age: 62
End: 2022-05-21

## 2022-07-03 ENCOUNTER — OFFICE VISIT (OUTPATIENT)
Dept: FAMILY MEDICINE | Facility: OTHER | Age: 62
End: 2022-07-03
Attending: NURSE PRACTITIONER
Payer: COMMERCIAL

## 2022-07-03 ENCOUNTER — HOSPITAL ENCOUNTER (OUTPATIENT)
Dept: GENERAL RADIOLOGY | Facility: OTHER | Age: 62
Discharge: HOME | End: 2022-07-03
Attending: FAMILY MEDICINE
Payer: COMMERCIAL

## 2022-07-03 VITALS
HEART RATE: 100 BPM | TEMPERATURE: 103 F | WEIGHT: 127 LBS | HEIGHT: 64 IN | SYSTOLIC BLOOD PRESSURE: 118 MMHG | BODY MASS INDEX: 21.68 KG/M2 | DIASTOLIC BLOOD PRESSURE: 88 MMHG | OXYGEN SATURATION: 95 % | RESPIRATION RATE: 18 BRPM

## 2022-07-03 DIAGNOSIS — J43.9 COPD WITH CHRONIC BRONCHITIS AND EMPHYSEMA (H): ICD-10-CM

## 2022-07-03 DIAGNOSIS — R53.81 MALAISE: ICD-10-CM

## 2022-07-03 DIAGNOSIS — B99.9 FEVER DUE TO INFECTION: ICD-10-CM

## 2022-07-03 DIAGNOSIS — N39.0 URINARY TRACT INFECTION WITHOUT HEMATURIA, SITE UNSPECIFIED: ICD-10-CM

## 2022-07-03 DIAGNOSIS — S40.862A TICK BITE OF LEFT UPPER ARM, INITIAL ENCOUNTER: Primary | ICD-10-CM

## 2022-07-03 DIAGNOSIS — J44.89 COPD WITH CHRONIC BRONCHITIS AND EMPHYSEMA (H): ICD-10-CM
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
## 2022-07-03 LAB
ALBUMIN UR-MCNC: 70 MG/DL
APPEARANCE UR: ABNORMAL
BACTERIA #/AREA URNS HPF: ABNORMAL /HPF
BASOPHILS # BLD AUTO: 0 10E3/UL (ref 0–0.2)
BASOPHILS NFR BLD AUTO: 1 %
BILIRUB UR QL STRIP: NEGATIVE
COLOR UR AUTO: YELLOW
EOSINOPHIL # BLD AUTO: 0 10E3/UL (ref 0–0.7)
EOSINOPHIL NFR BLD AUTO: 0 %
ERYTHROCYTE [DISTWIDTH] IN BLOOD BY AUTOMATED COUNT: 14.9 % (ref 10–15)
GLUCOSE UR STRIP-MCNC: NEGATIVE MG/DL
HCT VFR BLD AUTO: 38.1 % (ref 35–47)
HGB BLD-MCNC: 12.3 G/DL (ref 11.7–15.7)
HGB UR QL STRIP: ABNORMAL
IMM GRANULOCYTES # BLD: 0 10E3/UL
IMM GRANULOCYTES NFR BLD: 0 %
KETONES UR STRIP-MCNC: NEGATIVE MG/DL
LEUKOCYTE ESTERASE UR QL STRIP: ABNORMAL
LYMPHOCYTES # BLD AUTO: 0.3 10E3/UL (ref 0.8–5.3)
LYMPHOCYTES NFR BLD AUTO: 8 %
MCH RBC QN AUTO: 28 PG (ref 26.5–33)
MCHC RBC AUTO-ENTMCNC: 32.3 G/DL (ref 31.5–36.5)
MCV RBC AUTO: 87 FL (ref 78–100)
MONOCYTES # BLD AUTO: 0.3 10E3/UL (ref 0–1.3)
MONOCYTES NFR BLD AUTO: 8 %
MUCOUS THREADS #/AREA URNS LPF: PRESENT /LPF
NEUTROPHILS # BLD AUTO: 3.3 10E3/UL (ref 1.6–8.3)
NEUTROPHILS NFR BLD AUTO: 83 %
NITRATE UR QL: POSITIVE
NRBC # BLD AUTO: 0 10E3/UL
NRBC BLD AUTO-RTO: 0 /100
PH UR STRIP: 5.5 [PH] (ref 5–9)
PLATELET # BLD AUTO: 167 10E3/UL (ref 150–450)
RBC # BLD AUTO: 4.4 10E6/UL (ref 3.8–5.2)
RBC URINE: 1 /HPF
SP GR UR STRIP: 1.03 (ref 1–1.03)
SQUAMOUS EPITHELIAL: 4 /HPF
UROBILINOGEN UR STRIP-MCNC: 2 MG/DL
WBC # BLD AUTO: 4 10E3/UL (ref 4–11)
WBC URINE: 7 /HPF

## 2022-07-03 PROCEDURE — U0003 INFECTIOUS AGENT DETECTION BY NUCLEIC ACID (DNA OR RNA); SEVERE ACUTE RESPIRATORY SYNDROME CORONAVIRUS 2 (SARS-COV-2) (CORONAVIRUS DISEASE [COVID-19]), AMPLIFIED PROBE TECHNIQUE, MAKING USE OF HIGH THROUGHPUT TECHNOLOGIES AS DESCRIBED BY CMS-2020-01-R: HCPCS | Mod: ZL | Performed by: FAMILY MEDICINE

## 2022-07-03 PROCEDURE — 85025 COMPLETE CBC W/AUTO DIFF WBC: CPT | Mod: ZL | Performed by: FAMILY MEDICINE

## 2022-07-03 PROCEDURE — 81001 URINALYSIS AUTO W/SCOPE: CPT | Mod: ZL | Performed by: FAMILY MEDICINE

## 2022-07-03 PROCEDURE — 71046 X-RAY EXAM CHEST 2 VIEWS: CPT | Mod: TC

## 2022-07-03 PROCEDURE — 86618 LYME DISEASE ANTIBODY: CPT | Mod: ZL | Performed by: FAMILY MEDICINE

## 2022-07-03 PROCEDURE — 87086 URINE CULTURE/COLONY COUNT: CPT | Mod: ZL | Performed by: FAMILY MEDICINE

## 2022-07-03 PROCEDURE — 36415 COLL VENOUS BLD VENIPUNCTURE: CPT | Mod: ZL | Performed by: FAMILY MEDICINE

## 2022-07-03 PROCEDURE — 99214 OFFICE O/P EST MOD 30 MIN: CPT | Performed by: FAMILY MEDICINE

## 2022-07-03 PROCEDURE — C9803 HOPD COVID-19 SPEC COLLECT: HCPCS | Performed by: FAMILY MEDICINE

## 2022-07-03 RX ORDER — AMOXICILLIN AND CLAVULANATE POTASSIUM 500; 125 MG/1; MG/1
1 TABLET, FILM COATED ORAL 3 TIMES DAILY
Qty: 21 TABLET | Refills: 0 | Status: SHIPPED | OUTPATIENT
Start: 2022-07-03 | End: 2022-07-10

## 2022-07-03 ASSESSMENT — PAIN SCALES - GENERAL: PAINLEVEL: MODERATE PAIN (4)

## 2022-07-03 NOTE — PATIENT INSTRUCTIONS
Take prescribed medication as directed.    Keep up fluids.    Have a follow up check in the next week to review things.    Return if feeling worse.

## 2022-07-03 NOTE — PROGRESS NOTES
(S40.862A,  W57.XXXA) Tick bite of left upper arm, initial encounter  (primary encounter diagnosis)  Comment:   Plan: Lyme Disease Total Abs Bld with Reflex to         Confirm CLIA        Test is pending.      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  Plan:       (N39.0) Urinary tract infection without hematuria, site unspecified.  Comment: Urinalysis is abnormal.  Suggestive of UTI.  No leukocytosis.  Plan: amoxicillin-clavulanate (AUGMENTIN) 500-125 MG         Tablet        Discussion:  Patient presents with fever and not much in the way of other suggestive symptoms.  See history below.  Urinalysis is abnormal so therefore antibiotic is begun.  Still pending her COVID and Lyme tests.  I did ask her to have a follow-up.          CHIEF COMPLAINT    Fever.  Malaise.  Recent tick bite.      HISTORY    Lisy is a 61-year-old woman with a history of fibromyalgia, COPD.  She notices that she had a wood tick bite on the left deltoid region about 2 weeks ago and it left a small reddish area on the skin but not a larger rash.    In the last 3 days she has felt feverish and fatigued and a bit generally achy.    She has not necessarily noticed that her cough had worsened but has some persistent cough due to her COPD.  She has not lost taste or smell.  She has felt kind of tired and generally achy and is concerned about Lyme.    Patient has not had COVID or Lyme disease previously.    She has been vaccinated and boosted for COVID.      REVIEW OF SYSTEMS    Fever  "noted.  No sore throat.  No head congestion.  Some cough with minimal sputum production.  No shortness of breath.  No chest pain.  No edema.  No abdominal pain.  No vomiting or diarrhea.  No dysuria or frequency.  No rashes.      EXAM  /88 (BP Location: Left arm, Patient Position: Sitting, Cuff Size: Adult Regular)   Pulse 100   Temp (!) 103  F (39.4  C) (Tympanic)   Resp 18   Ht 1.626 m (5' 4\")   Wt 57.6 kg (127 lb)   SpO2 95%   BMI 21.80 kg/m      Patient with thin, NAD.  Appears basically healthy.  HEENT shows clear conjunctiva, normal pharynx without inflammation.  Neck normal masses or thyromegaly or adenopathy.  Lungs clear on inspiration, occasional expiratory wheeze.  Cardiac rhythm regular, no murmur.  Abdomen soft and nontender.  Extremities no edema.  Speech, motor, gait WNL.      Results for orders placed or performed during the hospital encounter of 07/03/22   XR Chest 2 Views     Status: None    Narrative    PROCEDURE INFORMATION:   Exam: XR Chest   Exam date and time: 7/3/2022 3:06 PM   Age: 61 years old   Clinical indication: Unspecified infectious disease; Fever; Additional info:   Fever due to infection     TECHNIQUE:   Imaging protocol: Radiologic exam of the chest.   Views: 2 views.     COMPARISON:   CT CHEST LUNG CANCER SCREEN LOW DOSE WITHOUT 2/11/2022 10:58 AM     FINDINGS:   Lungs: Hyperaeration.   Pleural spaces: No pneumothorax.   Heart/Mediastinum: Unremarkable. No cardiomegaly.   Vasculature: Tortuous calcified aorta.   Bones/joints: Old healed rib fractures on the right. Irregularity of the left   humeral head possibly related to old trauma. Small compression fracture of the   lower thoracic vertebral body. This is unchanged from prior exam.       Impression    IMPRESSION:   No evidence of significant interval change     THIS DOCUMENT HAS BEEN ELECTRONICALLY SIGNED BY CAL MARSHALL MD   Results for orders placed or performed in visit on 07/03/22   CBC with platelets and " differential     Status: Abnormal   Result Value Ref Range    WBC Count 4.0 4.0 - 11.0 10e3/uL    RBC Count 4.40 3.80 - 5.20 10e6/uL    Hemoglobin 12.3 11.7 - 15.7 g/dL    Hematocrit 38.1 35.0 - 47.0 %    MCV 87 78 - 100 fL    MCH 28.0 26.5 - 33.0 pg    MCHC 32.3 31.5 - 36.5 g/dL    RDW 14.9 10.0 - 15.0 %    Platelet Count 167 150 - 450 10e3/uL    % Neutrophils 83 %    % Lymphocytes 8 %    % Monocytes 8 %    % Eosinophils 0 %    % Basophils 1 %    % Immature Granulocytes 0 %    NRBCs per 100 WBC 0 <1 /100    Absolute Neutrophils 3.3 1.6 - 8.3 10e3/uL    Absolute Lymphocytes 0.3 (L) 0.8 - 5.3 10e3/uL    Absolute Monocytes 0.3 0.0 - 1.3 10e3/uL    Absolute Eosinophils 0.0 0.0 - 0.7 10e3/uL    Absolute Basophils 0.0 0.0 - 0.2 10e3/uL    Absolute Immature Granulocytes 0.0 <=0.4 10e3/uL    Absolute NRBCs 0.0 10e3/uL   UA with Microscopic reflex to Culture     Status: Abnormal    Specimen: Urine, Midstream   Result Value Ref Range    Color Urine Yellow Colorless, Straw, Light Yellow, Yellow    Appearance Urine Slightly Cloudy (A) Clear    Glucose Urine Negative Negative mg/dL    Bilirubin Urine Negative Negative    Ketones Urine Negative Negative mg/dL    Specific Gravity Urine 1.028 1.000 - 1.030    Blood Urine Trace (A) Negative    pH Urine 5.5 5.0 - 9.0    Protein Albumin Urine 70  (A) Negative mg/dL    Urobilinogen Urine 2.0 Normal, 2.0 mg/dL    Nitrite Urine Positive (A) Negative    Leukocyte Esterase Urine Moderate (A) Negative    Bacteria Urine Many (A) None Seen /HPF    Mucus Urine Present (A) None Seen /LPF    RBC Urine 1 <=2 /HPF    WBC Urine 7 (H) <=5 /HPF    Squamous Epithelials Urine 4 (H) <=1 /HPF    Narrative    Urine Culture ordered based on laboratory criteria   CBC and Differential     Status: Abnormal    Narrative    The following orders were created for panel order CBC and Differential.  Procedure                               Abnormality         Status                     ---------                                -----------         ------                     CBC with platelets and d...[[id number]]  Abnormal            Final result                 Please view results for these tests on the individual orders.

## 2022-07-04 LAB — SARS-COV-2 RNA RESP QL NAA+PROBE: NEGATIVE

## 2022-07-05 ENCOUNTER — TELEPHONE (OUTPATIENT)
Dept: FAMILY MEDICINE | Facility: OTHER | Age: 62
End: 2022-07-05

## 2022-07-05 LAB
B BURGDOR IGG+IGM SER QL: 0.09
BACTERIA UR CULT: ABNORMAL

## 2022-07-05 NOTE — TELEPHONE ENCOUNTER
Patient was told to call back today for RC to get test results from 7/3/2022.    Candace Shields on 7/5/2022 at 10:33 AM

## 2022-07-05 NOTE — TELEPHONE ENCOUNTER
Patient states she was seen in [facility] this weekend and was told to call to get results today. Please call/advise.    Vanessa Ramey on 7/5/2022 at 12:16 PM

## 2022-07-05 NOTE — TELEPHONE ENCOUNTER
Patient called again-I let know it is very busy-she had many tests and would like a call today please

## 2022-07-05 NOTE — TELEPHONE ENCOUNTER
Please call with results; urine culture positive for UTI. Culture shows sensitivity to Augmentin, patient should continue her antibiotic. If symptoms persist, recommend reevaluation with PCP if possible.     Lyme negative.     Romina Tolliver PA-C  7/5/2022  10:43 AM

## 2022-08-08 DIAGNOSIS — J43.9 COPD WITH CHRONIC BRONCHITIS AND EMPHYSEMA (H): Primary | ICD-10-CM

## 2022-08-08 DIAGNOSIS — J44.89 COPD WITH CHRONIC BRONCHITIS AND EMPHYSEMA (H): Primary | ICD-10-CM

## 2022-08-08 RX ORDER — TIOTROPIUM BROMIDE 18 UG/1
18 CAPSULE ORAL; RESPIRATORY (INHALATION) DAILY
Qty: 90 CAPSULE | Refills: 3 | Status: SHIPPED | OUTPATIENT
Start: 2022-08-08 | End: 2023-08-25

## 2022-08-08 NOTE — TELEPHONE ENCOUNTER
Patient sent Rx request for the following:      Requested Prescriptions   Pending Prescriptions Disp Refills     tiotropium (SPIRIVA) 18 MCG inhaled capsule  3     Sig: Inhale 1 capsule (18 mcg) into the lungs daily     Last Prescription Date:   3/29/22  Last Fill Qty/Refills:         30, R-4    Last Office Visit:              2/2/22   Future Office visit:           None   Discontinued on OV on 7/3/22.  Unable to complete prescription refill per RN Medication Refill Policy.   Romina Grace RN on 8/8/2022 at 4:46 PM

## 2022-08-08 NOTE — TELEPHONE ENCOUNTER
Reason for call: Medication or medication refill    Name of medication requested: Spiriva    Are you out of the medication? Yes    What pharmacy do you use? [facility]    Preferred method for responding to this message: Telephone Call    Phone number patient can be reached at: Cell number on file:    Telephone Information:   Mobile [phone]       If we cannot reach you directly, may we leave a detailed response at the number you provided? Yes     Vanessa Ramey on 8/8/2022 at 4:27 PM

## 2022-09-17 ENCOUNTER — HEALTH MAINTENANCE LETTER (OUTPATIENT)
Age: 62
End: 2022-09-17

## 2022-09-19 DIAGNOSIS — J43.9 COPD WITH CHRONIC BRONCHITIS AND EMPHYSEMA (H): ICD-10-CM

## 2022-09-19 DIAGNOSIS — J44.89 COPD WITH CHRONIC BRONCHITIS AND EMPHYSEMA (H): ICD-10-CM

## 2022-09-21 RX ORDER — ALBUTEROL SULFATE 90 UG/1
AEROSOL, METERED RESPIRATORY (INHALATION)
Qty: 8.5 G | Refills: 11 | Status: SHIPPED | OUTPATIENT
Start: 2022-09-21 | End: 2023-04-05

## 2022-09-21 NOTE — TELEPHONE ENCOUNTER
"  Last Prescription Date: 5/26/21  Last Qty/Refills: 8.5 g / 11  Last Office Visit: 1/25/22  Future Office Visit: None     Requested Prescriptions   Pending Prescriptions Disp Refills     albuterol (PROAIR HFA/PROVENTIL HFA/VENTOLIN HFA) 108 (90 Base) MCG/ACT inhaler [Pharmacy Med Name: ALBUTEROL HFA INH (200 PUFFS)8.5GM] 8.5 g 11     Sig: INHALE 2 PUFFS INTO THE LUNGS FOUR TIMES DAILY AS NEEDED       Asthma Maintenance Inhalers - Anticholinergics Passed - 9/19/2022  5:48 PM        Passed - Patient is age 12 years or older        Passed - Recent (12 mo) or future (30 days) visit within the authorizing provider's specialty     Patient has had an office visit with the authorizing provider or a provider within the authorizing providers department within the previous 12 mos or has a future within next 30 days. See \"Patient Info\" tab in inbasket, or \"Choose Columns\" in Meds & Orders section of the refill encounter.              Passed - Medication is active on med list       Short-Acting Beta Agonist Inhalers Protocol  Passed - 9/19/2022  5:48 PM        Passed - Patient is age 12 or older        Passed - Recent (12 mo) or future (30 days) visit within the authorizing provider's specialty     Patient has had an office visit with the authorizing provider or a provider within the authorizing providers department within the previous 12 mos or has a future within next 30 days. See \"Patient Info\" tab in inbasket, or \"Choose Columns\" in Meds & Orders section of the refill encounter.              Passed - Medication is active on med list             "

## 2022-10-04 PROBLEM — R91.1 SOLITARY PULMONARY NODULE: Status: ACTIVE | Noted: 2017-10-05

## 2022-10-16 NOTE — TELEPHONE ENCOUNTER
Patient Information     Patient Name MRN Sex Lisy Cassidy [id number] Female 1960      Telephone Encounter by Tony Varma LPN at 10/16/2017 12:08 PM     Author:  Tony Varma LPN Service:  (none) Author Type:  NURS- Licensed Practical Nurse     Filed:  10/16/2017 12:08 PM Encounter Date:  10/16/2017 Status:  Signed     :  Tony Varma LPN (NURS- Licensed Practical Nurse)            Attempted to reach patient would below information. Got answering machine with patient's voice and name and was told a message could be left. I advised her of what her PCP suggested and to call back if she had any questions.  Tony Varma LPN ..............10/16/2017 12:08 PM           No

## 2022-10-31 DIAGNOSIS — J44.89 COPD WITH CHRONIC BRONCHITIS AND EMPHYSEMA (H): ICD-10-CM

## 2022-10-31 DIAGNOSIS — G25.81 RESTLESS LEGS SYNDROME (RLS): ICD-10-CM

## 2022-10-31 DIAGNOSIS — J43.9 COPD WITH CHRONIC BRONCHITIS AND EMPHYSEMA (H): ICD-10-CM

## 2022-11-03 ENCOUNTER — MYC MEDICAL ADVICE (OUTPATIENT)
Dept: FAMILY MEDICINE | Facility: OTHER | Age: 62
End: 2022-11-03

## 2022-11-03 RX ORDER — ALBUTEROL SULFATE 90 UG/1
AEROSOL, METERED RESPIRATORY (INHALATION)
Qty: 54 G | Refills: 2 | Status: CANCELLED | OUTPATIENT
Start: 2022-11-03

## 2022-11-03 RX ORDER — GABAPENTIN 400 MG/1
CAPSULE ORAL
Qty: 270 CAPSULE | Refills: 0 | Status: SHIPPED | OUTPATIENT
Start: 2022-11-03 | End: 2023-02-14

## 2022-11-03 NOTE — TELEPHONE ENCOUNTER
Proair inhaler was refilled and the Gabapentin was not.  Please send in refill request for 90 day supply of Gabapentin.    Primary provider is out of the office, routing to Doctor of the Day to address as patient is out of this medication.    Qi Maradiaga LPN............11/3/2022 1:18 PM

## 2022-11-03 NOTE — TELEPHONE ENCOUNTER
[facility] sent Rx request for the following:      Requested Prescriptions   Pending Prescriptions Disp Refills     gabapentin (NEURONTIN) 400 MG capsule 270 capsule 3     Sig: TAKE 1 CAPSULE BY MOUTH DAILY AND 2 CAPSULES AT BEDTIME   Last Prescription Date:   7/27/21  Last Fill Qty/Refills:         270, R-3         albuterol (PROAIR HFA/PROVENTIL HFA/VENTOLIN HFA) 108 (90 Base) MCG/ACT inhaler 54 g 0     Sig: INHALE 2 PUFFS INTO THE LUNGS FOUR TIMES DAILY AS NEEDED for shortness of breath or difficulty breathing or wheezing   Last Prescription Date:   9/21/22  Last Fill Qty/Refills:         8.5 g, R-11    Patient requesting a 90 day supply.    Last Office Visit:              2/2/22   Future Office visit:           None    Qi Parekh RN .............. 11/3/2022  10:11 AM

## 2022-11-14 ENCOUNTER — MYC MEDICAL ADVICE (OUTPATIENT)
Dept: FAMILY MEDICINE | Facility: OTHER | Age: 62
End: 2022-11-14

## 2022-11-14 DIAGNOSIS — J44.89 COPD WITH CHRONIC BRONCHITIS AND EMPHYSEMA (H): Primary | ICD-10-CM

## 2022-11-14 DIAGNOSIS — J43.9 COPD WITH CHRONIC BRONCHITIS AND EMPHYSEMA (H): Primary | ICD-10-CM

## 2022-11-14 NOTE — TELEPHONE ENCOUNTER
"S-(situation): \"I am having problems breathing, the Spiriva is not doing it. The hose is cracked on my nebulizer and need another mouthpiece. Blood oxygen is 97%\"    B-(background): see above. Last office visit was 2/2/22 with Dr. Moreno    A-(assessment): No immediate need for emergent care- patient did verbalize understanding on symptoms to present to ER if arise. States she feels shortness of breath from COPD is not being alleviated with current Spiriva.     R-(recommendations): Nebulizer equipment order pended. Please advise in change of medications or if patient needs an appointment    Corinne R Thayer, RN on 11/14/2022 at 4:31 PM    "

## 2022-11-15 NOTE — TELEPHONE ENCOUNTER
Printed prescription. Need to know where she will acquire. Should make an appointment to change COPD medication if using the neb is not helping.

## 2022-11-15 NOTE — TELEPHONE ENCOUNTER
Patient prefers [facility], signed order faxed.  Patient transferred to appointment line.      Laura Gee LPN 11/15/2022 11:43 AM

## 2022-12-29 ENCOUNTER — HOSPITAL ENCOUNTER (OUTPATIENT)
Dept: MRI IMAGING | Facility: OTHER | Age: 62
Discharge: HOME | End: 2022-12-29
Attending: NURSE PRACTITIONER | Admitting: NURSE PRACTITIONER
Payer: COMMERCIAL

## 2022-12-29 DIAGNOSIS — D32.9 MENINGIOMA (H): ICD-10-CM

## 2022-12-29 PROCEDURE — 255N000002 HC RX 255 OP 636: Performed by: RADIOLOGY

## 2022-12-29 PROCEDURE — 70553 MRI BRAIN STEM W/O & W/DYE: CPT

## 2022-12-29 PROCEDURE — A9575 INJ GADOTERATE MEGLUMI 0.1ML: HCPCS | Performed by: RADIOLOGY

## 2022-12-29 RX ORDER — GADOTERATE MEGLUMINE 376.9 MG/ML
15 INJECTION INTRAVENOUS ONCE
Status: COMPLETED | OUTPATIENT
Start: 2022-12-29 | End: 2022-12-29

## 2022-12-29 RX ADMIN — GADOTERATE MEGLUMINE 12 ML: 376.9 INJECTION INTRAVENOUS at 18:53

## 2023-02-11 NOTE — TELEPHONE ENCOUNTER
Patient Information     Patient Name MRN Lisy Lopez [id number] Female 1960      Telephone Encounter by Candy Myers MD at 10/20/2017  8:59 AM     Author:  Candy Myers MD Service:  (none) Author Type:  Physician     Filed:  10/20/2017  9:01 AM Encounter Date:  10/19/2017 Status:  Signed     :  Candy Myers MD (Physician)            Order placed.  Candy Myers MD          No

## 2023-02-13 DIAGNOSIS — G25.81 RESTLESS LEGS SYNDROME (RLS): ICD-10-CM

## 2023-02-14 RX ORDER — GABAPENTIN 400 MG/1
CAPSULE ORAL
Qty: 270 CAPSULE | Refills: 0 | Status: SHIPPED | OUTPATIENT
Start: 2023-02-14

## 2023-04-05 DIAGNOSIS — J44.89 COPD WITH CHRONIC BRONCHITIS AND EMPHYSEMA (H): ICD-10-CM

## 2023-04-05 DIAGNOSIS — J43.9 COPD WITH CHRONIC BRONCHITIS AND EMPHYSEMA (H): ICD-10-CM

## 2023-04-05 RX ORDER — ALBUTEROL SULFATE 90 UG/1
AEROSOL, METERED RESPIRATORY (INHALATION)
Qty: 54 G | Refills: 0 | Status: SHIPPED | OUTPATIENT
Start: 2023-04-05 | End: 2023-09-10

## 2023-04-05 NOTE — TELEPHONE ENCOUNTER
[facility] sent Rx request for the following:      Requested Prescriptions   Pending Prescriptions Disp Refills     albuterol (PROAIR HFA/PROVENTIL HFA/VENTOLIN HFA) 108 (90 Base) MCG/ACT inhaler [Pharmacy Med Name: Albuterol Sulfate  (90 Base) MCG/ACT Inhalation Aerosol Solution (Proair HFA, Ventolin HFA)] 8.5 g 11     Sig: Inhale 2 Puffs into the lungs four times a day as needed.   Last Prescription Date:   9/21/22  Last Fill Qty/Refills:         8.5 g, R-11    Last Office Visit:              2/2/22   Future Office visit:           None    Pt due for annual exam. Routing to provider for refill consideration. Routing to  OUTREACH APPT REQUESTS pool, to assist Pt in scheduling appointment.     Qi Parekh RN .............. 4/5/2023  11:49 AM

## 2023-05-06 ENCOUNTER — HEALTH MAINTENANCE LETTER (OUTPATIENT)
Age: 63
End: 2023-05-06

## 2023-06-04 ENCOUNTER — HEALTH MAINTENANCE LETTER (OUTPATIENT)
Age: 63
End: 2023-06-04

## 2023-08-21 DIAGNOSIS — J44.89 COPD WITH CHRONIC BRONCHITIS AND EMPHYSEMA (H): ICD-10-CM

## 2023-08-21 DIAGNOSIS — J43.9 COPD WITH CHRONIC BRONCHITIS AND EMPHYSEMA (H): ICD-10-CM

## 2023-08-23 NOTE — TELEPHONE ENCOUNTER
[facility] sent Rx request for the following:      Requested Prescriptions   Pending Prescriptions Disp Refills    SPIRIVA HANDIHALER 18 MCG inhaled capsule [Pharmacy Med Name: Spiriva HandiHaler 18 MCG Inhalation Capsule (tiotropium)] 90 capsule 3     Sig: Inhale 1 Capsule into the lungs one time a day using HandiHaler device       Asthma Maintenance Inhalers - Anticholinergics Failed - 8/23/2023  3:43 PM    Inhaled Steroids Protocol Failed - 8/23/2023  3:43 PM        Failed - Recent (12 mo) or future (30 days) visit within the authorizing provider's specialty       Last Prescription Date:   8/8/22  Last Fill Qty/Refills:         90, R-3    Last Office Visit:              1/25/22   Future Office visit:           None    Pt due for annual exam/establish care visit with new provider in absence of Dr. Peace. Routing to provider for refill consideration. Routing to Unit scheduling pool, to assist Pt in scheduling appointment.     Qi Parekh RN .............. 8/23/2023  3:45 PM

## 2023-08-25 RX ORDER — TIOTROPIUM BROMIDE 18 UG/1
CAPSULE ORAL; RESPIRATORY (INHALATION)
Qty: 90 CAPSULE | Refills: 0 | Status: SHIPPED | OUTPATIENT
Start: 2023-08-25

## 2023-09-05 DIAGNOSIS — J43.9 COPD WITH CHRONIC BRONCHITIS AND EMPHYSEMA (H): ICD-10-CM

## 2023-09-05 DIAGNOSIS — J44.89 COPD WITH CHRONIC BRONCHITIS AND EMPHYSEMA (H): ICD-10-CM

## 2023-09-10 RX ORDER — ALBUTEROL SULFATE 90 UG/1
AEROSOL, METERED RESPIRATORY (INHALATION)
Qty: 54 G | Refills: 0 | Status: SHIPPED | OUTPATIENT
Start: 2023-09-10

## 2023-12-05 NOTE — TELEPHONE ENCOUNTER
Please see the attached refill request.   [facility] sent Rx request for the following:      Requested Prescriptions   Pending Prescriptions Disp Refills     diclofenac (VOLTAREN) 1 % topical gel [Pharmacy Med Name: DICLOFENAC 1% GEL 100GM] 100 g      Sig: APPLY 2 GRAMS TOPICALLY TO THE AFFECTED AREA FOUR TIMES DAILY   Last Prescription Date:   8/27/20  Last Fill Qty/Refills:         150 g, R-1    Last Office Visit:              2/2/22   Future Office visit:           None    Qi Parekh RN .............. 3/29/2022  2:05 PM

## 2024-07-13 ENCOUNTER — HEALTH MAINTENANCE LETTER (OUTPATIENT)
Age: 64
End: 2024-07-13

## 2025-05-17 ENCOUNTER — HEALTH MAINTENANCE LETTER (OUTPATIENT)
Age: 65
End: 2025-05-17

## 2025-06-05 ENCOUNTER — HOSPITAL ENCOUNTER (INPATIENT)
Facility: OTHER | Age: 65
End: 2025-06-05
Attending: PHYSICIAN ASSISTANT
Payer: COMMERCIAL

## 2025-06-05 ENCOUNTER — ANESTHESIA (OUTPATIENT)
Dept: EMERGENCY MEDICINE | Facility: OTHER | Age: 65
End: 2025-06-05
Payer: COMMERCIAL

## 2025-06-05 ENCOUNTER — ANESTHESIA EVENT (OUTPATIENT)
Dept: EMERGENCY MEDICINE | Facility: OTHER | Age: 65
End: 2025-06-05
Payer: COMMERCIAL

## 2025-06-05 ENCOUNTER — APPOINTMENT (OUTPATIENT)
Dept: GENERAL RADIOLOGY | Facility: OTHER | Age: 65
End: 2025-06-05
Payer: COMMERCIAL

## 2025-06-05 ENCOUNTER — APPOINTMENT (OUTPATIENT)
Dept: CT IMAGING | Facility: OTHER | Age: 65
End: 2025-06-05
Payer: COMMERCIAL

## 2025-06-05 VITALS
RESPIRATION RATE: 18 BRPM | HEART RATE: 73 BPM | BODY MASS INDEX: 19.93 KG/M2 | DIASTOLIC BLOOD PRESSURE: 70 MMHG | SYSTOLIC BLOOD PRESSURE: 104 MMHG | TEMPERATURE: 99 F | WEIGHT: 124 LBS | HEIGHT: 66 IN | OXYGEN SATURATION: 94 %

## 2025-06-05 DIAGNOSIS — A79.82 HUMAN ANAPLASMOSIS DUE TO ANAPLASMA PHAGOCYTOPHILUM: ICD-10-CM

## 2025-06-05 DIAGNOSIS — R50.9 FEVER: ICD-10-CM

## 2025-06-05 DIAGNOSIS — F17.210 CIGARETTE NICOTINE DEPENDENCE WITHOUT COMPLICATION: ICD-10-CM

## 2025-06-05 DIAGNOSIS — F10.21 ALCOHOLISM IN RECOVERY (H): ICD-10-CM

## 2025-06-05 DIAGNOSIS — R41.82 ALTERED MENTAL STATUS: ICD-10-CM

## 2025-06-05 DIAGNOSIS — D69.6 THROMBOCYTOPENIA: ICD-10-CM

## 2025-06-05 DIAGNOSIS — A41.9 SEPSIS, DUE TO UNSPECIFIED ORGANISM, UNSPECIFIED WHETHER ACUTE ORGAN DYSFUNCTION PRESENT (H): Primary | ICD-10-CM

## 2025-06-05 DIAGNOSIS — R41.82 ALTERED MENTAL STATUS, UNSPECIFIED ALTERED MENTAL STATUS TYPE: ICD-10-CM

## 2025-06-05 DIAGNOSIS — E87.1 HYPONATREMIA: ICD-10-CM

## 2025-06-05 DIAGNOSIS — R50.9 FEVER, UNSPECIFIED FEVER CAUSE: ICD-10-CM

## 2025-06-05 LAB
ALBUMIN SERPL BCG-MCNC: 3.5 G/DL (ref 3.5–5.2)
ALBUMIN UR-MCNC: 50 MG/DL
ALP SERPL-CCNC: 79 U/L (ref 40–150)
ALT SERPL W P-5'-P-CCNC: 13 U/L (ref 0–50)
AMMONIA PLAS-SCNC: <10 UMOL/L (ref 11–51)
AMPHETAMINES UR QL SCN: NORMAL
ANION GAP SERPL CALCULATED.3IONS-SCNC: 11 MMOL/L (ref 7–15)
APPEARANCE CSF: CLEAR
APPEARANCE UR: CLEAR
AST SERPL W P-5'-P-CCNC: 25 U/L (ref 0–45)
BARBITURATES UR QL SCN: NORMAL
BASE EXCESS BLDV CALC-SCNC: 4.8 MMOL/L (ref -3–3)
BASOPHILS # BLD MANUAL: 0 10E3/UL (ref 0–0.2)
BASOPHILS NFR BLD MANUAL: 0 %
BENZODIAZ UR QL SCN: NORMAL
BILIRUB SERPL-MCNC: 0.8 MG/DL
BILIRUB UR QL STRIP: NEGATIVE
BUN SERPL-MCNC: 15.4 MG/DL (ref 8–23)
BZE UR QL SCN: NORMAL
CALCIUM SERPL-MCNC: 8.7 MG/DL (ref 8.8–10.4)
CANNABINOIDS UR QL SCN: NORMAL
CHLORIDE SERPL-SCNC: 93 MMOL/L (ref 98–107)
COLOR CSF: COLORLESS
COLOR UR AUTO: YELLOW
CREAT SERPL-MCNC: 1.04 MG/DL (ref 0.51–0.95)
EGFRCR SERPLBLD CKD-EPI 2021: 60 ML/MIN/1.73M2
EOSINOPHIL # BLD MANUAL: 0 10E3/UL (ref 0–0.7)
EOSINOPHIL NFR BLD MANUAL: 0 %
ERYTHROCYTE [DISTWIDTH] IN BLOOD BY AUTOMATED COUNT: 12.7 % (ref 10–15)
ETHANOL SERPL-MCNC: <0.01 G/DL
FENTANYL UR QL: NORMAL
FLUAV RNA SPEC QL NAA+PROBE: NEGATIVE
FLUBV RNA RESP QL NAA+PROBE: NEGATIVE
GLUCOSE BLDC GLUCOMTR-MCNC: 100 MG/DL (ref 70–99)
GLUCOSE CSF-MCNC: 59 MG/DL (ref 40–70)
GLUCOSE SERPL-MCNC: 102 MG/DL (ref 70–99)
GLUCOSE UR STRIP-MCNC: NEGATIVE MG/DL
GRAM STAIN RESULT: NORMAL
GRAM STAIN RESULT: NORMAL
HCO3 BLDV-SCNC: 31 MMOL/L (ref 21–28)
HCO3 SERPL-SCNC: 25 MMOL/L (ref 22–29)
HCT VFR BLD AUTO: 37.5 % (ref 35–47)
HGB BLD-MCNC: 12.8 G/DL (ref 11.7–15.7)
HGB UR QL STRIP: NEGATIVE
HOLD SPECIMEN: NORMAL
HYALINE CASTS: 1 /LPF
KETONES UR STRIP-MCNC: NEGATIVE MG/DL
LACTATE SERPL-SCNC: 1.4 MMOL/L (ref 0.7–2)
LEUKOCYTE ESTERASE UR QL STRIP: NEGATIVE
LIPASE SERPL-CCNC: 31 U/L (ref 13–60)
LYMPHOCYTES # BLD MANUAL: 0.6 10E3/UL (ref 0.8–5.3)
LYMPHOCYTES NFR BLD MANUAL: 15 %
MAGNESIUM SERPL-MCNC: 1.7 MG/DL (ref 1.7–2.3)
MCH RBC QN AUTO: 31.5 PG (ref 26.5–33)
MCHC RBC AUTO-ENTMCNC: 34.1 G/DL (ref 31.5–36.5)
MCV RBC AUTO: 92 FL (ref 78–100)
MONOCYTES # BLD MANUAL: 0.4 10E3/UL (ref 0–1.3)
MONOCYTES NFR BLD MANUAL: 10 %
MUCOUS THREADS #/AREA URNS LPF: PRESENT /LPF
NEUTROPHILS # BLD MANUAL: 3 10E3/UL (ref 1.6–8.3)
NEUTROPHILS NFR BLD MANUAL: 75 %
NITRATE UR QL: NEGATIVE
O2/TOTAL GAS SETTING VFR VENT: 21 %
OPIATES UR QL SCN: NORMAL
OXYHGB MFR BLDV: 29 % (ref 70–75)
PCO2 BLDV: 48 MM HG (ref 40–50)
PCP QUAL URINE (ROCHE): NORMAL
PH BLDV: 7.41 [PH] (ref 7.32–7.43)
PH UR STRIP: 6 [PH] (ref 5–9)
PLAT MORPH BLD: ABNORMAL
PLATELET # BLD AUTO: 83 10E3/UL (ref 150–450)
PO2 BLDV: 19 MM HG (ref 25–47)
POTASSIUM SERPL-SCNC: 4.6 MMOL/L (ref 3.4–5.3)
PROCALCITONIN SERPL IA-MCNC: 1.32 NG/ML
PROT CSF-MCNC: 27.9 MG/DL (ref 15–45)
PROT SERPL-MCNC: 6.4 G/DL (ref 6.4–8.3)
RBC # BLD AUTO: 4.06 10E6/UL (ref 3.8–5.2)
RBC # CSF MANUAL: 1 /UL (ref 0–2)
RBC MORPH BLD: ABNORMAL
RBC URINE: 4 /HPF
RSV RNA SPEC NAA+PROBE: NEGATIVE
SAO2 % BLDV: 29.3 % (ref 70–75)
SARS-COV-2 RNA RESP QL NAA+PROBE: NEGATIVE
SODIUM SERPL-SCNC: 129 MMOL/L (ref 135–145)
SP GR UR STRIP: 1.02 (ref 1–1.03)
TROPONIN T SERPL HS-MCNC: 14 NG/L
TROPONIN T SERPL HS-MCNC: 16 NG/L
TSH SERPL DL<=0.005 MIU/L-ACNC: 2.11 UIU/ML (ref 0.3–4.2)
TUBE # CSF: 4
UROBILINOGEN UR STRIP-MCNC: 8 MG/DL
VARIANT LYMPHS BLD QL SMEAR: PRESENT
WBC # BLD AUTO: 4 10E3/UL (ref 4–11)
WBC # CSF MANUAL: 3 /UL (ref 0–5)
WBC URINE: 1 /HPF

## 2025-06-05 PROCEDURE — 36415 COLL VENOUS BLD VENIPUNCTURE: CPT

## 2025-06-05 PROCEDURE — 71046 X-RAY EXAM CHEST 2 VIEWS: CPT

## 2025-06-05 PROCEDURE — 84443 ASSAY THYROID STIM HORMONE: CPT

## 2025-06-05 PROCEDURE — 87899 AGENT NOS ASSAY W/OPTIC: CPT | Performed by: STUDENT IN AN ORGANIZED HEALTH CARE EDUCATION/TRAINING PROGRAM

## 2025-06-05 PROCEDURE — 84157 ASSAY OF PROTEIN OTHER: CPT

## 2025-06-05 PROCEDURE — 85007 BL SMEAR W/DIFF WBC COUNT: CPT

## 2025-06-05 PROCEDURE — 87483 CNS DNA AMP PROBE TYPE 12-25: CPT | Performed by: STUDENT IN AN ORGANIZED HEALTH CARE EDUCATION/TRAINING PROGRAM

## 2025-06-05 PROCEDURE — 250N000013 HC RX MED GY IP 250 OP 250 PS 637

## 2025-06-05 PROCEDURE — 71046 X-RAY EXAM CHEST 2 VIEWS: CPT | Mod: 26 | Performed by: RADIOLOGY

## 2025-06-05 PROCEDURE — 89050 BODY FLUID CELL COUNT: CPT

## 2025-06-05 PROCEDURE — 86617 LYME DISEASE ANTIBODY: CPT

## 2025-06-05 PROCEDURE — 86618 LYME DISEASE ANTIBODY: CPT

## 2025-06-05 PROCEDURE — 82962 GLUCOSE BLOOD TEST: CPT

## 2025-06-05 PROCEDURE — 86617 LYME DISEASE ANTIBODY: CPT | Performed by: STUDENT IN AN ORGANIZED HEALTH CARE EDUCATION/TRAINING PROGRAM

## 2025-06-05 PROCEDURE — 99291 CRITICAL CARE FIRST HOUR: CPT | Mod: 25

## 2025-06-05 PROCEDURE — 93005 ELECTROCARDIOGRAM TRACING: CPT

## 2025-06-05 PROCEDURE — 82310 ASSAY OF CALCIUM: CPT

## 2025-06-05 PROCEDURE — 84145 PROCALCITONIN (PCT): CPT

## 2025-06-05 PROCEDURE — 83735 ASSAY OF MAGNESIUM: CPT

## 2025-06-05 PROCEDURE — 83605 ASSAY OF LACTIC ACID: CPT

## 2025-06-05 PROCEDURE — 96375 TX/PRO/DX INJ NEW DRUG ADDON: CPT

## 2025-06-05 PROCEDURE — 82077 ASSAY SPEC XCP UR&BREATH IA: CPT

## 2025-06-05 PROCEDURE — 84484 ASSAY OF TROPONIN QUANT: CPT

## 2025-06-05 PROCEDURE — 96365 THER/PROPH/DIAG IV INF INIT: CPT | Mod: XU

## 2025-06-05 PROCEDURE — 80307 DRUG TEST PRSMV CHEM ANLYZR: CPT

## 2025-06-05 PROCEDURE — 83690 ASSAY OF LIPASE: CPT

## 2025-06-05 PROCEDURE — 82805 BLOOD GASES W/O2 SATURATION: CPT

## 2025-06-05 PROCEDURE — 81001 URINALYSIS AUTO W/SCOPE: CPT

## 2025-06-05 PROCEDURE — 258N000003 HC RX IP 258 OP 636: Performed by: INTERNAL MEDICINE

## 2025-06-05 PROCEDURE — 82140 ASSAY OF AMMONIA: CPT

## 2025-06-05 PROCEDURE — 87070 CULTURE OTHR SPECIMN AEROBIC: CPT

## 2025-06-05 PROCEDURE — 70450 CT HEAD/BRAIN W/O DYE: CPT | Mod: 26 | Performed by: RADIOLOGY

## 2025-06-05 PROCEDURE — 85027 COMPLETE CBC AUTOMATED: CPT

## 2025-06-05 PROCEDURE — 70450 CT HEAD/BRAIN W/O DYE: CPT

## 2025-06-05 PROCEDURE — 258N000003 HC RX IP 258 OP 636

## 2025-06-05 PROCEDURE — 87637 SARSCOV2&INF A&B&RSV AMP PRB: CPT

## 2025-06-05 PROCEDURE — 87040 BLOOD CULTURE FOR BACTERIA: CPT | Performed by: PHYSICIAN ASSISTANT

## 2025-06-05 PROCEDURE — 36415 COLL VENOUS BLD VENIPUNCTURE: CPT | Performed by: PHYSICIAN ASSISTANT

## 2025-06-05 PROCEDURE — 96367 TX/PROPH/DG ADDL SEQ IV INF: CPT

## 2025-06-05 PROCEDURE — 87468 ANAPLSMA PHGCYTOPHLM AMP PRB: CPT

## 2025-06-05 PROCEDURE — 82945 GLUCOSE OTHER FLUID: CPT

## 2025-06-05 PROCEDURE — 200N000001 HC R&B ICU

## 2025-06-05 PROCEDURE — 250N000009 HC RX 250: Performed by: STUDENT IN AN ORGANIZED HEALTH CARE EDUCATION/TRAINING PROGRAM

## 2025-06-05 PROCEDURE — 99285 EMERGENCY DEPT VISIT HI MDM: CPT

## 2025-06-05 PROCEDURE — 96361 HYDRATE IV INFUSION ADD-ON: CPT

## 2025-06-05 PROCEDURE — 87040 BLOOD CULTURE FOR BACTERIA: CPT

## 2025-06-05 PROCEDURE — 93010 ELECTROCARDIOGRAM REPORT: CPT | Performed by: INTERNAL MEDICINE

## 2025-06-05 PROCEDURE — 250N000011 HC RX IP 250 OP 636

## 2025-06-05 RX ORDER — SODIUM CHLORIDE 9 MG/ML
INJECTION, SOLUTION INTRAVENOUS CONTINUOUS
Status: DISCONTINUED | OUTPATIENT
Start: 2025-06-05 | End: 2025-06-08 | Stop reason: HOSPADM

## 2025-06-05 RX ORDER — ACETAMINOPHEN 325 MG/1
650 TABLET ORAL ONCE
Status: COMPLETED | OUTPATIENT
Start: 2025-06-05 | End: 2025-06-05

## 2025-06-05 RX ORDER — VANCOMYCIN HYDROCHLORIDE 1 G/200ML
1000 INJECTION, SOLUTION INTRAVENOUS EVERY 12 HOURS
Status: DISCONTINUED | OUTPATIENT
Start: 2025-06-06 | End: 2025-06-06

## 2025-06-05 RX ORDER — CEFTRIAXONE 2 G/1
2 INJECTION, POWDER, FOR SOLUTION INTRAMUSCULAR; INTRAVENOUS ONCE
Status: COMPLETED | OUTPATIENT
Start: 2025-06-05 | End: 2025-06-05

## 2025-06-05 RX ORDER — ROPIVACAINE IN 0.9% SOD CHL/PF 0.1 %
.01-.2 PLASTIC BAG, INJECTION (ML) EPIDURAL CONTINUOUS
Status: DISCONTINUED | OUTPATIENT
Start: 2025-06-05 | End: 2025-06-06

## 2025-06-05 RX ORDER — NOREPINEPHRINE BITARTRATE 0.02 MG/ML
.01-.6 INJECTION, SOLUTION INTRAVENOUS CONTINUOUS
Status: DISCONTINUED | OUTPATIENT
Start: 2025-06-05 | End: 2025-06-05

## 2025-06-05 RX ORDER — CEFTRIAXONE 2 G/1
2 INJECTION, POWDER, FOR SOLUTION INTRAMUSCULAR; INTRAVENOUS EVERY 24 HOURS
Status: DISCONTINUED | OUTPATIENT
Start: 2025-06-06 | End: 2025-06-07

## 2025-06-05 RX ADMIN — SODIUM CHLORIDE: 0.9 INJECTION, SOLUTION INTRAVENOUS at 20:28

## 2025-06-05 RX ADMIN — SODIUM CHLORIDE 1000 ML: 0.9 INJECTION, SOLUTION INTRAVENOUS at 22:51

## 2025-06-05 RX ADMIN — CEFTRIAXONE 2 G: 2 INJECTION, POWDER, FOR SOLUTION INTRAMUSCULAR; INTRAVENOUS at 20:28

## 2025-06-05 RX ADMIN — NOREPINEPHRINE BITARTRATE 0.03 MCG/KG/MIN: 0.02 INJECTION, SOLUTION INTRAVENOUS at 22:52

## 2025-06-05 RX ADMIN — ACYCLOVIR SODIUM 550 MG: 50 INJECTION, SOLUTION INTRAVENOUS at 21:29

## 2025-06-05 RX ADMIN — SODIUM CHLORIDE 1000 ML: 0.9 INJECTION, SOLUTION INTRAVENOUS at 18:13

## 2025-06-05 RX ADMIN — Medication 1250 MG: at 23:19

## 2025-06-05 RX ADMIN — ACETAMINOPHEN 650 MG: 325 TABLET ORAL at 18:13

## 2025-06-05 ASSESSMENT — COPD QUESTIONNAIRES
COPD: 1
CAT_SEVERITY: MODERATE

## 2025-06-05 ASSESSMENT — COLUMBIA-SUICIDE SEVERITY RATING SCALE - C-SSRS
6. HAVE YOU EVER DONE ANYTHING, STARTED TO DO ANYTHING, OR PREPARED TO DO ANYTHING TO END YOUR LIFE?: NO
1. IN THE PAST MONTH, HAVE YOU WISHED YOU WERE DEAD OR WISHED YOU COULD GO TO SLEEP AND NOT WAKE UP?: NO
2. HAVE YOU ACTUALLY HAD ANY THOUGHTS OF KILLING YOURSELF IN THE PAST MONTH?: NO

## 2025-06-05 ASSESSMENT — LIFESTYLE VARIABLES: TOBACCO_USE: 1

## 2025-06-05 ASSESSMENT — ACTIVITIES OF DAILY LIVING (ADL)
ADLS_ACUITY_SCORE: 41

## 2025-06-05 NOTE — ED PROVIDER NOTES
History     Chief Complaint   Patient presents with    Altered Mental Status    Fever     The history is provided by the spouse and medical records.     Lisy Telles is a 64 year old female who presents to the ER today with her  and her daughter. History obtained from . Per , she has been acting confused over the past week. She hasn't been making sense, is disorientated. Has been laying around on the couch the past couple of days. This afternoon had a fever of 102.5. Has not been complaining of pain, but  said she was holding her abdomen this afternoon. No cough, respiratory concerns, no vomiting. Had some diarrhea recently. Possible tick exposure.  She last drank alcohol 3 weeks ago, went to detox. No drug use.   Typically patient is alert, orientated x4.     PMH alcohol withdrawal, alcoholism, COPD, hypertension, tobacco use, TBI, drug overdose, meningioma    Allergies:  No Known Allergies    Problem List:    Patient Active Problem List    Diagnosis Date Noted    FH: colon cancer 01/26/2021     Priority: Medium    PVC's (premature ventricular contractions) 09/29/2020     Priority: Medium    Moderate mitral regurgitation 09/29/2020     Priority: Medium    Hypokalemia 09/29/2020     Priority: Medium    Mixed hyperlipidemia 09/29/2020     Priority: Medium    Tobacco abuse counseling 09/29/2020     Priority: Medium    Bilateral enlargement of atria 09/29/2020     Priority: Medium    Irregular heart rate 09/29/2020     Priority: Medium    Acute respiratory failure with hypoxia and hypercapnia (H) 07/06/2020     Priority: Medium    Drug overdose, intentional self-harm, initial encounter (H) 07/06/2020     Priority: Medium    On mechanically assisted ventilation (H) 07/06/2020     Priority: Medium    S/p bilateral carpal tunnel release 04/16/2019     Priority: Medium    Right carpal tunnel syndrome 03/27/2019     Priority: Medium    Bilateral carpal tunnel syndrome 12/04/2018      Priority: Medium    De Quervain's disease (tenosynovitis) 12/04/2018     Priority: Medium    Nicotine dependence 07/03/2018     Priority: Medium    Restless legs syndrome (RLS) 06/29/2018     Priority: Medium    Anxiety 01/24/2018     Priority: Medium    COPD 01/24/2018     Priority: Medium    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) 11/30/2017     Priority: Medium    Alcoholism in recovery (H) 10/05/2017     Priority: Medium    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) 10/05/2017     Priority: Medium    Lung nodule < 6cm on CT 10/05/2017     Priority: Medium    Age-related osteoporosis without current pathological fracture 09/20/2017     Priority: Medium    Bronchiectasis with acute lower respiratory infection (H) 09/13/2017     Priority: Medium    Tobacco abuse 09/11/2017     Priority: Medium    Axillary nerve palsy 06/24/2016     Priority: Medium    Closed 4-part fracture of proximal end of left humerus 06/07/2016     Priority: Medium    Liver laceration, grade IV, with open wound into cavity 09/04/2015     Priority: Medium    Pneumothorax, closed, traumatic 09/04/2015     Priority: Medium    Spleen laceration 09/04/2015     Priority: Medium    Traumatic brain injury with loss of consciousness of 30 minutes or less (H) 09/04/2015     Priority: Medium    Hypomagnesemia 08/19/2015     Priority: Medium    Acute blood loss anemia 08/17/2015     Priority: Medium    Closed fracture of multiple ribs of right side, initial encounter 08/17/2015     Priority: Medium     Formatting of this note might be different from the original.  IMO Update      H/O gastric bypass 08/17/2015     Priority: Medium    Struck by horse, initial encounter 08/17/2015     Priority: Medium    Alcohol withdrawal (H) 04/08/2015     Priority: Medium    Essential hypertension 07/14/2014     Priority: Medium    Headache 09/03/2013     Priority: Medium     Formatting of this note might be different from the original.  Updated per  10/1/17 IMO import      Meningioma (H) 09/03/2013     Priority: Medium    Sprain of unspecified site of back 08/15/2012     Priority: Medium    Recurrent major depression in partial remission 10/13/2010     Priority: Medium        Past Medical History:    Past Medical History:   Diagnosis Date    Age-related osteoporosis with current pathological fracture with routine healing     Anxiety disorder     Appendicitis     Benign neoplasm of connective and other soft tissue, unspecified     Bilateral carpal tunnel syndrome     Brachial plexus disorders     Chronic obstructive pulmonary disease (H)     Essential (primary) hypertension     Major depressive disorder, single episode     Other displaced fracture of upper end of left humerus, subsequent encounter for fracture with routine healing     Other fracture of unspecified lower leg, initial encounter for closed fracture     Personal history of other medical treatment (CODE)     Pure hypercholesterolemia     Rectal polyp     Tobacco use     Uncomplicated alcohol dependence (H)        Past Surgical History:    Past Surgical History:   Procedure Laterality Date    BRONCHOSCOPY DIAGNOSTIC  08/07/2018    MAC infection    CLOSED REDUCTION ANKLE      12/26/07,Open reduction/internal fixation, right ankle fracture 12/26/07.    COLONOSCOPY      2007,screening for family history,  follow-up recommended in five years.    COLONOSCOPY      2007,Colonoscopy April 2007.  Follow-up recommended in five years.    COLONOSCOPY  02/18/2013 2/2013,Normal, hemorrhoids - follow up 5 years for family history of colon cnacer    COLONOSCOPY N/A 02/01/2021    F/U 2026 FH. hyperplastic   Surgeon: Marcel Dee MD;  Location: [facility] OR    HYSTERECTOMY TOTAL ABDOMINAL      04/26/2007,Laparoscopic supracervical hysterectomy and bilateral salpingo-oophorectomy for bleeding fibroid    LAPAROSCOPIC BYPASS GASTRIC      2007    LAPAROSCOPIC TUBAL LIGATION      1985    LOBECTOMY LUNG  08/07/2018    VATS -  LOBECTOMY UPPER LUNG, St, Dr Kohler    LUMPECTOMY BREAST      Lumpectomy of the right breast    RELEASE CARPAL TUNNEL Left 2019    Procedure: RELEASE CARPAL TUNNEL;  Surgeon: Andrew Sahni MD;  Location: [facility] OR    RELEASE CARPAL TUNNEL Right 2019    Procedure: Open Carpal Tunnel Release;  Surgeon: Andrew Sahni MD;  Location: [facility] OR    SKIN GRAFT, EACH ADDN 100SQCM  2007    Skin grafting post right ankle fracture       Family History:    Family History   Problem Relation Age of Onset    Heart Disease Father 36        Heart Disease,MI  - Quadruple bypass    Hypertension Father         Hypertension    Other - See Comments Father         Obesity    Colon Cancer Mother 40        Cancer-colon,surgical resection--remission    Hypertension Mother         Hypertension    Other - See Comments Mother         Obesity    Colon Cancer Maternal Grandmother         Cancer-colon    Thyroid Disease Sister         Thyroid Disease    Breast Cancer Paternal Aunt 60        Cancer-breast,       Social History:  Marital Status:   [2]  Social History     Tobacco Use    Smoking status: Heavy Smoker     Current packs/day: 1.10     Average packs/day: 1.1 packs/day for 44.0 years (48.4 ttl pk-yrs)     Types: Cigarettes    Smokeless tobacco: Never    Tobacco comments:     8 per day-has patches   Vaping Use    Vaping status: Never Used   Substance Use Topics    Alcohol use: Not Currently     Alcohol/week: 0.0 standard drinks of alcohol    Drug use: No        Medications:    albuterol (PROAIR HFA/PROVENTIL HFA/VENTOLIN HFA) 108 (90 Base) MCG/ACT inhaler  albuterol (PROVENTIL) (2.5 MG/3ML) 0.083% neb solution  alendronate (FOSAMAX) 70 MG tablet  diclofenac (VOLTAREN) 1 % topical gel  gabapentin (NEURONTIN) 400 MG capsule  hydrOXYzine (ATARAX) 10 MG tablet  sodium chloride (NEBUSAL) 3 % neb solution  SPIRIVA HANDIHALER 18 MCG inhaled capsule  traZODone (DESYREL) 150 MG tablet  vitamin D3 (CHOLECALCIFEROL) 50  "mcg (2000 units) tablet          Review of Systems   Unable to perform ROS: Mental status change   See HPI    Physical Exam   BP: 137/84  Pulse: 94  Temp: (!) 102.4  F (39.1  C)  Resp: 22  Height: 167.6 cm (5' 6\")  Weight: 56.2 kg (124 lb)  SpO2: 95 %      Physical Exam  Vitals and nursing note reviewed.   Constitutional:       General: She is not in acute distress.     Appearance: She is well-developed. She is not ill-appearing or toxic-appearing.   HENT:      Head: Normocephalic.      Right Ear: Tympanic membrane normal.      Left Ear: Tympanic membrane normal.      Nose: Nose normal.      Mouth/Throat:      Mouth: Mucous membranes are moist.   Eyes:      Extraocular Movements: Extraocular movements intact.      Pupils: Pupils are equal, round, and reactive to light.   Cardiovascular:      Rate and Rhythm: Normal rate and regular rhythm.      Heart sounds: Normal heart sounds.   Pulmonary:      Effort: Pulmonary effort is normal.      Comments: Diminished bilaterally  Abdominal:      General: Abdomen is flat.      Palpations: Abdomen is soft.      Tenderness: There is no abdominal tenderness. There is no right CVA tenderness, left CVA tenderness or guarding.   Musculoskeletal:         General: Normal range of motion.   Skin:     General: Skin is warm.      Capillary Refill: Capillary refill takes less than 2 seconds.      Findings: No rash.   Neurological:      Mental Status: She is alert. She is confused.   Psychiatric:         Cognition and Memory: Cognition is impaired. Memory is impaired.         ED Course            EKG Interpretation:      Interpreted by KELI Gentile CNP  Time reviewed: 9853  Symptoms at time of EKG: altered mental status   Rhythm: normal sinus   Rate: normal  Ectopy: none  Conduction: normal  ST Segments/ T Waves: No ST-T wave changes  Comparison to prior: similar  Clinical Impression: normal EKG  Pending  EKG over read by internal medicine       Results for orders placed or performed " during the hospital encounter of 06/05/25 (from the past 24 hours)   Glucose by meter   Result Value Ref Range    GLUCOSE BY METER POCT 100 (H) 70 - 99 mg/dL   [facility] Draw    Narrative    The following orders were created for panel order [facility] Draw.  Procedure                               Abnormality         Status                     ---------                               -----------         ------                     Extra Blue Top Tube[[id number]]                             Final result               Extra Red Top Tube[[id number]]                              Final result               Extra Green Top (Lithiu...[[id number]]                      Final result               Extra Purple Top Tube[[id number]]                           Final result               Extra Green Top (Lithiu...[[id number]]                      Final result                 Please view results for these tests on the individual orders.   Extra Blue Top Tube   Result Value Ref Range    Hold Specimen x    Extra Red Top Tube   Result Value Ref Range    Hold Specimen x    Extra Green Top (Lithium Heparin) Tube   Result Value Ref Range    Hold Specimen x    Extra Purple Top Tube   Result Value Ref Range    Hold Specimen x    Extra Green Top (Lithium Heparin) ON ICE   Result Value Ref Range    Hold Specimen x    CBC with platelets differential    Narrative    The following orders were created for panel order CBC with platelets differential.  Procedure                               Abnormality         Status                     ---------                               -----------         ------                     CBC with platelets and ...[[id number]]  Abnormal            Final result               RBC and Platelet Morpho...[[id number]]  Abnormal            Final result               Manual Differential[[id number]]         Abnormal            Final result                 Please view results for these tests on the individual orders.    Comprehensive metabolic panel   Result Value Ref Range    Sodium 129 (L) 135 - 145 mmol/L    Potassium 4.6 3.4 - 5.3 mmol/L    Carbon Dioxide (CO2) 25 22 - 29 mmol/L    Anion Gap 11 7 - 15 mmol/L    Urea Nitrogen 15.4 8.0 - 23.0 mg/dL    Creatinine 1.04 (H) 0.51 - 0.95 mg/dL    GFR Estimate 60 (L) >60 mL/min/1.73m2    Calcium 8.7 (L) 8.8 - 10.4 mg/dL    Chloride 93 (L) 98 - 107 mmol/L    Glucose 102 (H) 70 - 99 mg/dL    Alkaline Phosphatase 79 40 - 150 U/L    AST 25 0 - 45 U/L    ALT 13 0 - 50 U/L    Protein Total 6.4 6.4 - 8.3 g/dL    Albumin 3.5 3.5 - 5.2 g/dL    Bilirubin Total 0.8 <=1.2 mg/dL   Lipase   Result Value Ref Range    Lipase 31 13 - 60 U/L   Lactic acid whole blood with 1x repeat in 2 hr when >2   Result Value Ref Range    Lactic Acid, Initial 1.4 0.7 - 2.0 mmol/L   Troponin T, High Sensitivity   Result Value Ref Range    Troponin T, High Sensitivity 16 (H) <=14 ng/L   Magnesium   Result Value Ref Range    Magnesium 1.7 1.7 - 2.3 mg/dL   TSH Reflex [facility]   Result Value Ref Range    TSH 2.11 0.30 - 4.20 uIU/mL   Ethanol Level Blood   Result Value Ref Range    Ethanol Level Blood <0.01 <=0.01 g/dL   CBC with platelets and differential   Result Value Ref Range    WBC Count 4.0 4.0 - 11.0 10e3/uL    RBC Count 4.06 3.80 - 5.20 10e6/uL    Hemoglobin 12.8 11.7 - 15.7 g/dL    Hematocrit 37.5 35.0 - 47.0 %    MCV 92 78 - 100 fL    MCH 31.5 26.5 - 33.0 pg    MCHC 34.1 31.5 - 36.5 g/dL    RDW 12.7 10.0 - 15.0 %    Platelet Count 83 (L) 150 - 450 10e3/uL   RBC and Platelet Morphology   Result Value Ref Range    RBC Morphology Confirmed RBC Indices     Platelet Assessment  Automated Count Confirmed. Platelet morphology is normal.     Automated Count Confirmed. Platelet morphology is normal.    Reactive Lymphocytes Present (A) None Seen   Manual Differential   Result Value Ref Range    % Neutrophils 75 %    % Lymphocytes 15 %    % Monocytes 10 %    % Eosinophils 0 %    % Basophils 0 %    Absolute Neutrophils 3.0 1.6  - 8.3 10e3/uL    Absolute Lymphocytes 0.6 (L) 0.8 - 5.3 10e3/uL    Absolute Monocytes 0.4 0.0 - 1.3 10e3/uL    Absolute Eosinophils 0.0 0.0 - 0.7 10e3/uL    Absolute Basophils 0.0 0.0 - 0.2 10e3/uL   Procalcitonin   Result Value Ref Range    Procalcitonin 1.32 (H) <0.50 ng/mL   UA with Microscopic reflex to Culture    Specimen: Urine, Catheter   Result Value Ref Range    Color Urine Yellow Colorless, Straw, Light Yellow, Yellow    Appearance Urine Clear Clear    Glucose Urine Negative Negative mg/dL    Bilirubin Urine Negative Negative    Ketones Urine Negative Negative mg/dL    Specific Gravity Urine 1.023 1.000 - 1.030    Blood Urine Negative Negative    pH Urine 6.0 5.0 - 9.0    Protein Albumin Urine 50 (A) Negative mg/dL    Urobilinogen Urine 8.0 (A) Normal mg/dL    Nitrite Urine Negative Negative    Leukocyte Esterase Urine Negative Negative    Mucus Urine Present (A) None Seen /LPF    RBC Urine 4 (H) <=2 /HPF    WBC Urine 1 <=5 /HPF    Hyaline Casts Urine 1 <=2 /LPF    Narrative    Urine Culture not indicated   Urine Drug Screen    Narrative    The following orders were created for panel order Urine Drug Screen.  Procedure                               Abnormality         Status                     ---------                               -----------         ------                     Urine Drug Screen Panel[[id number]]     Normal              Final result                 Please view results for these tests on the individual orders.   Urine Drug Screen Panel   Result Value Ref Range    Amphetamines Urine Screen Negative Screen Negative    Barbituates Urine Screen Negative Screen Negative    Benzodiazepine Urine Screen Negative Screen Negative    Cannabinoids Urine Screen Negative Screen Negative    Cocaine Urine Screen Negative Screen Negative    Fentanyl Qual Urine Screen Negative Screen Negative    Opiates Urine Screen Negative Screen Negative    PCP Urine Screen Negative Screen Negative   XR Chest 2 Views     Narrative    EXAM: XR CHEST 2 VIEWS  LOCATION: [facility]  DATE: 6/5/2025    INDICATION: fever, altered mental status  COMPARISON: 7/3/2022      Impression    IMPRESSION: Heart size is normal. Lungs are hyperinflated with chronic appearing interstitial scarring or fibrosis. There is increased groundglass opacity over the right lower lobe, suspicious for developing infiltrate. No CHF, large effusions or   pneumothorax. No acute bony abnormalities.   CT Head w/o Contrast    Narrative    EXAM: CT HEAD W/O CONTRAST  LOCATION: [facility]  DATE: 6/5/2025    INDICATION: Altered mental status.  COMPARISON: 12/29/2022.  TECHNIQUE: Routine CT Head without IV contrast. Multiplanar reformats. Dose reduction techniques were used.    FINDINGS:  INTRACRANIAL CONTENTS: A partially calcified presumed meningioma overlying the parasagittal left frontal lobe appears similar in size compared with the prior brain MRI allowing for differences in modality. There is mild mass effect on the underlying left   frontal lobe without definite associated edema. No CT evidence of acute infarct. Normal parenchymal attenuation. Mild generalized volume loss. No hydrocephalus.     VISUALIZED ORBITS/SINUSES/MASTOIDS: No intraorbital abnormality. No paranasal sinus mucosal disease. No middle ear or mastoid effusion.    BONES/SOFT TISSUES: No acute abnormality.      Impression    IMPRESSION:  1.  No acute intracranial pathology.  2.  Grossly stable presumed meningioma overlying the parasagittal left frontal lobe.   Influenza A/B, RSV and SARS-CoV2 PCR (COVID-19) Nose    Specimen: Nose; Swab   Result Value Ref Range    Influenza A PCR Negative Negative    Influenza B PCR Negative Negative    RSV PCR Negative Negative    SARS CoV2 PCR Negative Negative    Narrative    Testing was performed using the Xpert Xpress CoV2/Flu/RSV Assay on the Cepheid GeneXpert Instrument. This test should be ordered for the detection  of SARS-CoV2, influenza, and RSV viruses in individuals with signs and symptoms of respiratory tract infection. This test is for in vitro diagnostic use under the [place] FDA for laboratories certified under CLIA to perform high or moderate complexity testing. This test has been [place] FDA cleared. A negative result does not rule out the presence of PCR inhibitors in the specimen or target RNA in concentration below the limit of detection for the assay. If only one viral target is positive but coinfection with multiple targets is suspected, the sample should be re-tested with another FDA cleared, approved, or authorized test, if coninfection would change clinical management. This test was validated by the [facility] [employer]. These laboratories are certified under the Clinical Laboratory Improvement Amendments of 1988 (CLIA-88) as qualified to perfom high complexity laboratory testing.   Ammonia (on ice)   Result Value Ref Range    Ammonia <10 (L) 11 - 51 umol/L   Blood gas venous   Result Value Ref Range    pH Venous 7.41 7.32 - 7.43    pCO2 Venous 48 40 - 50 mm Hg    pO2 Venous 19 (L) 25 - 47 mm Hg    Bicarbonate Venous 31 (H) 21 - 28 mmol/L    Base Excess/Deficit Venous 4.8 (H) -3.0 - 3.0 mmol/L    FIO2 21     Oxyhemoglobin Venous 29 (L) 70 - 75 %    O2 Sat, Venous 29.3 (L) 70.0 - 75.0 %    Narrative    In healthy individuals, oxyhemoglobin (O2Hb) and oxygen saturation (SO2) are approximately equal. In the presence of dyshemoglobins, oxyhemoglobin can be considerably lower than oxygen saturation.   Troponin T, High Sensitivity   Result Value Ref Range    Troponin T, High Sensitivity 14 <=14 ng/L       Medications   cefTRIAXone (ROCEPHIN) 2 g vial to attach to  ml bag for ADULTS or NS 50 ml bag for PEDS (2 g Intravenous $New Bag 6/5/25 2028)   acyclovir (ZOVIRAX) 550 mg in sodium chloride 0.9 % 111 mL intermittent infusion (550 mg Intravenous Handoff 6/5/25 2035)   sodium chloride 0.9 % infusion (  Intravenous $New Bag 6/5/25 2028)   vancomycin (VANCOCIN) 1,250 mg in 0.9% NaCl 250 mL intermittent infusion (has no administration in time range)   vancomycin (VANCOCIN) 1,000 mg in NaCl 0.9% 200 mL intermittent infusion (has no administration in time range)   sodium chloride 0.9% BOLUS 1,000 mL (0 mLs Intravenous Stopped 6/5/25 1921)   acetaminophen (TYLENOL) tablet 650 mg (650 mg Oral $Given 6/5/25 1813)       Assessments & Plan (with Medical Decision Making)  Lisy Telles is a 64 year old female who presents to the ER today with her  and her daughter. History obtained from . Per , she has been acting confused over the past week. She hasn't been making sense, is disorientated. Has been laying around on the couch the past couple of days. This afternoon had a fever of 102.5. Has not been complaining of pain, but  said she was holding her abdomen this afternoon. No cough, respiratory concerns, no vomiting. Had some diarrhea recently. Possible tick exposure.  She last drank alcohol 3 weeks ago, went to detox. No drug use.   Typically patient is alert, orientated x4.   PMH alcohol withdrawal, alcoholism, COPD, hypertension, tobacco use, TBI, drug overdose, meningioma  She is alert to herself, pleasant, nondistressed nontoxic.  Resting comfortably on the cot.  No focal neurological deficits upon exam.  She is denying pain.  Eating all of her extremities appropriately.  And following commands.  Lung sounds are clear and breathing is easy and unlabored.  Abdomen is soft and nontender.  No rashes, no meningeal signs  Differential diagnosis, broad,  includes but not limited to acute bacterial or viral infection, infection, sepsis, alcohol use alcohol withdrawal, electrolyte imbalance, encephalopathy, hypoglycemia, drug overdose, trauma, toxins, tumor, thyrotoxicosis, polypharmacy, psychiatric, seizure, CVA  CBC: WBC normal Thrombocytopenia: Platelets 83     CMP: acute hyponatremia: Sodium  129 (previous reviewed normal)  creatinine 1.04, GFR 60, chloride 93,  glucose 102. Magnesium 1.7.   Lipase 31.   Lactic acid normal, 1.4.  Procalcitonin: 1.32 TSH normal 2.11  Ethanol negative. UDS negative.   Tick borne illness: send out, pending.  Blood cultures pending.   Urinalysis: unremarkable.   EKG stable.   Head CT:   No acute intracranial pathology. Grossly stable presumed meningioma overlying the parasagittal left frontal lobe  Chest xray: Heart size is normal. Lungs are hyperinflated with chronic appearing interstitial scarring or fibrosis. There is increased groundglass opacity over the right lower lobe, suspicious for developing infiltrate. No CHF, large effusions or pneumothorax. No acute bony abnormalities  fever: fever unknown origin. Possible pneumonia on chest xray RLL, no hypoxia, cough, SOB. No rashes. No UTI. Abdominal exam benign. WBC normal. Lactic acid normal. Pro calcitonin elevated at 1.32. blood cultures pending. Discussed lumbar puncture for further evaluation, patient and family agreeable.   Altered mental status: Fever, unknown origin, CT head stable with meningioma which is being monitored and is stable compared to previous imaging, negative alcohol, UDS, Hyponatremia, which may be contributing, sodium is 129. Ammonia <10.   Meds: tylenol, IVF bolus, rocephin, vancomycin, acyclovir  9:00 PM end of shift report given to Dr Flores to resume care, please see his note      I have reviewed the nursing notes.    I have reviewed the findings, diagnosis, plan and need for follow up with the patient.    Medical Decision Making  The patient's presentation was of high complexity (altered mental status, fever).    The patient's evaluation involved:  review of external note(s) from 1 sources (see separate area of note for details)  ordering and/or review of 3+ test(s) in this encounter (see separate area of note for details)  discussion of management or test interpretation with another health  professional (see separate area of note for details)    The patient's management necessitated moderate risk (prescription drug management including medications given in the ED) and further care after sign-out to Dr. Flores (see their note for further management).        New Prescriptions    No medications on file       Final diagnoses:   Altered mental status   Fever   Thrombocytopenia   Hyponatremia       6/5/2025   [facility] AND [facility] Hospital[facility]atali[facility], APRN CNP  06/05/25 2052

## 2025-06-05 NOTE — ED TRIAGE NOTES
Pt arrives with concerns of altered mental status that has been off and on for the last week. She does think it is 2007 on arrival and seems to be having troubles with word finding. Spouse states she did have a fever of 102 earlier today. She did just get done with detox for alcohol.  /84   Pulse 94   Temp (!) 102.4  F (39.1  C) (Oral)   Resp 22   SpO2 95%     Triage Assessment (Adult)       Row Name 06/05/25 1730 06/05/25 1721       Triage Assessment    Airway WDL WDL WDL       Respiratory WDL    Respiratory WDL WDL WDL       Skin Circulation/Temperature WDL    Skin Circulation/Temperature WDL WDL WDL       Cardiac WDL    Cardiac WDL WDL WDL       Peripheral/Neurovascular WDL    Peripheral Neurovascular WDL WDL WDL       Cognitive/Neuro/Behavioral WDL    Cognitive/Neuro/Behavioral WDL X X    Level of Consciousness -- alert    Orientation -- oriented x 4

## 2025-06-06 PROBLEM — A41.9 SEPSIS, DUE TO UNSPECIFIED ORGANISM, UNSPECIFIED WHETHER ACUTE ORGAN DYSFUNCTION PRESENT (H): Status: RESOLVED | Noted: 2025-06-05 | Resolved: 2025-06-06

## 2025-06-06 PROBLEM — A41.9 SEPSIS (H): Status: ACTIVE | Noted: 2025-06-05

## 2025-06-06 LAB
ANION GAP SERPL CALCULATED.3IONS-SCNC: 9 MMOL/L (ref 7–15)
BUN SERPL-MCNC: 12.2 MG/DL (ref 8–23)
C GATTII+NEOFOR DNA CSF QL NAA+NON-PROBE: NEGATIVE
CALCIUM SERPL-MCNC: 7.6 MG/DL (ref 8.8–10.4)
CHLORIDE SERPL-SCNC: 102 MMOL/L (ref 98–107)
CMV DNA CSF QL NAA+NON-PROBE: NEGATIVE
CREAT SERPL-MCNC: 0.77 MG/DL (ref 0.51–0.95)
E COLI K1 AG CSF QL: NEGATIVE
EGFRCR SERPLBLD CKD-EPI 2021: 86 ML/MIN/1.73M2
ERYTHROCYTE [DISTWIDTH] IN BLOOD BY AUTOMATED COUNT: 12.8 % (ref 10–15)
EV RNA SPEC QL NAA+PROBE: NEGATIVE
GLUCOSE SERPL-MCNC: 71 MG/DL (ref 70–99)
GP B STREP DNA CSF QL NAA+NON-PROBE: NEGATIVE
HAEM INFLU DNA CSF QL NAA+NON-PROBE: NEGATIVE
HCO3 SERPL-SCNC: 23 MMOL/L (ref 22–29)
HCT VFR BLD AUTO: 35.1 % (ref 35–47)
HGB BLD-MCNC: 11.8 G/DL (ref 11.7–15.7)
HHV6 DNA CSF QL NAA+NON-PROBE: NEGATIVE
HSV1 DNA CSF QL NAA+NON-PROBE: NEGATIVE
HSV2 DNA CSF QL NAA+NON-PROBE: NEGATIVE
L MONOCYTOG DNA CSF QL NAA+NON-PROBE: NEGATIVE
MCH RBC QN AUTO: 31 PG (ref 26.5–33)
MCHC RBC AUTO-ENTMCNC: 33.6 G/DL (ref 31.5–36.5)
MCV RBC AUTO: 92 FL (ref 78–100)
MRSA DNA SPEC QL NAA+PROBE: NEGATIVE
N MEN DNA CSF QL NAA+NON-PROBE: NEGATIVE
PARECHOVIRUS A RNA CSF QL NAA+NON-PROBE: NEGATIVE
PLATELET # BLD AUTO: 74 10E3/UL (ref 150–450)
POTASSIUM SERPL-SCNC: 4.1 MMOL/L (ref 3.4–5.3)
RBC # BLD AUTO: 3.81 10E6/UL (ref 3.8–5.2)
S PNEUM AG SPEC QL: NEGATIVE
S PNEUM DNA CSF QL NAA+NON-PROBE: NEGATIVE
SA TARGET DNA: NEGATIVE
SODIUM SERPL-SCNC: 134 MMOL/L (ref 135–145)
SPECIMEN TYPE: NORMAL
VZV DNA CSF QL NAA+NON-PROBE: NEGATIVE
WBC # BLD AUTO: 3.4 10E3/UL (ref 4–11)

## 2025-06-06 PROCEDURE — 94640 AIRWAY INHALATION TREATMENT: CPT

## 2025-06-06 PROCEDURE — 250N000012 HC RX MED GY IP 250 OP 636 PS 637: Performed by: FAMILY MEDICINE

## 2025-06-06 PROCEDURE — 250N000013 HC RX MED GY IP 250 OP 250 PS 637: Performed by: FAMILY MEDICINE

## 2025-06-06 PROCEDURE — 250N000013 HC RX MED GY IP 250 OP 250 PS 637: Performed by: INTERNAL MEDICINE

## 2025-06-06 PROCEDURE — 120N000001 HC R&B MED SURG/OB

## 2025-06-06 PROCEDURE — 85014 HEMATOCRIT: CPT | Performed by: INTERNAL MEDICINE

## 2025-06-06 PROCEDURE — 999N000157 HC STATISTIC RCP TIME EA 10 MIN

## 2025-06-06 PROCEDURE — 250N000011 HC RX IP 250 OP 636: Performed by: INTERNAL MEDICINE

## 2025-06-06 PROCEDURE — 99221 1ST HOSP IP/OBS SF/LOW 40: CPT | Performed by: INTERNAL MEDICINE

## 2025-06-06 PROCEDURE — 87070 CULTURE OTHR SPECIMN AEROBIC: CPT | Performed by: FAMILY MEDICINE

## 2025-06-06 PROCEDURE — 250N000009 HC RX 250: Performed by: FAMILY MEDICINE

## 2025-06-06 PROCEDURE — 250N000011 HC RX IP 250 OP 636: Performed by: FAMILY MEDICINE

## 2025-06-06 PROCEDURE — 87641 MR-STAPH DNA AMP PROBE: CPT | Performed by: FAMILY MEDICINE

## 2025-06-06 PROCEDURE — 94640 AIRWAY INHALATION TREATMENT: CPT | Mod: 76

## 2025-06-06 PROCEDURE — 80048 BASIC METABOLIC PNL TOTAL CA: CPT | Performed by: INTERNAL MEDICINE

## 2025-06-06 PROCEDURE — 36415 COLL VENOUS BLD VENIPUNCTURE: CPT | Performed by: INTERNAL MEDICINE

## 2025-06-06 RX ORDER — ONDANSETRON 4 MG/1
4 TABLET, ORALLY DISINTEGRATING ORAL EVERY 6 HOURS PRN
Status: DISCONTINUED | OUTPATIENT
Start: 2025-06-06 | End: 2025-06-08 | Stop reason: HOSPADM

## 2025-06-06 RX ORDER — ACETAMINOPHEN 325 MG/1
650 TABLET ORAL EVERY 4 HOURS PRN
Status: DISCONTINUED | OUTPATIENT
Start: 2025-06-06 | End: 2025-06-08 | Stop reason: HOSPADM

## 2025-06-06 RX ORDER — FOLIC ACID 1 MG/1
1 TABLET ORAL DAILY
Status: DISCONTINUED | OUTPATIENT
Start: 2025-06-06 | End: 2025-06-08 | Stop reason: HOSPADM

## 2025-06-06 RX ORDER — PREDNISONE 20 MG/1
40 TABLET ORAL DAILY
Status: DISCONTINUED | OUTPATIENT
Start: 2025-06-06 | End: 2025-06-07

## 2025-06-06 RX ORDER — GABAPENTIN 400 MG/1
800 CAPSULE ORAL 3 TIMES DAILY
Status: DISCONTINUED | OUTPATIENT
Start: 2025-06-06 | End: 2025-06-08 | Stop reason: HOSPADM

## 2025-06-06 RX ORDER — DULOXETIN HYDROCHLORIDE 30 MG/1
60 CAPSULE, DELAYED RELEASE ORAL 2 TIMES DAILY
Status: DISCONTINUED | OUTPATIENT
Start: 2025-06-06 | End: 2025-06-08 | Stop reason: HOSPADM

## 2025-06-06 RX ORDER — VANCOMYCIN HYDROCHLORIDE 1 G/200ML
1000 INJECTION, SOLUTION INTRAVENOUS EVERY 12 HOURS
Status: CANCELLED | OUTPATIENT
Start: 2025-06-06

## 2025-06-06 RX ORDER — AZITHROMYCIN 250 MG/1
500 TABLET, FILM COATED ORAL DAILY
Status: DISCONTINUED | OUTPATIENT
Start: 2025-06-06 | End: 2025-06-06

## 2025-06-06 RX ORDER — BUDESONIDE AND FORMOTEROL FUMARATE DIHYDRATE 80; 4.5 UG/1; UG/1
2 AEROSOL RESPIRATORY (INHALATION) 2 TIMES DAILY
COMMUNITY

## 2025-06-06 RX ORDER — BUPROPION HYDROCHLORIDE 150 MG/1
300 TABLET ORAL DAILY
Status: DISCONTINUED | OUTPATIENT
Start: 2025-06-06 | End: 2025-06-08 | Stop reason: HOSPADM

## 2025-06-06 RX ORDER — HYDRALAZINE HYDROCHLORIDE 20 MG/ML
10 INJECTION INTRAMUSCULAR; INTRAVENOUS EVERY 4 HOURS PRN
Status: DISCONTINUED | OUTPATIENT
Start: 2025-06-06 | End: 2025-06-08 | Stop reason: HOSPADM

## 2025-06-06 RX ORDER — DOXYCYCLINE 100 MG/1
100 CAPSULE ORAL EVERY 12 HOURS SCHEDULED
Status: DISCONTINUED | OUTPATIENT
Start: 2025-06-06 | End: 2025-06-08 | Stop reason: HOSPADM

## 2025-06-06 RX ORDER — GABAPENTIN 400 MG/1
400 CAPSULE ORAL EVERY MORNING
Status: DISCONTINUED | OUTPATIENT
Start: 2025-06-06 | End: 2025-06-06

## 2025-06-06 RX ORDER — TRAZODONE HYDROCHLORIDE 50 MG/1
150 TABLET ORAL AT BEDTIME
Status: DISCONTINUED | OUTPATIENT
Start: 2025-06-06 | End: 2025-06-08 | Stop reason: HOSPADM

## 2025-06-06 RX ORDER — AMOXICILLIN 250 MG
2 CAPSULE ORAL 2 TIMES DAILY PRN
Status: DISCONTINUED | OUTPATIENT
Start: 2025-06-06 | End: 2025-06-08 | Stop reason: HOSPADM

## 2025-06-06 RX ORDER — THIAMINE HYDROCHLORIDE 100 MG/ML
100 INJECTION, SOLUTION INTRAMUSCULAR; INTRAVENOUS DAILY
Status: DISCONTINUED | OUTPATIENT
Start: 2025-06-06 | End: 2025-06-08 | Stop reason: HOSPADM

## 2025-06-06 RX ORDER — HYDRALAZINE HYDROCHLORIDE 10 MG/1
10 TABLET, FILM COATED ORAL EVERY 4 HOURS PRN
Status: DISCONTINUED | OUTPATIENT
Start: 2025-06-06 | End: 2025-06-08 | Stop reason: HOSPADM

## 2025-06-06 RX ORDER — LIDOCAINE 40 MG/G
CREAM TOPICAL
Status: DISCONTINUED | OUTPATIENT
Start: 2025-06-06 | End: 2025-06-08 | Stop reason: HOSPADM

## 2025-06-06 RX ORDER — GABAPENTIN 400 MG/1
800 CAPSULE ORAL AT BEDTIME
Status: DISCONTINUED | OUTPATIENT
Start: 2025-06-06 | End: 2025-06-06

## 2025-06-06 RX ORDER — ONDANSETRON 2 MG/ML
4 INJECTION INTRAMUSCULAR; INTRAVENOUS EVERY 6 HOURS PRN
Status: DISCONTINUED | OUTPATIENT
Start: 2025-06-06 | End: 2025-06-08 | Stop reason: HOSPADM

## 2025-06-06 RX ORDER — AMOXICILLIN 250 MG
1 CAPSULE ORAL 2 TIMES DAILY PRN
Status: DISCONTINUED | OUTPATIENT
Start: 2025-06-06 | End: 2025-06-08 | Stop reason: HOSPADM

## 2025-06-06 RX ORDER — ENOXAPARIN SODIUM 100 MG/ML
40 INJECTION SUBCUTANEOUS EVERY 24 HOURS
Status: DISCONTINUED | OUTPATIENT
Start: 2025-06-06 | End: 2025-06-08 | Stop reason: HOSPADM

## 2025-06-06 RX ORDER — GABAPENTIN 400 MG/1
800 CAPSULE ORAL 3 TIMES DAILY
COMMUNITY

## 2025-06-06 RX ORDER — BUPROPION HYDROCHLORIDE 300 MG/1
300 TABLET ORAL DAILY
COMMUNITY

## 2025-06-06 RX ORDER — CALCIUM CARBONATE 500 MG/1
1000 TABLET, CHEWABLE ORAL 4 TIMES DAILY PRN
Status: DISCONTINUED | OUTPATIENT
Start: 2025-06-06 | End: 2025-06-08 | Stop reason: HOSPADM

## 2025-06-06 RX ORDER — IPRATROPIUM BROMIDE AND ALBUTEROL SULFATE 2.5; .5 MG/3ML; MG/3ML
3 SOLUTION RESPIRATORY (INHALATION)
Status: DISCONTINUED | OUTPATIENT
Start: 2025-06-06 | End: 2025-06-08 | Stop reason: HOSPADM

## 2025-06-06 RX ORDER — ACETAMINOPHEN 650 MG/1
650 SUPPOSITORY RECTAL EVERY 4 HOURS PRN
Status: DISCONTINUED | OUTPATIENT
Start: 2025-06-06 | End: 2025-06-08 | Stop reason: HOSPADM

## 2025-06-06 RX ADMIN — ONDANSETRON 4 MG: 2 INJECTION INTRAMUSCULAR; INTRAVENOUS at 21:01

## 2025-06-06 RX ADMIN — ENOXAPARIN SODIUM 40 MG: 40 INJECTION SUBCUTANEOUS at 22:32

## 2025-06-06 RX ADMIN — AZITHROMYCIN DIHYDRATE 500 MG: 250 TABLET, FILM COATED ORAL at 11:33

## 2025-06-06 RX ADMIN — FOLIC ACID 1 MG: 1 TABLET ORAL at 08:13

## 2025-06-06 RX ADMIN — ACETAMINOPHEN 650 MG: 325 TABLET ORAL at 06:20

## 2025-06-06 RX ADMIN — PREDNISONE 40 MG: 20 TABLET ORAL at 10:35

## 2025-06-06 RX ADMIN — IPRATROPIUM BROMIDE AND ALBUTEROL SULFATE 3 ML: .5; 3 SOLUTION RESPIRATORY (INHALATION) at 10:51

## 2025-06-06 RX ADMIN — CEFTRIAXONE 2 G: 2 INJECTION, POWDER, FOR SOLUTION INTRAMUSCULAR; INTRAVENOUS at 21:06

## 2025-06-06 RX ADMIN — GABAPENTIN 800 MG: 400 CAPSULE ORAL at 22:30

## 2025-06-06 RX ADMIN — DULOXETINE 60 MG: 30 CAPSULE, DELAYED RELEASE ORAL at 22:30

## 2025-06-06 RX ADMIN — NICOTINE POLACRILEX 2 MG: 2 GUM, CHEWING BUCCAL at 18:55

## 2025-06-06 RX ADMIN — DULOXETINE 60 MG: 30 CAPSULE, DELAYED RELEASE ORAL at 12:56

## 2025-06-06 RX ADMIN — BUPROPION HYDROCHLORIDE 300 MG: 150 TABLET, EXTENDED RELEASE ORAL at 10:35

## 2025-06-06 RX ADMIN — THIAMINE HYDROCHLORIDE 100 MG: 100 INJECTION, SOLUTION INTRAMUSCULAR; INTRAVENOUS at 01:02

## 2025-06-06 RX ADMIN — TRAZODONE HYDROCHLORIDE 50 MG: 50 TABLET ORAL at 22:31

## 2025-06-06 RX ADMIN — GABAPENTIN 800 MG: 400 CAPSULE ORAL at 13:26

## 2025-06-06 RX ADMIN — IPRATROPIUM BROMIDE AND ALBUTEROL SULFATE 3 ML: .5; 3 SOLUTION RESPIRATORY (INHALATION) at 20:05

## 2025-06-06 RX ADMIN — IPRATROPIUM BROMIDE AND ALBUTEROL SULFATE 3 ML: .5; 3 SOLUTION RESPIRATORY (INHALATION) at 16:36

## 2025-06-06 RX ADMIN — DOXYCYCLINE 100 MG: 100 CAPSULE ORAL at 20:01

## 2025-06-06 ASSESSMENT — ACTIVITIES OF DAILY LIVING (ADL)
ADLS_ACUITY_SCORE: 26
ADLS_ACUITY_SCORE: 30
ADLS_ACUITY_SCORE: 26
ADLS_ACUITY_SCORE: 26
ADLS_ACUITY_SCORE: 30
ADLS_ACUITY_SCORE: 30
ADLS_ACUITY_SCORE: 26
ADLS_ACUITY_SCORE: 30
ADLS_ACUITY_SCORE: 26

## 2025-06-06 NOTE — ED PROVIDER NOTES
Transfer of care from previous shift provider:. Fever and confusion. Comprehensive workup unremarkable including head imaging. Concern for CNS infection. LP currently happening. Given broad spectrum antibiotics and acyclovir. Tick borne labs pending. Will need admission.    ED Course as of 06/05/25 2208   Thu Jun 05, 202505, 2025 2204 Admission accepted by Dr Meesala.     Assessment and Plan:  Final diagnoses:   Altered mental status   Fever   Thrombocytopenia   Hyponatremia      Initial CSF results do not appear infected. Some softer pressures so adding 500 ml fluid bolus. Admission accepted by Dr Meesala. Disposition: [facility] med surg admission      Raghavendra Flores MD  06/05/25 2208

## 2025-06-06 NOTE — ASSESSMENT & PLAN NOTE
-- Likely secondary hydration  --Follow-up urine study, serum osmolality, urine sodium  --She is currently on IV fluids.

## 2025-06-06 NOTE — PROGRESS NOTES
Preventing Falls in the Hospital (02:42)  Your health professional recommends that you watch this short online health video.  Find out why you're at risk for falling in the hospital and how to prevent falls.   Purpose: Understand what increases a person's risk of falling in the hospital and how to prevent falls.  Goal: Understand what increases a person's risk of falling in the hospital and how to prevent falls.    Watch: Scan the QR code or visit the link to view video       https://hwi.se/[email or web address]  Current as of: July 17, 2023  Content Version: 14.2 2024 [facility] "[employer].   Care instructions adapted under license by your healthcare professional. If you have questions about a medical condition or this instruction, always ask your healthcare professional. [employer] disclaims any warranty or liability for your use of this information.  Preventing Falls in the Hospital

## 2025-06-06 NOTE — ANESTHESIA CARE TRANSFER NOTE
Patient: Lisy Telles    Procedure: * No procedures listed *       Diagnosis: * No pre-op diagnosis entered *  Diagnosis Additional Information: No value filed.    Anesthesia Type:   No value filed.     Note:    Oropharynx: oropharynx clear of all foreign objects and spontaneously breathing  Level of Consciousness: awake  Oxygen Supplementation: room air    Independent Airway: airway patency satisfactory and stable  Dentition: dentition unchanged  Vital Signs Stable: post-procedure vital signs reviewed and stable  Report to RN Given: handoff report given  Patient transferred to: Emergency Department    Handoff Report: Identifed the Patient, Identified the Reponsible Provider, Reviewed the pertinent medical history, Discussed the surgical course, Reviewed Intra-OP anesthesia mangement and issues during anesthesia, Set expectations for post-procedure period and Allowed opportunity for questions and acknowledgement of understanding      Vitals:  Vitals Value Taken Time   BP 82/71 06/05/25 21:45   Temp     Pulse 73 06/05/25 21:46   Resp 15 06/05/25 21:46   SpO2 91 % 06/05/25 21:46   Vitals shown include unfiled device data.    Electronically Signed By: KELI Yousif CRNA  June 5, 2025  9:47 PM

## 2025-06-06 NOTE — ASSESSMENT & PLAN NOTE
-- Likely secondary to pneumonia.   Chest x-ray showed possible right lower lobe pneumonia.  UA showed no signs of infection.  --LP is done because she had some confusion.  CSF showed no growth so far  --She is currently on cefepime and vancomycin.

## 2025-06-06 NOTE — PHARMACY
Pharmacy - Transfer Medication Reconciliation     The patient's transfer medication orders have been compared to the medication administration record and to the Prior to Admissions Medications list - any noted discrepancies were resolved with the MD. Patient from ICU to med/surg    Thank you. Pharmacy will continue to monitor.     Emerita Boone RPH ....................  6/6/2025   4:28 PM

## 2025-06-06 NOTE — PHARMACY-VANCOMYCIN DOSING SERVICE
Pharmacy Vancomycin Initial Note  Date of Service 2025  Patient's  1960  64 year old, female    Indication: Meningitis    Current estimated CrCl = Estimated Creatinine Clearance: 64 mL/min (based on SCr of 0.77 mg/dL).    Creatinine for last 3 days  2025:  5:35 PM Creatinine 1.04 mg/dL  2025:  6:15 AM Creatinine 0.77 mg/dL    Recent Vancomycin Level(s) for last 3 days  No results found for requested labs within last 3 days.      Vancomycin IV Administrations (past 72 hours)                     vancomycin (VANCOCIN) 1,250 mg in 0.9% NaCl 250 mL intermittent infusion (mg) 1,250 mg New Bag 25 2319                    Nephrotoxins and other renal medications (From now, onward)      Start     Dose/Rate Route Frequency Ordered Stop    25 1000  vancomycin (VANCOCIN) 1,000 mg in NaCl 0.9% 200 mL intermittent infusion         1,000 mg  over 60 Minutes Intravenous EVERY 12 HOURS 25 2250  norepinephrine (LEVOPHED) 4 mg in  mL PERIPHERAL infusion         0.01-0.2 mcg/kg/min × 56.2 kg  2.1-42.2 mL/hr  Intravenous CONTINUOUS 25 2247              Contrast Orders - past 72 hours (72h ago, onward)      None             Plan:  Start vancomycin  1000 mg IV q12h. (After 1250 mg load @ 2200 25)  Using method 2 (manual dose calculation) and 20 mg/kg q12h  Vancomycin monitoring method: Trough (Method 2 = manual dose calculation)  Vancomycin therapeutic monitoring goal: 15-20 mg/L  Pharmacy will check vancomycin levels as appropriate in 1-3 Days.    Serum creatinine levels will be ordered daily for the first week of therapy and at least twice weekly for subsequent weeks.      Emerita Boone RP

## 2025-06-06 NOTE — ANESTHESIA PREPROCEDURE EVALUATION
Anesthesia Pre-Procedure Evaluation    Patient: Lisy Telles   MRN: [id number] : 1960          Procedure :          Past Medical History:   Diagnosis Date    Age-related osteoporosis with current pathological fracture with routine healing     2017    Anxiety disorder     No Comments Provided    Appendicitis     No Comments Provided    Benign neoplasm of connective and other soft tissue, unspecified     Right Breast    Bilateral carpal tunnel syndrome     Brachial plexus disorders     2016    Chronic obstructive pulmonary disease (H)     No Comments Provided    Essential (primary) hypertension     No Comments Provided    Major depressive disorder, single episode     Depression with insomnia and tiredness    Other displaced fracture of upper end of left humerus, subsequent encounter for fracture with routine healing     2016    Other fracture of unspecified lower leg, initial encounter for closed fracture     Fracture, right ankle    Personal history of other medical treatment (CODE)      4, Para 4-0-0-4    Pure hypercholesterolemia     No Comments Provided    Rectal polyp     Hyperplastic polyp times three noted on colonoscopy 2007    Tobacco use     No Comments Provided    Uncomplicated alcohol dependence (H)     No Comments Provided      Past Surgical History:   Procedure Laterality Date    BRONCHOSCOPY DIAGNOSTIC  2018    MAC infection    CLOSED REDUCTION ANKLE      07,Open reduction/internal fixation, right ankle fracture 07.    COLONOSCOPY      ,screening for family history,  follow-up recommended in five years.    COLONOSCOPY      ,Colonoscopy 2007.  Follow-up recommended in five years.    COLONOSCOPY  2013,Normal, hemorrhoids - follow up 5 years for family history of colon cnacer    COLONOSCOPY N/A 2021    F/U  FH. hyperplastic   Surgeon: Marcel Dee MD;  Location: [facility] OR    HYSTERECTOMY TOTAL ABDOMINAL       2007,Laparoscopic supracervical hysterectomy and bilateral salpingo-oophorectomy for bleeding fibroid    LAPAROSCOPIC BYPASS GASTRIC      2007    LAPAROSCOPIC TUBAL LIGATION      1985    LOBECTOMY LUNG  2018    VATS - LOBECTOMY UPPER LUNG, Maciel, Dr Kohler    LUMPECTOMY BREAST      Lumpectomy of the right breast    RELEASE CARPAL TUNNEL Left 2019    Procedure: RELEASE CARPAL TUNNEL;  Surgeon: Andrew Sahni MD;  Location: [facility] OR    RELEASE CARPAL TUNNEL Right 2019    Procedure: Open Carpal Tunnel Release;  Surgeon: Andrew Sahni MD;  Location: [facility] OR    SKIN GRAFT, EACH ADDN [id number]  2007    Skin grafting post right ankle fracture      No Known Allergies   Social History     Tobacco Use    Smoking status: Heavy Smoker     Current packs/day: 1.10     Average packs/day: 1.1 packs/day for 44.0 years (48.4 ttl pk-yrs)     Types: Cigarettes    Smokeless tobacco: Never    Tobacco comments:     8 per day-has patches   Substance Use Topics    Alcohol use: Not Currently     Alcohol/week: 0.0 standard drinks of alcohol      Wt Readings from Last 1 Encounters:   25 56.2 kg (124 lb)        Anesthesia Evaluation   Pt has had prior anesthetic. Type: General and Regional.        ROS/MED HX  ENT/Pulmonary: Comment: Smoking hx since teens    (+)                tobacco use, Current use, 0.8 packs/day,  patient smoked within 24 hours,     moderate,  COPD,              Neurologic:       Cardiovascular:     (+) Dyslipidemia hypertension- -   -  - -                                    Echo: Date: Results:  Result Text  [id number]  [id number]  [id number]  [id number]^[email or web address]^VERONICA^WGAY     [facility]  [address] [place] [address]     Name: PASTORA CAMPOS  MRN: [id number]  : 1960  Study Date: 06/10/2021 10:35 AM  Age: 60 yrs  Gender: Female  Patient Location: [facility]  Reason For Study: Moderate mitral regurgitation  Ordering Physician: VERONICA DEL TORO  GAY  Referring Physician: VERONICA DEL TORO  Performed By: Karine Nevarez RD, T     BSA: 1.6 m2  Height: 63 in  Weight: 127 lb  HR: 76  BP: 130/86 mmHg  ______________________________________________________________________________  Procedure  Echocardiogram with two-dimensional, color and spectral Doppler performed.  ______________________________________________________________________________  Interpretation Summary  Borderline (EF 50-55%) reduced left ventricular function is present.  Right ventricular function, chamber size, wall motion, and thickness are  normal.  Moderate mitral insufficiency is present.  Pulmonary artery systolic pressure is normal.  Mild dilatation of the aorta is present.  IVC diameter <2.1 cm collapsing >50% with sniff suggests a normal RA pressure  of 3 mmHg.  No pericardial effusion is present.  There has been no change.  ______________________________________________________________________________  Left Ventricle  Left ventricular wall thickness is normal. Mild left ventricular dilation is  present. Left ventricular diastolic function is indeterminate. Borderline (EF  50-55%) reduced left ventricular function is present. No regional wall motion  abnormalities are seen.     Right Ventricle  Right ventricular function, chamber size, wall motion, and thickness are  normal.     Atria  Mild to moderate biatrial enlargement is present.     Mitral Valve  Moderate mitral insufficiency is present.     Aortic Valve  Aortic valve is normal in structure and function.     Tricuspid Valve  Mild tricuspid insufficiency is present. The right ventricular systolic  pressure is approximated at 23.8 mmHg plus the right atrial pressure.  Pulmonary artery systolic pressure is normal.     Pulmonic Valve  The pulmonic valve is normal.     Vessels  Mild dilatation of the aorta is present. Ascending aorta 3.6 cm. IVC diameter  <2.1 cm collapsing >50% with sniff suggests a normal RA pressure of 3 mmHg.      Pericardium  No pericardial effusion is present.     Compared to Previous Study  There has been no change.     ______________________________________________________________________________  MMode/2D Measurements & Calculations  IVSd: 0.80 cm  LVIDd: 5.8 cm  LVIDs: 4.7 cm  LVPWd: 0.81 cm  FS: 19.6 %  LV mass(C)d: 178.0 grams  LV mass(C)dI: 111.6 grams/m2     Ao root diam: 3.4 cm  asc Aorta Diam: 3.6 cm  LVOT diam: 2.2 cm  LVOT area: 3.8 cm2  LA Volume (BP): 50.8 ml  LA Volume Index (BP): 31.9 ml/m2  RWT: 0.28     Doppler Measurements & Calculations  MV E max chago: 51.0 cm/sec  MV A max chago: 50.1 cm/sec  MV E/A: 1.0  MV dec slope: 324.0 cm/sec2  MV dec time: 0.16 sec     Ao V2 max: 99.1 cm/sec  Ao max P.0 mmHg  Ao V2 mean: 70.5 cm/sec  Ao mean P.0 mmHg  Ao V2 VTI: 17.8 cm  EUN(I,D): 2.5 cm2  EUN(V,D): 2.5 cm2  LV V1 max P.6 mmHg  LV V1 max: 64.2 cm/sec  LV V1 VTI: 11.9 cm  SV(LVOT): 45.2 ml  SI(LVOT): 28.4 ml/m2  TR max chago: 243.3 cm/sec  TR max P.8 mmHg  AV Chago Ratio (DI): 0.65  EUN Index (cm2/m2): 1.6  E/E' av.7  Lateral E/e': 7.3  Medial E/e': 12.0     ______________________________________________________________________________  Report approved by: Hugo Oliveira 06/10/2021 12:15 PM       Stress Test:  Date: Results:    ECG Reviewed:  Date: Results:    Cath:  Date: Results:      METS/Exercise Tolerance: 3 - Able to walk 1-2 blocks without stopping    Hematologic:       Musculoskeletal:       GI/Hepatic: Comment: S/P gastric bypass    (+)             liver disease,       Renal/Genitourinary:       Endo:       Psychiatric/Substance Use:     (+)   alcohol abuse      Infectious Disease:     (+) Recent Fever,           Malignancy:       Other:              Physical Exam  Airway  Mallampati: II  TM distance: >3 FB  Neck ROM: full  Mouth opening: >= 4 cm    Cardiovascular - normal exam   Dental  dental implants, bridges or caps present   Pulmonary - normal exam      Neurological   She  "appears awake and alert.    Other Findings       OUTSIDE LABS:  CBC:   Lab Results   Component Value Date    WBC 4.0 06/05/2025    WBC 4.0 07/03/2022    HGB 12.8 06/05/2025    HGB 12.3 07/03/2022    HCT 37.5 06/05/2025    HCT 38.1 07/03/2022    PLT 83 (L) 06/05/2025     07/03/2022     BMP:   Lab Results   Component Value Date     (L) 06/05/2025     02/11/2022    POTASSIUM 4.6 06/05/2025    POTASSIUM 4.4 02/11/2022    CHLORIDE 93 (L) 06/05/2025    CHLORIDE 105 02/11/2022    CO2 25 06/05/2025    CO2 30 02/11/2022    BUN 15.4 06/05/2025    BUN 11 02/11/2022    CR 1.04 (H) 06/05/2025    CR 0.92 02/11/2022     (H) 06/05/2025     (H) 06/05/2025     COAGS:   Lab Results   Component Value Date    INR 0.9 09/11/2017     POC: No results found for: \"BGM\", \"HCG\", \"HCGS\"  HEPATIC:   Lab Results   Component Value Date    ALBUMIN 3.5 06/05/2025    PROTTOTAL 6.4 06/05/2025    ALT 13 06/05/2025    AST 25 06/05/2025    ALKPHOS 79 06/05/2025    BILITOTAL 0.8 06/05/2025    KATERYNA <10 (L) 06/05/2025     OTHER:   Lab Results   Component Value Date    LACT 1.4 06/05/2025    SYED 8.7 (L) 06/05/2025    MAG 1.7 06/05/2025    LIPASE 31 06/05/2025    TSH 2.11 06/05/2025    CRP 1.3 10/02/2020    SED 14 10/02/2020       Anesthesia Plan    ASA Status:  3       Anesthesia Type:.  Airway: natural airway.   Techniques and Equipment:       - Monitoring Plan: standard ASA monitoring     Consents    Anesthesia Plan(s) and associated risks, benefits, and realistic alternatives discussed. Questions answered and patient/representative(s) expressed understanding.     - Discussed: CRNA     - Discussed with:  Patient, family               Postoperative Care         Comments:    Other Comments: Anesthetic Plan to perform a Lumbar Punture  Risks including but not limited to bleeding, infection, nerve damage, and PDPH were discussed with the patient, patients spouse, and patients daughter. All in agreement with the plan. All " questions answered at this time.                KELI Yousif CRNA    I have reviewed the pertinent notes and labs in the chart from the past 30 days and (re)examined the patient.  Any updates or changes from those notes are reflected in this note.    Clinically Significant Risk Factors Present on Admission         # Hyponatremia: Lowest Na = 129 mmol/L in last 2 days, will monitor as appropriate  # Hypochloremia: Lowest Cl = 93 mmol/L in last 2 days, will monitor as appropriate        # Thrombocytopenia: Lowest platelets = 83 in last 2 days, will monitor for bleeding   # Hypertension: Noted on problem list               # COPD: noted on problem list
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
## 2025-06-06 NOTE — PHARMACY-VANCOMYCIN DOSING SERVICE
Pharmacy Vancomycin Initial Note  Date of Service 2025  Patient's  1960  64 year old, female    Indication: Meningitis    Current estimated CrCl = Estimated Creatinine Clearance: 49.1 mL/min (A) (based on SCr of 1.04 mg/dL (H)).    Creatinine for last 3 days  2025:  5:35 PM Creatinine 1.04 mg/dL    Recent Vancomycin Level(s) for last 3 days  No results found for requested labs within last 3 days.      Vancomycin IV Administrations (past 72 hours)        No vancomycin orders with administrations in past 72 hours.                    Nephrotoxins and other renal medications (From now, onward)      Start     Dose/Rate Route Frequency Ordered Stop    25  vancomycin (VANCOCIN) 1,250 mg in 0.9% NaCl 250 mL intermittent infusion         1,250 mg  over 90 Minutes Intravenous EVERY 24 HOURS 25  acyclovir (ZOVIRAX) 550 mg in sodium chloride 0.9 % 111 mL intermittent infusion         10 mg/kg × 56.2 kg  111 mL/hr over 1 Hours Intravenous ONCE 25              Contrast Orders - past 72 hours (72h ago, onward)      None                  Plan:  Start vancomycin 1250 mg IV ONCE (as a 22 mg/kg bolus) followed by 1000 mg IV every 12 hours. Renal function is 49.1 ml/min which puts patient right at the threshold for q24h vs q12h dosing. Given the indication of meningitis and likely CARLOS (using a rough baseline creatinine from outside records), will dose as q12h. Please assess with new renal labs and change to q24h if needed.    Vancomycin monitoring method: Trough (Method 2 = manual dose calculation)  Vancomycin therapeutic monitoring goal: 400-600 mg*h/L  Pharmacy will check vancomycin levels as appropriate in 1-3 Days.    Serum creatinine levels will be ordered daily for the first week of therapy and at least twice weekly for subsequent weeks.      Sara Pittman, [facility]

## 2025-06-06 NOTE — PROGRESS NOTES
Patient transferred to room 334 from ICU.  Patient is alert and oriented.  Denies shortness of breath after transfer.  Oriented to room.  No needs at this time.

## 2025-06-06 NOTE — PHARMACY-ADMISSION MEDICATION HISTORY
"Pharmacist Admission Medication History    Admission medication history is complete. The information provided in this note is only as accurate as the sources available at the time of the update.    Information Source(s): Patient via in-person    Pertinent Information: Patient was very good historian needing no prompting during interview. Attests that she was delerious yesterday and cannot recall last admin times.  does not know if she took them while he was away.     Patient attests she takes topical med for pain -> says it starts with \"D\" but it is not voltaren. Could not find Hx of other Rx topical.     Despite no recent fills patient attests to good supply of inhalers at home.         Has Neb machine but does not use currently     Changes made to PTA medication list:  Added: Buproprion, Duloxetine, symbicort   Deleted: albuterol neb, alendronate, Nebusal, trazodone, VitD3,   Changed: Gabapentin 1 cap daily then 2 cap bedtime -> 2 cap TID    Allergies reviewed with patient: yes    Medication History Completed By: Mau Orosco [facility] 6/6/2025 11:17 AM    PTA Med List   Medication Sig Last Dose/Taking    albuterol (PROAIR HFA/PROVENTIL HFA/VENTOLIN HFA) 108 (90 Base) MCG/ACT inhaler Inhale 2 Puffs into the lungs four times a day as needed. Past Week    budesonide-formoterol (SYMBICORT/BREYNA) 80-4.5 MCG/ACT inhaler Inhale 2 puffs into the lungs 2 times daily. 6/5/2025 Morning    buPROPion (WELLBUTRIN XL) 300 MG 24 hr tablet Take 300 mg by mouth daily. 6/5/2025 Morning    diclofenac (VOLTAREN) 1 % topical gel Apply 2 g topically 4 times daily as needed for moderate pain. Taking As Needed    DULoxetine HCl 60 MG CSDR Take 1 capsule by mouth 2 times daily. 6/5/2025    gabapentin (NEURONTIN) 400 MG capsule Take 800 mg by mouth 3 times daily. 6/6/2025 Morning    hydrOXYzine (ATARAX) 10 MG tablet Take 1-2 tablets (10-20 mg) by mouth 3 times daily as needed for anxiety More than a month    SPIRIVA HANDIHALER 18 MCG " inhaled capsule Inhale 1 Capsule into the lungs one time a day using HandiHaler device 6/5/2025

## 2025-06-06 NOTE — PLAN OF CARE
" NSG ADMISSION NOTE    Patient admitted to room 916 at approximately 0035 via cart from emergency room. Patient was accompanied by nurse.     Verbal SBAR report received from MAJO Esparza prior to patient arrival.     Patient trasferred to bed via air lamar. Patient alert and oriented X 2. The patient is not having any pain.  . Admission vital signs: Blood pressure 98/63, pulse 70, temperature 98.3  F (36.8  C), temperature source Temporal, resp. rate 16, height 1.676 m (5' 6\"), weight 54.8 kg (120 lb 13 oz), SpO2 (!) 90%, not currently breastfeeding. Patient was oriented to plan of care, call light, bed controls, tv, telephone, bathroom, and visiting hours.     Risk Assessment    The following safety risks were identified during admission: fall. Yellow risk band applied: YES.     Skin Initial Assessment    This writer admitted this patient and completed a full skin assessment and Prabhjot score in the Adult PCS flowsheet.   Photo documentation of skin problem and/or wound competed via Planday application (located under Media):  N/A    Appropriate interventions initiated as needed.     Secondary skin check completed by Lj KASPER.    Prabhjot Risk Assessment  Sensory Perception: 3-->slightly limited  Moisture: 3-->occasionally moist  Activity: 3-->walks occasionally  Mobility: 3-->slightly limited  Nutrition: 3-->adequate  Friction and Shear: 3-->no apparent problem  Prabhjot Score: 18  Moisture Interventions: Encourage regular toileting, Urinary collection device  Mattress: Low air loss (MAXIMUS pump, Isolibrium, Skin Guard, Pulsate, Isoair, etc.)  Bed Frame: Standard width and length    Education    Patient has a Effort to Observation order: No  Observation education completed and documented: N/A    Admission/Transfer from: ER  2 RN skin assessment completed. Yes  Significant findings include: N/A  WOC Nurse Consult Ordered? No       Jackie Rogel RN      "

## 2025-06-06 NOTE — ANESTHESIA PROCEDURE NOTES
"Lumbar puncture Procedure Note    Pre-Procedure   Staff -        CRNA: Raven Sahni APRN CRNA       Performed By: CRNA       Location: ED       Procedure Start/Stop Times: 6/5/2025 8:55 PM and 6/5/2025 9:07 PM       Pre-Anesthestic Checklist: patient identified, IV checked, risks and benefits discussed, informed consent, monitors and equipment checked, pre-op evaluation, at physician/surgeon's request and post-op pain management  Timeout:       Correct Patient: Yes        Correct Procedure: Yes        Correct Site: Yes        Correct Position: Yes   Procedure Documentation  Procedure: lumbar puncture         Diagnosis: neurologic changes       Patient Position: sitting       Patient Prep/Sterile Barriers: sterile gloves, mask       Skin prep: Chloraprep       Insertion Site: L3-4. (midline approach).       Needle Gauge: 25.        Needle Length (Inches): 3.5        Spinal Needle Type: Pencan       Introducer used       Introducer: 20 G       # of attempts: 1 and  # of redirects:  0    Assessment/Narrative         Paresthesias: No.       LP fluid removal amount (ml): 9       CSF fluid: clear.       Opening pressure was 28 cmH2O while  Sitting.      Medication(s) Administered   Medication Administration Time: 6/5/2025 8:55 PM     Comments:  Daughter and Spouse present in the room both wore a mask, patient wore a bouffant. Patient calm and cooperative throughout procedure.       FOR [facility] ([facility] [place]) ONLY:   Pain Team Contact information: please page the Pain Team Via [employer]. Search \"Pain\". During daytime hours, please page the attending first. At night please page the resident first.      "

## 2025-06-06 NOTE — PLAN OF CARE
Goal Outcome Evaluation:    Patient is alert and oriented.  Denies any pain since being admitted to floor.  Patient has some expiratory wheezes posterior and fine crackles in bases.  Sp02 > 92% on room.  Patient ambulates with steady gait.  VSS and afebrile.

## 2025-06-06 NOTE — ANESTHESIA POSTPROCEDURE EVALUATION
Patient: Lisy Telles    Procedure: * No procedures listed *       Anesthesia Type:  No value filed.    Note:  Disposition: Outpatient (currently in ED, unsure of future plan)   Postop Pain Control: Uneventful            Sign Out: Well controlled pain   PONV: No   Neuro/Psych: Uneventful            Sign Out: Acceptable/Baseline neuro status   Airway/Respiratory: Uneventful            Sign Out: Acceptable/Baseline resp. status   CV/Hemodynamics: Uneventful            Sign Out: Acceptable CV status; No obvious hypovolemia; No obvious fluid overload   Other NRE: NONE   DID A NON-ROUTINE EVENT OCCUR? No           Last vitals:  Vitals:    06/05/25 2045 06/05/25 2100 06/05/25 2115   BP: 107/65 98/69 99/62   Pulse: 75 75 77   Resp: 17 19 17   Temp:      SpO2: 93% 94% 92%       Electronically Signed By: KELI Yousif CRNA  June 5, 2025  9:47 PM

## 2025-06-06 NOTE — PLAN OF CARE
Goal Outcome Evaluation:  Pt is A&OX4 this AM. Pleasant. Norepi off since 0200. MAP's remain >65. Afebrile. O2 on room air. C/O headache pain. Tylenol given. Continent of bladder. Up with SBA to bathroom. U/O adequate.       Plan of Care Reviewed With: patient    Overall Patient Progress: improving    Outcome Evaluation: Norepi off, mentation improved, weakness improving

## 2025-06-06 NOTE — H&P
[facility]    History and Physical - Hospitalist Service       Date of Admission:  6/5/2025    Assessment & Plan    Fever  -- Likely secondary to pneumonia.   Chest x-ray showed possible right lower lobe pneumonia.  UA showed no signs of infection.  --LP is done because she had some confusion.  CSF showed no growth so far  --She is currently on ceftriaxone and vancomycin.    Altered mental status  She is alert and oriented when I examined her.  -- She recently completed alcohol detox program about a month ago.  --- She has slight hyponatremia.  IV fluids   --I ordered thiamine.      Hyponatremia  -- Likely secondary hydration  --Follow-up urine study, serum osmolality, urine sodium  --She is currently on IV fluids.    Thrombocytopenia  Likely secondary to alcohol induced splenomegaly.  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          Diet: Combination Diet Regular Diet Adult; 2 gm NA Diet    DVT Prophylaxis: Enoxaparin (Lovenox) SQ  Soto Catheter: Not present  Lines: None     Cardiac Monitoring: None  Code Status: Full Code      Clinically Significant Risk Factors Present on Admission         # Hyponatremia: Lowest Na = 129 mmol/L in last 2 days, will monitor as appropriate  # Hypochloremia: Lowest Cl = 93 mmol/L in last 2 days, will monitor as appropriate  # Hypocalcemia: Lowest Ca = 7.6 mg/dL in last 2 days, will monitor and replace as appropriate       # Thrombocytopenia: Lowest platelets = 74 in last 2 days, will monitor for bleeding   # Hypertension: Noted on problem list   # Circulatory Shock: required vasopressors within past 24 hours                # COPD: noted on problem list        Disposition Plan     Medically Ready for Discharge: Anticipated in 2-4 Days           Yasoda Meesala,  MD  Hospitalist Service  [facility]  Securely message with Nilsa (more info)  Text page via [facility] Paging/Directory     ______________________________________________________________________    Chief Complaint   Fever, altered mental status    History is obtained from the patient    History of Present Illness   Lisy Telles is a 64 year old female patient with history of alcohol abuse and recently completed detox program about a month ago presented with fever and altered mental status.    She states that she has fever of 102.5.  She was seen 2 weeks ago and has confusion over the last 1 week.  She has some diarrhea recently but currently denies.  She has occasional cough.  She has mild neck pain but no stiffness.  Her white count is 4.  Lactic acid level is 1.4.  pH 7.4.  pCO2 48 pO2 19 bicarb 31.  COVID and flu test are negative.  The chest x-ray showed possible right-sided infiltrate.  CT head showed stable meningioma with mild mass effect.  Lumbar puncture showed clear colorless CSF.  Protein 27.  Glucose 59.  She has hyponatremia with sodium level 129.  She is currently on Rocephin, vancomycin for possible pneumonia.  We will continue vancomycin as meningitis is ruled out.     She has chronic right hand weakness.  She denies any other complaints.      Past Medical History    Past Medical History:   Diagnosis Date    Age-related osteoporosis with current pathological fracture with routine healing     9/20/2017    Anxiety disorder     No Comments Provided    Appendicitis     No Comments Provided    Benign neoplasm of connective and other soft tissue, unspecified     Right Breast    Bilateral carpal tunnel syndrome     Brachial plexus disorders     6/24/2016    Chronic obstructive pulmonary disease (H)     No Comments Provided    Essential (primary) hypertension     No Comments Provided    Major depressive disorder, single episode     Depression with insomnia and tiredness    Other  displaced fracture of upper end of left humerus, subsequent encounter for fracture with routine healing     2016    Other fracture of unspecified lower leg, initial encounter for closed fracture     Fracture, right ankle    Personal history of other medical treatment (CODE)      4, Para 4-0-0-4    Pure hypercholesterolemia     No Comments Provided    Rectal polyp     Hyperplastic polyp times three noted on colonoscopy 2007    Tobacco use     No Comments Provided    Uncomplicated alcohol dependence (H)     No Comments Provided       Past Surgical History   Past Surgical History:   Procedure Laterality Date    BRONCHOSCOPY DIAGNOSTIC  2018    MAC infection    CLOSED REDUCTION ANKLE      07,Open reduction/internal fixation, right ankle fracture 07.    COLONOSCOPY      ,screening for family history,  follow-up recommended in five years.    COLONOSCOPY      ,Colonoscopy 2007.  Follow-up recommended in five years.    COLONOSCOPY  2013,Normal, hemorrhoids - follow up 5 years for family history of colon cnacer    COLONOSCOPY N/A 2021    F/U  FH. hyperplastic   Surgeon: Marcel Dee MD;  Location: [facility] OR    HYSTERECTOMY TOTAL ABDOMINAL      2007,Laparoscopic supracervical hysterectomy and bilateral salpingo-oophorectomy for bleeding fibroid    LAPAROSCOPIC BYPASS GASTRIC          LAPAROSCOPIC TUBAL LIGATION      1985    LOBECTOMY LUNG  2018    VATS - LOBECTOMY UPPER LUNG, Dr Edita St    LUMPECTOMY BREAST      Lumpectomy of the right breast    RELEASE CARPAL TUNNEL Left 2019    Procedure: RELEASE CARPAL TUNNEL;  Surgeon: Andrew Sahni MD;  Location: [facility] OR    RELEASE CARPAL TUNNEL Right 2019    Procedure: Open Carpal Tunnel Release;  Surgeon: Andrew Sahni MD;  Location: [facility] OR    SKIN GRAFT, EACH ADDN [id number]  2007    Skin grafting post right ankle fracture       Prior to Admission Medications   Prior to  Admission Medications   Prescriptions Last Dose Informant Patient Reported? Taking?   SPIRIVA HANDIHALER 18 MCG inhaled capsule   No No   Sig: Inhale 1 Capsule into the lungs one time a day using HandiHaler device   albuterol (PROAIR HFA/PROVENTIL HFA/VENTOLIN HFA) 108 (90 Base) MCG/ACT inhaler   No No   Sig: Inhale 2 Puffs into the lungs four times a day as needed.   albuterol (PROVENTIL) (2.5 MG/3ML) 0.083% neb solution   No No   Sig: Take 1 vial (2.5 mg) by nebulization 4 times daily   alendronate (FOSAMAX) 70 MG tablet   No No   Sig: Take 1 tablet (70 mg) by mouth every 7 days Take in the morning on empty stomach with full glass of water. Do not lie down for 1 hr.   diclofenac (VOLTAREN) 1 % topical gel   No No   Sig: APPLY 2 GRAMS TOPICALLY TO THE AFFECTED AREA FOUR TIMES DAILY   gabapentin (NEURONTIN) 400 MG capsule   No No   Sig: Take 1 Capsule by mouth one time a day AND 2 Capsules at bedtime.   hydrOXYzine (ATARAX) 10 MG tablet   No No   Sig: Take 1-2 tablets (10-20 mg) by mouth 3 times daily as needed for anxiety   sodium chloride (NEBUSAL) 3 % neb solution   No No   Sig: Take 3 mLs by nebulization 3 times daily as needed for wheezing INHALE 3ML AS DIRECTED   traZODone (DESYREL) 150 MG tablet   No No   Sig: TAKE 1 TABLET(150 MG) BY MOUTH AT BEDTIME   vitamin D3 (CHOLECALCIFEROL) 50 mcg (2000 units) tablet   No No   Sig: Take 1 tablet (50 mcg) by mouth daily      Facility-Administered Medications: None        Review of Systems    The 10 point Review of Systems is negative other than noted in the HPI or here.     Social History   I have reviewed this patient's social history and updated it with pertinent information if needed.  Social History     Tobacco Use    Smoking status: Heavy Smoker     Current packs/day: 1.10     Average packs/day: 1.1 packs/day for 44.0 years (48.4 ttl pk-yrs)     Types: Cigarettes    Smokeless tobacco: Never    Tobacco comments:     8 per day-has patches   Vaping Use    Vaping  status: Never Used   Substance Use Topics    Alcohol use: Not Currently     Alcohol/week: 0.0 standard drinks of alcohol    Drug use: No         Family History   I have reviewed this patient's family history and updated it with pertinent information if needed.  Family History   Problem Relation Age of Onset    Heart Disease Father 36        Heart Disease,MI  - Quadruple bypass    Hypertension Father         Hypertension    Other - See Comments Father         Obesity    Colon Cancer Mother 40        Cancer-colon,surgical resection--remission    Hypertension Mother         Hypertension    Other - See Comments Mother         Obesity    Colon Cancer Maternal Grandmother         Cancer-colon    Thyroid Disease Sister         Thyroid Disease    Breast Cancer Paternal Aunt 60        Cancer-breast,         Allergies   No Known Allergies     Physical Exam   Vital Signs: Temp: 99.4  F (37.4  C) Temp src: Temporal BP: 121/73 Pulse: 74   Resp: 17 SpO2: 92 % O2 Device: None (Room air)    Weight: 119 lbs 7.83 oz    Physical Exam  Vitals reviewed.   Constitutional:       General: She is not in acute distress.     Appearance: Normal appearance. She is not ill-appearing or toxic-appearing.   HENT:      Head: Normocephalic and atraumatic.      Right Ear: External ear normal.      Left Ear: External ear normal.      Nose: Nose normal. No congestion or rhinorrhea.      Mouth/Throat:      Mouth: Mucous membranes are moist.   Eyes:      General: No scleral icterus.        Right eye: No discharge.         Left eye: No discharge.      Extraocular Movements: Extraocular movements intact.      Conjunctiva/sclera: Conjunctivae normal.      Pupils: Pupils are equal, round, and reactive to light.   Cardiovascular:      Rate and Rhythm: Normal rate and regular rhythm.      Pulses: Normal pulses.      Heart sounds: Normal heart sounds. No murmur heard.     No friction rub. No gallop.   Pulmonary:      Effort: Pulmonary effort is normal.  No respiratory distress.      Comments: Diminished breath sounds bilaterally  Abdominal:      General: Bowel sounds are normal. There is no distension.      Palpations: Abdomen is soft.      Tenderness: There is no abdominal tenderness. There is no guarding or rebound.   Musculoskeletal:         General: Normal range of motion.      Cervical back: Normal range of motion. No rigidity.      Right lower leg: No edema.      Left lower leg: No edema.   Skin:     General: Skin is warm and dry.   Neurological:      General: No focal deficit present.      Mental Status: She is alert and oriented to person, place, and time. Mental status is at baseline.      Comments: Chronic right hand weakness.  ?  Carpal tunnel   Psychiatric:         Mood and Affect: Mood normal.         Behavior: Behavior normal.         Medical Decision Making       75 MINUTES SPENT BY ME on the date of service doing chart review, history, exam, documentation & further activities per the note.      Data     I have personally reviewed the following data over the past 24 hrs:    3.4 (L)  \   11.8   / 74 (L)     129 (L) 102 12.2 /  71   4.1 23 0.77 \     ALT: 13 AST: 25 AP: 79 TBILI: 0.8   ALB: 3.5 TOT PROTEIN: 6.4 LIPASE: 31     Trop: 14 BNP: N/A     TSH: 2.11 T4: N/A A1C: N/A     Procal: 1.32 (H) CRP: N/A Lactic Acid: 1.4         Imaging results reviewed over the past 24 hrs:   Recent Results (from the past 24 hours)   XR Chest 2 Views    Narrative    EXAM: XR CHEST 2 VIEWS  LOCATION: [facility]  DATE: 6/5/2025    INDICATION: fever, altered mental status  COMPARISON: 7/3/2022      Impression    IMPRESSION: Heart size is normal. Lungs are hyperinflated with chronic appearing interstitial scarring or fibrosis. There is increased groundglass opacity over the right lower lobe, suspicious for developing infiltrate. No CHF, large effusions or   pneumothorax. No acute bony abnormalities.   CT Head w/o Contrast    Narrative    EXAM: CT HEAD  W/O CONTRAST  LOCATION: [facility]  DATE: 6/5/2025    INDICATION: Altered mental status.  COMPARISON: 12/29/2022.  TECHNIQUE: Routine CT Head without IV contrast. Multiplanar reformats. Dose reduction techniques were used.    FINDINGS:  INTRACRANIAL CONTENTS: A partially calcified presumed meningioma overlying the parasagittal left frontal lobe appears similar in size compared with the prior brain MRI allowing for differences in modality. There is mild mass effect on the underlying left   frontal lobe without definite associated edema. No CT evidence of acute infarct. Normal parenchymal attenuation. Mild generalized volume loss. No hydrocephalus.     VISUALIZED ORBITS/SINUSES/MASTOIDS: No intraorbital abnormality. No paranasal sinus mucosal disease. No middle ear or mastoid effusion.    BONES/SOFT TISSUES: No acute abnormality.      Impression    IMPRESSION:  1.  No acute intracranial pathology.  2.  Grossly stable presumed meningioma overlying the parasagittal left frontal lobe.     Visit/Communication Style   Virtual (Video) communication was used to evaluate Lisy.  Lisy consented to the use of video communication: yes    Patient's location: [facility]   Provider's location during the visit: [facility] Tele-medicine site

## 2025-06-06 NOTE — ASSESSMENT & PLAN NOTE
She is alert and oriented when I examined her.  -- She recently completed alcohol detox program about a month ago.  --- She has slight hyponatremia.  IV fluids   --I ordered thiamine.

## 2025-06-07 PROBLEM — A79.82: Status: ACTIVE | Noted: 2025-06-07

## 2025-06-07 LAB
A PHAGOCYTOPH DNA BLD QL NAA+PROBE: DETECTED
ALBUMIN SERPL BCG-MCNC: 2.6 G/DL (ref 3.5–5.2)
ALP SERPL-CCNC: 56 U/L (ref 40–150)
ALT SERPL W P-5'-P-CCNC: 10 U/L (ref 0–50)
ANION GAP SERPL CALCULATED.3IONS-SCNC: 3 MMOL/L (ref 7–15)
AST SERPL W P-5'-P-CCNC: 21 U/L (ref 0–45)
BABESIA DNA BLD QL NAA+PROBE: NOT DETECTED
BILIRUB DIRECT SERPL-MCNC: 0.1 MG/DL (ref 0–0.3)
BILIRUB SERPL-MCNC: 0.2 MG/DL
BUN SERPL-MCNC: 9 MG/DL (ref 8–23)
CALCIUM SERPL-MCNC: 7.2 MG/DL (ref 8.8–10.4)
CHLORIDE SERPL-SCNC: 107 MMOL/L (ref 98–107)
CREAT SERPL-MCNC: 0.74 MG/DL (ref 0.51–0.95)
EGFRCR SERPLBLD CKD-EPI 2021: 90 ML/MIN/1.73M2
EHRLICHIA DNA SPEC QL NAA+PROBE: NOT DETECTED
ERYTHROCYTE [DISTWIDTH] IN BLOOD BY AUTOMATED COUNT: 12.8 % (ref 10–15)
GLUCOSE SERPL-MCNC: 79 MG/DL (ref 70–99)
HCO3 SERPL-SCNC: 25 MMOL/L (ref 22–29)
HCT VFR BLD AUTO: 32.4 % (ref 35–47)
HGB BLD-MCNC: 10.7 G/DL (ref 11.7–15.7)
MCH RBC QN AUTO: 30.8 PG (ref 26.5–33)
MCHC RBC AUTO-ENTMCNC: 33 G/DL (ref 31.5–36.5)
MCV RBC AUTO: 93 FL (ref 78–100)
PLATELET # BLD AUTO: 75 10E3/UL (ref 150–450)
POTASSIUM SERPL-SCNC: 4.4 MMOL/L (ref 3.4–5.3)
PROT SERPL-MCNC: 4.9 G/DL (ref 6.4–8.3)
RBC # BLD AUTO: 3.47 10E6/UL (ref 3.8–5.2)
SODIUM SERPL-SCNC: 135 MMOL/L (ref 135–145)
WBC # BLD AUTO: 3.7 10E3/UL (ref 4–11)

## 2025-06-07 PROCEDURE — 250N000011 HC RX IP 250 OP 636: Performed by: FAMILY MEDICINE

## 2025-06-07 PROCEDURE — 999N000157 HC STATISTIC RCP TIME EA 10 MIN

## 2025-06-07 PROCEDURE — 120N000001 HC R&B MED SURG/OB

## 2025-06-07 PROCEDURE — 250N000013 HC RX MED GY IP 250 OP 250 PS 637: Performed by: FAMILY MEDICINE

## 2025-06-07 PROCEDURE — 82310 ASSAY OF CALCIUM: CPT | Performed by: FAMILY MEDICINE

## 2025-06-07 PROCEDURE — 250N000009 HC RX 250: Performed by: FAMILY MEDICINE

## 2025-06-07 PROCEDURE — 85014 HEMATOCRIT: CPT | Performed by: FAMILY MEDICINE

## 2025-06-07 PROCEDURE — 84155 ASSAY OF PROTEIN SERUM: CPT | Performed by: FAMILY MEDICINE

## 2025-06-07 PROCEDURE — 99232 SBSQ HOSP IP/OBS MODERATE 35: CPT | Performed by: FAMILY MEDICINE

## 2025-06-07 PROCEDURE — 36415 COLL VENOUS BLD VENIPUNCTURE: CPT | Performed by: FAMILY MEDICINE

## 2025-06-07 PROCEDURE — 94640 AIRWAY INHALATION TREATMENT: CPT | Mod: 76

## 2025-06-07 PROCEDURE — 250N000012 HC RX MED GY IP 250 OP 636 PS 637: Performed by: FAMILY MEDICINE

## 2025-06-07 RX ADMIN — ONDANSETRON 4 MG: 4 TABLET, ORALLY DISINTEGRATING ORAL at 20:01

## 2025-06-07 RX ADMIN — THIAMINE HYDROCHLORIDE 100 MG: 100 INJECTION, SOLUTION INTRAMUSCULAR; INTRAVENOUS at 10:21

## 2025-06-07 RX ADMIN — TRAZODONE HYDROCHLORIDE 50 MG: 50 TABLET ORAL at 21:31

## 2025-06-07 RX ADMIN — GABAPENTIN 800 MG: 400 CAPSULE ORAL at 21:31

## 2025-06-07 RX ADMIN — ONDANSETRON 4 MG: 4 TABLET, ORALLY DISINTEGRATING ORAL at 10:35

## 2025-06-07 RX ADMIN — GABAPENTIN 800 MG: 400 CAPSULE ORAL at 06:33

## 2025-06-07 RX ADMIN — DOXYCYCLINE 100 MG: 100 CAPSULE ORAL at 07:51

## 2025-06-07 RX ADMIN — ENOXAPARIN SODIUM 40 MG: 40 INJECTION SUBCUTANEOUS at 21:31

## 2025-06-07 RX ADMIN — PREDNISONE 40 MG: 20 TABLET ORAL at 10:21

## 2025-06-07 RX ADMIN — IPRATROPIUM BROMIDE AND ALBUTEROL SULFATE 3 ML: .5; 3 SOLUTION RESPIRATORY (INHALATION) at 18:43

## 2025-06-07 RX ADMIN — BUPROPION HYDROCHLORIDE 300 MG: 150 TABLET, EXTENDED RELEASE ORAL at 10:21

## 2025-06-07 RX ADMIN — SODIUM CHLORIDE: 0.9 INJECTION, SOLUTION INTRAVENOUS at 02:36

## 2025-06-07 RX ADMIN — DULOXETINE 60 MG: 30 CAPSULE, DELAYED RELEASE ORAL at 21:31

## 2025-06-07 RX ADMIN — IPRATROPIUM BROMIDE AND ALBUTEROL SULFATE 3 ML: .5; 3 SOLUTION RESPIRATORY (INHALATION) at 15:04

## 2025-06-07 RX ADMIN — DOXYCYCLINE 100 MG: 100 CAPSULE ORAL at 20:00

## 2025-06-07 RX ADMIN — SODIUM CHLORIDE: 0.9 INJECTION, SOLUTION INTRAVENOUS at 21:36

## 2025-06-07 RX ADMIN — SODIUM CHLORIDE: 0.9 INJECTION, SOLUTION INTRAVENOUS at 12:30

## 2025-06-07 RX ADMIN — FOLIC ACID 1 MG: 1 TABLET ORAL at 07:51

## 2025-06-07 RX ADMIN — GABAPENTIN 800 MG: 400 CAPSULE ORAL at 13:38

## 2025-06-07 RX ADMIN — DULOXETINE 60 MG: 30 CAPSULE, DELAYED RELEASE ORAL at 10:21

## 2025-06-07 RX ADMIN — IPRATROPIUM BROMIDE AND ALBUTEROL SULFATE 3 ML: .5; 3 SOLUTION RESPIRATORY (INHALATION) at 07:45

## 2025-06-07 ASSESSMENT — ACTIVITIES OF DAILY LIVING (ADL)
ADLS_ACUITY_SCORE: 41
ADLS_ACUITY_SCORE: 41
ADLS_ACUITY_SCORE: 44
ADLS_ACUITY_SCORE: 30
ADLS_ACUITY_SCORE: 41
ADLS_ACUITY_SCORE: 30
ADLS_ACUITY_SCORE: 44
ADLS_ACUITY_SCORE: 30
ADLS_ACUITY_SCORE: 44
ADLS_ACUITY_SCORE: 30
ADLS_ACUITY_SCORE: 41
ADLS_ACUITY_SCORE: 44
ADLS_ACUITY_SCORE: 30
ADLS_ACUITY_SCORE: 41
ADLS_ACUITY_SCORE: 30
ADLS_ACUITY_SCORE: 44
ADLS_ACUITY_SCORE: 41
ADLS_ACUITY_SCORE: 41
ADLS_ACUITY_SCORE: 30
ADLS_ACUITY_SCORE: 30
ADLS_ACUITY_SCORE: 44

## 2025-06-07 NOTE — PLAN OF CARE
Goal Outcome Evaluation:    Patient is alert and oriented. Denies any pain.  Has dyspnea with exertion that resolves with rest and deep breathing.  Patient has intermittent expiratory wheezes and crackles in bases bilaterally.  Patient Sp02 >90 % on room air.  Had some nausea after breakfast and given zofran before lunch.  Patient was able to tolerate lunch with no further nausea.  VSS and afebrile.

## 2025-06-07 NOTE — PROGRESS NOTES
Received call from Jeri in infectious disease. Patient had a critical result: Positive for Anaplasma phagocytophilum. Notified Dr. Peace, and provider aware.    Riya Lloyd RN on 6/7/2025 at 2:33 PM

## 2025-06-07 NOTE — PROGRESS NOTES
Pt remains on RA. Nebs given as scheduled. No further respiratory interventions. Mary Grace Mtz, RRT
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
## 2025-06-07 NOTE — PLAN OF CARE
Goal Outcome Evaluation:      Plan of Care Reviewed With: patient    Overall Patient Progress: improvingOverall Patient Progress: improving    VSS, afebrile, denies pain.  Alert and oriented, pleasant. SBA. SOB with exertion.  She's feeling much better and is looking forward to going home.  Qi Caruso RN............................ 6/7/2025 6:38 AM

## 2025-06-08 ENCOUNTER — APPOINTMENT (OUTPATIENT)
Dept: GENERAL RADIOLOGY | Facility: OTHER | Age: 65
End: 2025-06-08
Attending: FAMILY MEDICINE
Payer: COMMERCIAL

## 2025-06-08 VITALS
HEART RATE: 72 BPM | WEIGHT: 122.2 LBS | HEIGHT: 66 IN | OXYGEN SATURATION: 90 % | RESPIRATION RATE: 18 BRPM | SYSTOLIC BLOOD PRESSURE: 109 MMHG | BODY MASS INDEX: 19.64 KG/M2 | TEMPERATURE: 96 F | DIASTOLIC BLOOD PRESSURE: 58 MMHG

## 2025-06-08 LAB
POTASSIUM SERPL-SCNC: 4.5 MMOL/L (ref 3.4–5.3)
PROCALCITONIN SERPL IA-MCNC: 0.92 NG/ML

## 2025-06-08 PROCEDURE — 84132 ASSAY OF SERUM POTASSIUM: CPT | Performed by: FAMILY MEDICINE

## 2025-06-08 PROCEDURE — 94640 AIRWAY INHALATION TREATMENT: CPT

## 2025-06-08 PROCEDURE — 99239 HOSP IP/OBS DSCHRG MGMT >30: CPT | Performed by: FAMILY MEDICINE

## 2025-06-08 PROCEDURE — 999N000157 HC STATISTIC RCP TIME EA 10 MIN

## 2025-06-08 PROCEDURE — 250N000013 HC RX MED GY IP 250 OP 250 PS 637: Performed by: FAMILY MEDICINE

## 2025-06-08 PROCEDURE — 36415 COLL VENOUS BLD VENIPUNCTURE: CPT | Performed by: FAMILY MEDICINE

## 2025-06-08 PROCEDURE — 250N000009 HC RX 250: Performed by: FAMILY MEDICINE

## 2025-06-08 PROCEDURE — 84145 PROCALCITONIN (PCT): CPT | Performed by: FAMILY MEDICINE

## 2025-06-08 PROCEDURE — 71046 X-RAY EXAM CHEST 2 VIEWS: CPT

## 2025-06-08 PROCEDURE — 71046 X-RAY EXAM CHEST 2 VIEWS: CPT | Mod: 26 | Performed by: RADIOLOGY

## 2025-06-08 PROCEDURE — 94640 AIRWAY INHALATION TREATMENT: CPT | Mod: 76

## 2025-06-08 RX ORDER — DOXYCYCLINE 100 MG/1
100 CAPSULE ORAL EVERY 12 HOURS
Qty: 16 CAPSULE | Refills: 0 | Status: SHIPPED | OUTPATIENT
Start: 2025-06-08

## 2025-06-08 RX ORDER — BUDESONIDE 0.5 MG/2ML
0.5 INHALANT ORAL ONCE
Status: COMPLETED | OUTPATIENT
Start: 2025-06-08 | End: 2025-06-08

## 2025-06-08 RX ORDER — DOXYCYCLINE 100 MG/1
100 CAPSULE ORAL EVERY 12 HOURS
Qty: 16 CAPSULE | Refills: 0 | Status: SHIPPED | OUTPATIENT
Start: 2025-06-08 | End: 2025-06-08

## 2025-06-08 RX ORDER — POLYETHYLENE GLYCOL 3350 17 G/17G
17 POWDER, FOR SOLUTION ORAL ONCE
Status: COMPLETED | OUTPATIENT
Start: 2025-06-08 | End: 2025-06-08

## 2025-06-08 RX ADMIN — DULOXETINE 60 MG: 30 CAPSULE, DELAYED RELEASE ORAL at 10:32

## 2025-06-08 RX ADMIN — BUDESONIDE 0.5 MG: 0.5 INHALANT RESPIRATORY (INHALATION) at 10:08

## 2025-06-08 RX ADMIN — TRAZODONE HYDROCHLORIDE 50 MG: 50 TABLET ORAL at 03:38

## 2025-06-08 RX ADMIN — DOXYCYCLINE 100 MG: 100 CAPSULE ORAL at 08:25

## 2025-06-08 RX ADMIN — IPRATROPIUM BROMIDE AND ALBUTEROL SULFATE 3 ML: .5; 3 SOLUTION RESPIRATORY (INHALATION) at 07:08

## 2025-06-08 RX ADMIN — SODIUM CHLORIDE: 0.9 INJECTION, SOLUTION INTRAVENOUS at 07:37

## 2025-06-08 RX ADMIN — BUPROPION HYDROCHLORIDE 300 MG: 150 TABLET, EXTENDED RELEASE ORAL at 10:32

## 2025-06-08 RX ADMIN — IPRATROPIUM BROMIDE AND ALBUTEROL SULFATE 3 ML: .5; 3 SOLUTION RESPIRATORY (INHALATION) at 10:08

## 2025-06-08 RX ADMIN — GABAPENTIN 800 MG: 400 CAPSULE ORAL at 06:14

## 2025-06-08 RX ADMIN — FOLIC ACID 1 MG: 1 TABLET ORAL at 08:25

## 2025-06-08 RX ADMIN — POLYETHYLENE GLYCOL 3350 17 G: 17 POWDER, FOR SOLUTION ORAL at 10:32

## 2025-06-08 ASSESSMENT — ACTIVITIES OF DAILY LIVING (ADL)
ADLS_ACUITY_SCORE: 41

## 2025-06-08 NOTE — PROGRESS NOTES
NS[facility] DISCHARGE NOTE    Patient discharged to home at 11:40 AM via wheel chair. Accompanied by spouse and staff. Discharge instructions reviewed with patient and spouse, opportunity offered to ask questions. Prescriptions sent to patients preferred pharmacy. All belongings sent with patient.    Eliane Rich RN

## 2025-06-08 NOTE — PLAN OF CARE
Goal Outcome Evaluation:      Plan of Care Reviewed With: patient    Overall Patient Progress: improvingOverall Patient Progress: improving    VSS, afebrile, denies pain.  Pt took trazodone in two separate doses during the night.  50 mg at bedtime and another 50 mg at 0330.  Pt states it doesn't matter what dose she takes, it only lasts for about four hours, so breaking it up in two doses helps her to get more sleep at night. Otherwise, no changes overnight.

## 2025-06-08 NOTE — PHARMACY - DISCHARGE MEDICATION RECONCILIATION AND EDUCATION
Pharmacy:  Discharge Counseling and Medication Reconciliation    Lisy Telles  [id number]  [facility] [id number] [phone] (home)   64 year old female  PCP: Madelin Lucas    Allergies: Patient has no known allergies.    Discharge Counseling:    Pharmacist met with patient (and/or family) today to review the medication portion of the After Visit Summary (with an emphasis on NEW medications) and to address patient's questions/concerns.    Summary of Education: Reviewed new medications of doxycycline, potential for increased risk of sunburn. Discussed taking with food, spacing out from irons/MVI/Antacids. Encouraged completion of therapy.      Materials Provided:  MedCounselor sheets printed from Clinical Pharmacology on: Doxycycline    Discharge Medication Reconciliation:    It has been determined that the patient has an adequate supply of medications available or which can be obtained from the patient's preferred pharmacy, which HE/SHE has confirmed as: [facility]    Thank you for the consult.    Emerita Boone RPH........June 8, 2025 10:09 AM

## 2025-06-08 NOTE — DISCHARGE INSTRUCTIONS
On Monday please call [phone] to schedule a 7 day Hospital follow-up with . If she is not available with in 7-10 days please schedule it with any Doctor that is available then.

## 2025-06-08 NOTE — PLAN OF CARE
Goal Outcome Evaluation:    Patient is alert and oriented.  Has some dyspnea with exertion that resolves with rest.  Patient sp02 89-91% on room air.  Patient has remained afebrile with stable vitals. Ambulates with steady gait and no dizziness.  Patient has family at home to help with cares if needed.  Patient in agreement with discharging to home and has met goals for discharge.
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
## 2025-06-09 ENCOUNTER — PATIENT OUTREACH (OUTPATIENT)
Dept: FAMILY MEDICINE | Facility: OTHER | Age: 65
End: 2025-06-09
Payer: COMMERCIAL

## 2025-06-09 LAB
ATRIAL RATE - MUSE: 94 BPM
B BURGDOR IGG+IGM SER QL: 2.22
DIASTOLIC BLOOD PRESSURE - MUSE: NORMAL MMHG
INTERPRETATION ECG - MUSE: NORMAL
P AXIS - MUSE: 22 DEGREES
PR INTERVAL - MUSE: 118 MS
QRS DURATION - MUSE: 96 MS
QT - MUSE: 346 MS
QTC - MUSE: 432 MS
R AXIS - MUSE: 4 DEGREES
SYSTOLIC BLOOD PRESSURE - MUSE: NORMAL MMHG
T AXIS - MUSE: 52 DEGREES
VENTRICULAR RATE- MUSE: 94 BPM

## 2025-06-09 NOTE — TELEPHONE ENCOUNTER
Patient has PCP elsewhere, no follow-up here. No TCM call required per policy.  Balbina Phan RN on 6/9/2025 at 8:32 AM

## 2025-06-10 LAB
BACTERIA SPEC CULT: NO GROWTH
BACTERIA SPEC CULT: NO GROWTH

## 2025-06-11 LAB
B BURGDOR IGG SERPL QL IA: 6.17 INDEX
B BURGDOR IGG SERPL QL IA: REACTIVE
B BURGDOR IGM SERPL QL IA: 1.22 INDEX
B BURGDOR IGM SERPL QL IA: REACTIVE
BACTERIA CSF CULT: NO GROWTH
GRAM STAIN RESULT: NORMAL
GRAM STAIN RESULT: NORMAL

## 2025-06-14 LAB — BACTERIA SPEC CULT: NO GROWTH

## 2025-07-19 ENCOUNTER — HEALTH MAINTENANCE LETTER (OUTPATIENT)
Age: 65
End: 2025-07-19

## (undated) DEVICE — SOL WATER 1500ML

## (undated) DEVICE — BLADE KNIFE SURG 15 SAFETY 373915

## (undated) DEVICE — DRAPE STERI TOWEL LG 1010

## (undated) DEVICE — DRSG KERLIX SUPER SPONGE 7310

## (undated) DEVICE — PACK MAJOR EXTREMITY SOP15MEFCA

## (undated) DEVICE — SU ETHILON 3-0 PS-2 18" 1669H

## (undated) DEVICE — DRSG XEROFORM 1X8"

## (undated) DEVICE — PREP CHLORAPREP 26ML TINTED ORANGE  260815

## (undated) DEVICE — GLOVE PROTEXIS POWDER FREE SMT 7.5  2D72PT75X

## (undated) DEVICE — ESU GROUND PAD ADULT W/CORD E7507

## (undated) DEVICE — DRSG GAUZE 4X4" 3033

## (undated) DEVICE — ESU ENDO FORCEP BX HOT FD-210U

## (undated) DEVICE — TUBING SUCTION 10'X3/16" N510

## (undated) DEVICE — SUCTION MANIFOLD NEPTUNE 2 SYS 4 PORT 0702-020-000

## (undated) DEVICE — Device

## (undated) DEVICE — NDL 25GA 1.5" 305127

## (undated) DEVICE — GLOVE BIOGEL INDICATOR 7.5 LF 41675

## (undated) DEVICE — ESU NDL COLORADO MICRO E1651

## (undated) DEVICE — ENDO BRUSH CHANNEL MASTER CLEANING 2-4.2MM BW-412T

## (undated) DEVICE — BNDG ELASTIC 2"X5YDS UNSTERILE 6611-20

## (undated) DEVICE — SU VICRYL 3-0 SH 27" UND J416H

## (undated) DEVICE — DRSG KERLIX 2 1/4"X3YDS ROLL 6720

## (undated) DEVICE — LIGHT HANDLES PLASTIC

## (undated) DEVICE — COVER LIGHT HANDLE LT-F02

## (undated) DEVICE — ENDO KIT COMPLIANCE DYKENDOCMPLY

## (undated) RX ORDER — CEFTRIAXONE 2 G/1
INJECTION, POWDER, FOR SOLUTION INTRAMUSCULAR; INTRAVENOUS
Status: DISPENSED
Start: 2025-06-05

## (undated) RX ORDER — ACETAMINOPHEN 325 MG/1
TABLET ORAL
Status: DISPENSED
Start: 2025-06-05

## (undated) RX ORDER — CEFAZOLIN SODIUM 2 G/100ML
INJECTION, SOLUTION INTRAVENOUS
Status: DISPENSED
Start: 2019-04-04

## (undated) RX ORDER — LIDOCAINE HYDROCHLORIDE 20 MG/ML
SOLUTION OROPHARYNGEAL
Status: DISPENSED
Start: 2020-11-24

## (undated) RX ORDER — BUPIVACAINE HYDROCHLORIDE AND EPINEPHRINE 5; 5 MG/ML; UG/ML
INJECTION, SOLUTION EPIDURAL; INTRACAUDAL; PERINEURAL
Status: DISPENSED
Start: 2019-04-04

## (undated) RX ORDER — PROPOFOL 10 MG/ML
INJECTION, EMULSION INTRAVENOUS
Status: DISPENSED
Start: 2019-02-21

## (undated) RX ORDER — OXYCODONE AND ACETAMINOPHEN 5; 325 MG/1; MG/1
TABLET ORAL
Status: DISPENSED
Start: 2019-02-21

## (undated) RX ORDER — PROPOFOL 10 MG/ML
INJECTION, EMULSION INTRAVENOUS
Status: DISPENSED
Start: 2019-04-04

## (undated) RX ORDER — NOREPINEPHRINE BITARTRATE 0.02 MG/ML
INJECTION, SOLUTION INTRAVENOUS
Status: DISPENSED
Start: 2025-06-05

## (undated) RX ORDER — CEFAZOLIN SODIUM 2 G/100ML
INJECTION, SOLUTION INTRAVENOUS
Status: DISPENSED
Start: 2019-02-21

## (undated) RX ORDER — PROPOFOL 10 MG/ML
INJECTION, EMULSION INTRAVENOUS
Status: DISPENSED
Start: 2021-02-01

## (undated) RX ORDER — LIDOCAINE HYDROCHLORIDE 10 MG/ML
INJECTION, SOLUTION EPIDURAL; INFILTRATION; INTRACAUDAL; PERINEURAL
Status: DISPENSED
Start: 2019-04-04

## (undated) RX ORDER — ONDANSETRON 2 MG/ML
INJECTION INTRAMUSCULAR; INTRAVENOUS
Status: DISPENSED
Start: 2020-10-01

## (undated) RX ORDER — BUPIVACAINE HYDROCHLORIDE AND EPINEPHRINE 5; 5 MG/ML; UG/ML
INJECTION, SOLUTION EPIDURAL; INTRACAUDAL; PERINEURAL
Status: DISPENSED
Start: 2019-02-21

## (undated) RX ORDER — FENTANYL CITRATE 50 UG/ML
INJECTION, SOLUTION INTRAMUSCULAR; INTRAVENOUS
Status: DISPENSED
Start: 2019-02-21

## (undated) RX ORDER — FENTANYL CITRATE 50 UG/ML
INJECTION, SOLUTION INTRAMUSCULAR; INTRAVENOUS
Status: DISPENSED
Start: 2020-11-24

## (undated) RX ORDER — ACYCLOVIR SODIUM 500 MG/10ML
INJECTION, SOLUTION INTRAVENOUS
Status: DISPENSED
Start: 2025-06-05

## (undated) RX ORDER — ONDANSETRON 2 MG/ML
INJECTION INTRAMUSCULAR; INTRAVENOUS
Status: DISPENSED
Start: 2019-02-21

## (undated) RX ORDER — LIDOCAINE HYDROCHLORIDE 20 MG/ML
INJECTION, SOLUTION EPIDURAL; INFILTRATION; INTRACAUDAL; PERINEURAL
Status: DISPENSED
Start: 2019-02-21

## (undated) RX ORDER — LIDOCAINE HYDROCHLORIDE 10 MG/ML
INJECTION, SOLUTION EPIDURAL; INFILTRATION; INTRACAUDAL; PERINEURAL
Status: DISPENSED
Start: 2019-02-21